# Patient Record
Sex: MALE | Employment: OTHER | URBAN - METROPOLITAN AREA
[De-identification: names, ages, dates, MRNs, and addresses within clinical notes are randomized per-mention and may not be internally consistent; named-entity substitution may affect disease eponyms.]

---

## 2021-03-23 ENCOUNTER — HOSPITAL ENCOUNTER (OUTPATIENT)
Dept: PHYSICAL THERAPY | Age: 66
Setting detail: THERAPIES SERIES
Discharge: HOME | End: 2021-03-23
Payer: MEDICARE

## 2021-03-23 ENCOUNTER — HOSPITAL ENCOUNTER (OUTPATIENT)
Dept: SPEECH THERAPY | Age: 66
Setting detail: THERAPIES SERIES
Discharge: HOME | End: 2021-03-23
Payer: MEDICARE

## 2021-03-23 DIAGNOSIS — I69.320 APHASIA AS LATE EFFECT OF CEREBROVASCULAR ACCIDENT (CVA): Primary | ICD-10-CM

## 2021-03-23 PROCEDURE — 97161 PT EVAL LOW COMPLEX 20 MIN: CPT

## 2021-03-23 PROCEDURE — 92523 SPEECH SOUND LANG COMPREHEN: CPT

## 2021-03-23 NOTE — PROGRESS NOTES
Dear Referring Practitioner: Dr. Michelle Ceja,     We had the pleasure of evaluating the following patient for physical therapy services at [facility]. A summary of our findings can be found in the initial assessment below. This includes our plan of care. If you have any questions or concerns regarding these findings, please do not hesitate to contact me at the office phone number. Thank you for the referral.         Physician Signature:_______________________________Date:__________________  By signing above (or electronic signature), therapists plan is approved by physician      Physical Therapy  Initial Assessment  Date: 3/23/2021  Patient Name: Salvador Bey  MRN: [id number]  : 1955    Subjective   General  Chart Reviewed: Yes  Patient assessed for rehabilitation services?: Yes  Additional Pertinent Hx: expressive aphasia, CVA, HTN  Referring Practitioner: Chiara Miller MD  Referral Date : 21  Diagnosis: CVA, R hemiparesis  General Comment  Comments: PLOF: , working at [employer] center, indep in ADLs  PT Visit Information  Onset Date: 19  PT Insurance Information: Medicare  Subjective  Subjective: Pt reports that he had a stroke ov 2019. Pt was then in the hospital and then transferred to [facility]. From [facility], he then went to [facility] in [place]. Was in rehab there for a while and then got stuck there because of COVID. Pt then returned to [facility] here in [place] in May. He has been receiving therapy there but that ended Dec 4th. Pt has been doing some exercises at home since home therapy left. Pt reports that he was a  and worked at [employer]. Pt would like to get back to walking better. Pt would also like to get stronger in his legs. Pt in indep in dressing and bathing. Pt does not have to do his own cleaning. Pt was driving but not since the stroke. Pt uses an AFO.   Pain Screening  Patient Currently in Pain: Denies  Vital Signs Individual Concurrent Group Co-treatment   Time In 0615         Time Out 1500         Minutes 45         Timed Code Treatment Minutes: [address] [place], [address] [facility] [address]

## 2021-03-23 NOTE — PROGRESS NOTES
[facility], [facility],  Miriam Chaney, [address]  Phone: [phone]  Fax [phone]         Speech Language Pathology  Facility/Department: [facility] SPEECH THERAPY  Initial Assessment    NAME: Jimmy Stuart  : 1955  MRN: [id number]    Date of Eval: 3/23/2021  Evaluating Therapist: Prashant Olivas    Visit Diagnoses       Codes    Aphasia as late effect of cerebrovascular accident (CVA)    -  Primary I69.320        Primary Complaint: Difficulty communicating due to his Aphasia. Onset Date: 19    Pain: Denies    Assessment/Impressions: The pt demonstrates moderate-severe expressive and receptive Aphasia. His verbal expression is non-fluent. The pt's spoken communication is limited to occasional words and phrase productions. Often the pt struggles to produce words becoming visibly frustrated as a result. Use of communication strategies such as fill-in the blank sentences and giving associated words was often successful at stimulating the attempted word production. His ability to repeat is largely intact with the pt able to repeat up to the single sentence level. His auditory processing is also significantly impaired. He could follow 1-step commands and answer basic yes/no question. However, he becomes quickly confused if there is more than 1 step or if the information becomes linguistically complex. Reading and writing reflect his Aphasia. He was only able to write his last name, unable to write any other single words. Reading is also a significant struggle. The pt has an iPad with and AAC panda loaded. However, per his sister-in-law he has not been using AAC much to communicate. The pt was assessed via the [address] ([place]-R) as well as informal measures. The pt received a bedside language score of 23.75 out of a possible 100. See a summary of results below.     Western Aphasia Battery Revised: Subtest: Score:   Spontaneous Speech: Content 0/10      Spontaneous Speech: Fluency 2/10      Auditory Verbal Comprehension: Y/N Questions 5/10   Sequential Commands 2/10      Repetition 7/10      Object Naming 2/10    Reading 0/10      Writing 1/10   Total= 19 out of a possible 80; Bedside language score= 23.75 out of a possible 100        Subjective:   Previous level of function and limitations: The pt was a former  of a [employer]. General  Chart Reviewed: Yes  Patient assessed for rehabilitation services?: Yes  Family / Caregiver Present: Yes  Visit Information  Onset Date: 11/23/19  SLP Insurance Information: Medicare A & B; AARP  Subjective  Subjective: The pt was accompanied by his sister-in-law Lyle Quiroz. The pt reportedly lives at [address] [facility]. He brought in his iPad that had several speech apps that the pt reportedly uses for hours each day. He is a former . Vital Signs  Patient Currently in Pain: Denies  Social/Functional History  Type of Home: Assisted living     Vision  Vision: Impaired  Vision Exceptions: Wears glasses for reading(reported right sided neglect)  Hearing  Hearing: Within functional limits      Objective:  Oral/Motor  Oral Motor: Within functional limits  Auditory Comprehension  Comprehension: Exceptions  Basic Questions: Mild  Complex Questions: Severe  One Step Basic Commands: Mild  Two Step Basic Commands: Severe  Multistep Basic Commands: Severe  Complex/Abstract Commands: Severe  Conversation: Moderate  Interfering Components: Processing speed  Effective Techniques: Extra processing time;Repetition; Slowed speech;Stressing words;Visual/Gestural cues  Reading Comprehension  Reading Status: Exceptions to [facility]  Words Impairment Severity: Severe  Phrases Impairment Severity: Severe  Sentence Impairment Severity: Severe  Expression  Primary Mode of Expression: Verbal  Verbal Expression  Verbal Expression: Exceptions to The pt's iPad speech/communication apps were also reviewed inculding [employer] panda. Patient Education Response: Needs reinforcement  D/C Recommendations: Outpatient  Requires SLP Intervention: Yes  Patient/family involved in developing goals and treatment plan: yes; the pt and his sister-in-law     Latex Allergy: [x]NO  []YES    Preferred Language for Healthcare: [x]English []Other:    [x]Patient history, allergies, meds reviewed. Medical chart reviewed. See intake form. Review of Symptoms (ROS):  [x]Performed Review of Symptoms (Integumentary, Cardiopulmonary, Neurological) by intake and observation. Intake form has been scanned into medical record. Patient has been instructed to contact their primary care physician regarding ROS issues if not already being addressed at this time.       C-SSRS Triggered by Intake questionnaire (Past 2 wk assessment):   [x] No, Questionnaire did not trigger screening.   [] Yes, Patient intake triggered further evaluation      [] C-SSRS Screening completed  [] PCP notified via Plan of Care  [] Emergency services notified      Luis Toribio Odessa Batty [id number]  Speech-Language Pathologist  Phone #: [phone]  Fax #: [phone]

## 2021-03-23 NOTE — PLAN OF CARE
[facility], [place] [address] [address]  [place], [address] [facility]  Phone: [phone]  Fax [phone]      Outpatient Speech Therapy  Phone: [phone] Fax: [phone]     To: Karen Juarez. Michelle Osborne MD      Patient: Myriam Cantrell  : 1955  MRN: [id number]  Evaluation Date: 3/23/2021      Diagnosis Information:  Visit Diagnoses        Codes     Aphasia as late effect of cerebrovascular accident (CVA)    -  Primary I69.320         Speech Therapy Certification Form    Plan of Care/Treatment to date:  [x] Speech-Language Evaluation/Treatment    [] Dysphagia Evaluation/Treatment        [] Dysphagia Treatment via Neuromuscular Electrical Stimulation (NMES)   [] Modified Barium Swallowing Study   [] Fiberoptic Endoscopic Evaluation of Swallowing (FEES)  [] Cognitive-Linguistic Skills Development  [] Voice evaluation and Treatment      [] Evaluation, modification, and Training of Voice Prosthetic     [] Evaluation for Speech-Generating Augmentative and Alternative Communication Device   [] Therapeutic Services for the use of Speech-Generating Device. [] Other:          Frequency/Duration:  # Days per week: [] 1 day # Weeks: [] 1 week [] 5 weeks      [x] 2 days   [] 2 weeks [x] 6 weeks     [] 3 days   [] 3 weeks [] 7 weeks     [] 4 days   [] 4 weeks [] 8 weeks    Rehab Potential: [] Excellent [x] Good [] Fair  [] Poor       Electronically signed by: CORWIN Davenport  Phone: [phone]  Fax: [phone]    If you have any questions or concerns, please don't hesitate to call.   Thank you for your referral.      Physician Signature:________________________________Date:__________________  By signing above, therapists plan is approved by physician

## 2021-03-23 NOTE — PROGRESS NOTES
NOMS - Spoken Language Expression      Patient: Bryan Marks  : 1955  MRN: [id number]  Date: 3/23/2021  Electronically Signed by: Gabriella Perez MA, CCC-SLP     Note: This FCM should not be used for individuals using an augmentative/alternative communication system. []  Level 1 The individual attempts to speak, but verbalizations are not meaningful to familiar or unfamiliar communication partners at any time    []  Level 2 The individual attempts to speak, although few attempts are accurate or appropriate. The communication partner must assume responsibility for structuring the communication exchange, and with consistent and maximal cueing, the individual can only occasionally produce automatic and/or imitative words and phrases that are rarely meaningful in context    [x]  Level 3  The communication partner must assume responsibility for structuring the communication exchange, and with consistent and moderate cueing, the individual can produce words and phrases that are appropriate and meaningful in context. []  Level 4 The individual is successfully able to initiate communication using spoken language in simple, structured conversations in routine daily activities with familiar communication partners. The individual usually requires moderate cueing, but is able to demonstrate use of simple sentences (i.e., semantics, syntax, and morphology) and rarely uses complex sentences/messages. []  Level 5 The individual is successfully able to initiate communication using spoken language in structured conversations with both familiar and unfamiliar communication partners. The individual occasionally requires minimal cueing to frame more complex sentences in messages.   The individual occasionally self-cues when encountering difficulty     []  Level 6 The individual is successfully able to communicate in most activities, but some limitations in spoken language are still apparent in vocational, avocational, and social activities . The individual rarely requires minimal cueing to frame complex sentences. The individual usually self-cues when encountering difficulty. []  Level 7 The individual's ability to independently participate in vocational, avocational, and social activities are not limited by spoken language skills . Independent functioning may occasionally include use of self-cueing.

## 2021-03-23 NOTE — FLOWSHEET NOTE
[facility]. and Therapy, [facility], [address]  Phone: [phone]  Fax [phone]    Physical Therapy Daily Treatment Note    Date:  3/23/2021    Patient Name:  Marli Bowers    :  1955  MRN: [id number]  Restrictions/Precautions:    Medical/Treatment Diagnosis Information:  · Diagnosis: CVA, R hemiparesis  Insurance/Certification information:  PT Insurance Information: Medicare  Physician Information:  Referring Practitioner: Erika Pa MD  Plan of care signed (Y/N):  N  Visit# / total visits:       G-Code (if applicable):          LEFS     Medicare Cap (if applicable):  85 = total amount used, updated 3/23/2021    Time in:   2:15    Timed Treatment: 0  Total Treatment Time:  45  ________________________________________________________________________________________    Pain Level:    /10  SUBJECTIVE:  See eval    OBJECTIVE:     Exercise/Equipment Resistance/Repetitions Other comments                                                                                                                                             Other Therapeutic Activities:      Manual Treatments:         Modalities:      Test/Measurements:         ASSESSMENT:    See eval     Treatment/Activity Tolerance:   []Patient tolerated treatment well [] Patient limited by fatique  []Patient limited by pain [] Patient limited by other medical complications  [] Other:     Goals:    Short term goals  Time Frame for Short term goals: 6 weeks  Short term goal 1: pt will be indep in HEP  Short term goal 2: pt will increase R DF strength by 1/3 muscle grade  Short term goal 3: pt will ambulate with increased glut activation RLE  Short term goal 4: pt will decrease TUG time 10 sec  Short term goal 5: pt will increase LEFS score by 10 points to hit MCID           Plan: [x] Continue per plan of care [] Alter current plan (see comments)   [x] Plan of care initiated [] Hold pending MD visit [] Discharge      Plan for Next Session:      Re-Certification Due Date:         Signature:  Aroldo Lindo PT

## 2021-03-25 ENCOUNTER — HOSPITAL ENCOUNTER (OUTPATIENT)
Dept: SPEECH THERAPY | Age: 66
Setting detail: THERAPIES SERIES
Discharge: HOME | End: 2021-03-25
Payer: MEDICARE

## 2021-03-25 ENCOUNTER — HOSPITAL ENCOUNTER (OUTPATIENT)
Dept: OCCUPATIONAL THERAPY | Age: 66
Setting detail: THERAPIES SERIES
Discharge: HOME | End: 2021-03-25
Payer: MEDICARE

## 2021-03-25 PROCEDURE — 97166 OT EVAL MOD COMPLEX 45 MIN: CPT

## 2021-03-25 PROCEDURE — 92507 TX SP LANG VOICE COMM INDIV: CPT

## 2021-03-25 PROCEDURE — 97140 MANUAL THERAPY 1/> REGIONS: CPT

## 2021-03-25 PROCEDURE — 97535 SELF CARE MNGMENT TRAINING: CPT

## 2021-03-25 NOTE — FLOWSHEET NOTE
[facility]. and Therapy, [facility], [address] [facility], [address] [place] [address]   Phone: [phone]  Fax [phone]      Outpatient Occupational Therapy     [x] Daily Treatment Note   [] Progress Note   [] Discharge Note    Date:  3/25/2021    Patient Name:  Stephan Beck         YOB: 1955    Medical Diagnosis/ICD10[de-identified]   CVA/I63.9, Right hemiparesis, Right side neglect, Expressive aphasia  Treatment Diagnosis: Impaired self care status due to decreased functional use of RUE/RLE  Referring Physician: Dr. Jillian Kuo    Referral Date:  3/15/2021  Onset Date:  Nov 2019  Visits Allowed/Insurance/Certification Information:   Medicare A/B, AARP  Restrictions/Precautions Right vic, aphasia    Plan of care sent to provider:    [x]Faxed (date: 3/25/2021  ) []Co-signature    (attempts: 1[] 2 []3[])        Plan of care signed:    []Yes (date:  )           [x]No       Progress Note covers period from (if applicable):    [x]NA    []From          To           Next Progress Note due:   4/22/2021    Visit# / total visits:  2/12    Functional Outcome Measure:   3/25/2021: QuickDASH: 42    Subjective: Pt arrived with sister-in-law, [facility]. Pt ambulating with [facility]. Pain level: denies    Plan for Next Session:  · Assess visual fields and tracking  · PROM in supine      Objective:       Exercises:    Exercises in bold performed in department today. Items not bolded are carried forward from prior visits for continuity of the record.   Exercise/Equipment Resistance/Repetitions HEP Other comments      ADL/IADL TRAINING         []        []        []        []       NEUROMUSCULAR RE-EDU and THERAPEUTIC ACTIVITY        Education on stroke recovery Role of OT  Tone and management of tone  Typical progression of UE function after stoke   [] Pt's sister-in-law verbalized understanding  Pt may benefit from reinforcement d/t communication deficits.        []        [] THERAPEUTIC EXERCISE and MANUAL TECHNIQUES        PROM RUE  Seated in chair []        []        []       MODALITIES         []        []       SPLINTING       RHS Pt reports pre-bettina resting hand splint at home as recommended by home OT. Pt reports difficult to don independently []      Home Exercise Program: TBD    Treatment/Activity Tolerance:    Patients response to treatment:   [x]Patient tolerated treatment well []Patient limited by fatigue   []Patient limited by pain  []Patient limited by other medical complications   []Other:     Assessment:   Pt is a 77year old male, referred to outpatient occupational therapy with diagnosis of CVA, right hemiparesis, right neglect and aphasia. Pt presents with impaired IADL status due to non-functional RUE. Pt will benefit from outpatient OT to maximize safety and independence with daily living tasks. Recommend outpatient OT 2x/week for 6weeks.         GOALS:  Patient goal: \" Get right arm working. Move back into home.  \"     Short Term Goals:  to be met in 3 weeks (by 4/15/2021)     Pt will demonstrate active assisted right shoulder flexion to 50 degrees or better, gravity reduced, while rating pain < 2/10 for improved ROM for functional task performance.     Pt will demonstrate 15o degrees of active assisted elbow flexion against gravity, while rating pain < 0/10 for improved ROM for functional task performance.     Pt and caregiver will independently verbalize 2 techniques to obtain optimal positioning of on right vic arm to ensure approximation of glenohumeral joint and decrease risk of complications from subluxation.      Pt will participate in cont assessment of visual perceptual status by completing visual tracking and visual field test. .     Pt will report consistent compliance with HEP at least 4x/wk        Long Term Goals:  to be met in 6 weeks (by 5/6/2021)     Pt will demonstrate active assisted right shoulder flexion to 50 degrees, against gravity, while rating pain < 2/10 for improved ROM for functional task performance.     Pt will demonstrate 25o degrees of right active elbow flexion,against gravity, while rating pain < 0/10 for improved ROM for functional task performance.     Pt will demonstrate 20o degrees of right active elbow extension while rating pain < 0/10 for improved ROM for functional task performance.     Pt will tolerate WBing for 5 min through RUE with elbow and wrist extension for tone management in prep for active assisted movement.          Prognosis: [x]Good   []Fair   []Poor    Patient Requires Follow-up:  [x]Yes  []No    Plan: [x]Plan of care initiated     [x]Continue per plan of care    [] Alter current plan (see comments)    []Hold pending MD visit []Discharge      ---  ---- --- ---- --- ---- --- ---- --- ---- --- ---- --- ---- --- ---- --- ---- --- --- ---    Therapeutic Exercise/Home Exercise Program:   0 minutes    Therapeutic Activity:  0 minutes    ADL Trainin minutes      Neuromuscular Re-Education:  0 minutes      Manual Therapy:  15 minutes    Splintin minutes     Modalities:  0 minutes    Time in:          1115    Time Out:   1210     Timed Code Treatment Minutes:   15 min eval + 40minute treatment =    55 minutes        Total Treatment Time:  40 minutes           Electronically signed by:  QI Mcneill/BETINA

## 2021-03-25 NOTE — FLOWSHEET NOTE
words with 50% accuracy, mod-max cues:  - Did not target    Assessment:   Noted significantly improved verbal expression output via use of NILSA today. Plan:   Continued Speech Therapy services 2 x week for 6 weeks. Total Treatment Time / Charges     Time in Time out Total Time / units   Cognitive Tx         Speech Tx 1007 1045 38 min / 1 unit   Dysphagia Tx           Signature:     Chaparro Acosta [place], [address] [facility]  [id number]  Speech-Language Pathologist  Phone #: [phone]  Fax #: [phone]

## 2021-03-25 NOTE — PROGRESS NOTES
[facility], [address]  Phone: [phone]  Fax [phone]    3/25/2021  To: Dr. Sean Woods    Patient: Bryan Marks  : 1955  MRN: [id number]  Evaluation Date: 3/25/2021      Diagnosis Information: CVA, Right hemiparesis, Right side neglect, Expressive aphasia    The following patient has been evaluated for occupational therapy services and for therapy to continue, insurance requires physician review of the treatment plan. Please review the attached evaluation and/or summary of the patient's plan of care, and verify that you agree therapy should continue by signing the attached document and sending it back to our office. Thank you for this referral.    PLAN OF CARE  To see patient for  2  x/week for 6  weeks for the following treatment interventions:     [x] Therapeutic Exercise  [x]  Modalities:  [x] Therapeutic Activity   [x] Electrical Stimulation  [x]  Neuromuscular Re-education  [x] Ultrasound    [x]  ADL/IADL training    [x] Kinesiotaping  [x]  Manual Therapy    [x] Coldpack/hotpack   [x]  Instruction in HEP    Other:  [x]  Splinting     []                []  LSVT     []              []  Wheelchair mobility training  []  Seating and positioning   []  Other: _______________    Thank you for the referral of this patient. Please sign the attached certification form.     Physician signature_______________________ Date________________    Fax to:   [phone]            OCCUPATIONAL THERAPY EVALUATION  Evaluation Date:  3/25/2021    Patient Name: Bryan Marks  YOB: 1955   Medical Diagnosis/ICD10[de-identified]   CVA/I63.9, Right hemiparesis, Right side neglect, Expressive aphasia  Treatment Diagnosis: Impaired self care status due to decreased functional use of RUE/RLE  Referring Physician: Dr. Sean Woods    Referral Date:  3/15/2021  Onset Date:  2019  Visits Allowed/Insurance/Certification Information:   Medicare A/B, AARP  Restrictions/Precautions Right vic, aphasia    SUBJECTIVE FINDINGS    Pain:  denies    PMHx:  HTN    History of Present Illness:    Pt reports that he had a stroke ov Nov 23, 2019. Pt was then in the hospital and then transferred to [facility]. From [facility], he then went to [facility] in [place]. Was in rehab there for a while and then got stuck there because of COVID. Pt then returned to [facility] here in [facility] in May. He has been receiving therapy there but that ended Dec 4th. Pt has been doing some exercises at home since home therapy left. Pt reports that he was a  and worked at [employer] Pt would like to get back to walking better. Pt would also like to get stronger in his legs. Pt in indep in dressing and bathing. Pt does not have to do his own cleaning. Pt was driving but not since the stroke. Home Environment - Lives with alone in assisted living apartment and [facility]  Family/caregiver support:    YES     Home environment:   apartment   Steps to enter first floor:    No steps   Steps to second floor: N/A  Bathroom:    Walk-in Shower, Grab bars and built-in shower chair  Bedroom:     Sleeps in regular bed  Equipment owned:   small base quad cane (SBQC), manual W/C and life alert      (Home on [place]: [address], 2 levels plus basement. Laundry in basement. Main floor bed and bath. Tub/shower.)     Current Functional Limitations:    BADLs  Bathing:   Independent   Dressing:   Independent   Toileting:   Independent    IADLs   Home tasks: Brother manages finances. Medication management, Cleaning, Laundry and cleaning services provided by [facility]. Work:  Working full time until [place] tasks:  Minimal since CVA and pandemic    Driving:    No driving since CVA    Prior Level of Functioning:      Pt fully independent with BADLs and IADLs and very active with work and leisure prior to November 2019.     Functional Outcome Measure:   3/25/2021: QuickDASH: 42    OBJECTIVE FINDINGS    Range of Motion    AROM         RIGHT   LEFT     Date 3/25/2021   3/25/2021    Shoulder:   flex x   WNL                       ABD ~75o   WNL    Elbow:      ext/flex x   WNL    Forearm:  sup/pron x   WNL    Wrist:       ext/flex x   WNL    Digits: x   WNL      Comments:   PROM of RUE WFL with increased time d/t flexor tone and shortened muscles/tendons. Demo active shrug and scap squeeze on right. Active right shld abduction with compensation at upper trap. No active movement noted in elbow/wrist/digits. No sublux noted in right GH joint. Pt's sister-in-law reports possible h/o GH sublux. Strength  Muscle  (*denotes pain) Right   Left  Comments       DATE 3/25/2021   3/25/2021     Shoulder Flexion  1   WNL     Shoulder Abduction  2+   WNL     Elbow Flexion 1?    WNL     Elbow Extension 0   WNL     Pronation 0   WNL     Supination 0   WNL     Wrist Flexion 0   WNL     Wrist Extension 0   WNL         Assessment of UE tone/spasticity:    Date: 3/35/2021      Shoulder flexors 2      Internal rotators 1      External rotators 0      Elbow flexors 1+  (clonus)      Elbow extensor 1+      Supinators 0      Pronators 1+      Wrist flexors 2      Wrist extensors 0      Digit flexors 2-3      Digit extensors 0          Modified Rian Scale  0    No increase in muscle tone  1    Slight increase in muscle tone, manifested by a catch and release or by minimal        resistance at the end of the range of motion when the affected part(s) is moved in        flexion or extension  1+ Slight increase in muscle tone, manifested by a catch, followed by minimal        resistance throughout the remainder (less than half) of the ROM  2    More marked increase in muscle tone through most of the ROM, but        affected part(s) easily moved  3    Considerable increase in muscle tone, passive movement difficult  4    Affected part(s) rigid in flexion or extension      Sensation:  Pt denies N/T in UE. Light touch intact. Proprioception:  Will cont to assess    Visual Assessment:   Wears glasses: for reading. Visual Tracking: requires further assessment   Visual fields: requires further assessment    Reports history of:  cataracts ; has seen neuro-opthomalogist (Dwain) and reports s/p procedure. Functional Cognition:   WFL for tasks completed. Follows one step commands appropriately. Difficult to fully assess d/t communication deficits. Demo good safety awareness. Hearing:  WFL    ASSESSMENT  Pt is a 77year old male, referred to outpatient occupational therapy with diagnosis of CVA, right hemiparesis, right neglect and aphasia. Pt presents with impaired IADL status due to non-functional RUE. Pt will benefit from outpatient OT to maximize safety and independence with daily living tasks. Recommend outpatient OT 2x/week for 6weeks. Problems:  [x] Impaired ADL/IADL status   [x] Decreased strength  [] Impaired functional transfer status  [x] Decreased endurance  [x] FMC deficits    [x] Impaired balance     [x] GMC deficits    [] Impaired functional cognition  [x] Visual impairments                    Decreased ROM   [] Impaired proprioception   [] Inadequate seating system   [] Decreased sensation   [] Other: ________________     Rehabilitation Potential:   [x]Good  []Fair    []Poor    Strengths for achieving goals include:   [x]Pt motivated   [x]PLOF   [x]Family support   Limitations for achieving goals include: []Pain  [x]Severity of condition   []Cognitive   []Lack of family support   []Lack of resources   []Other:           GOALS:  Patient goal: \" Get right arm working. Move back into home. \"    Short Term Goals:  to be met in 3 weeks (by 4/15/2021)    Pt will demonstrate active assisted right shoulder flexion to 50 degrees or better, gravity reduced, while rating pain < 2/10 for improved ROM for functional task performance.     Pt will demonstrate 15o degrees of active assisted elbow flexion against gravity, while rating pain < 0/10 for improved ROM for functional task performance. Pt and caregiver will independently verbalize 2 techniques to obtain optimal positioning of on right vic arm to ensure approximation of glenohumeral joint and decrease risk of complications from subluxation. Pt will participate in cont assessment of visual perceptual status by completing visual tracking and visual field test. .    Pt will report consistent compliance with HEP at least 4x/wk      Long Term Goals:  to be met in 6 weeks (by 5/6/2021)    Pt will demonstrate active assisted right shoulder flexion to 50 degrees, against gravity, while rating pain < 2/10 for improved ROM for functional task performance. Pt will demonstrate 25o degrees of right active elbow flexion,against gravity, while rating pain < 0/10 for improved ROM for functional task performance. Pt will demonstrate 20o degrees of right active elbow extension while rating pain < 0/10 for improved ROM for functional task performance. Pt will tolerate WBing for 5 min through RUE with elbow and wrist extension for tone management in prep for active assisted movement. Thank you for the referral of this patient. Please sign the attached certification form.     Time in: 1115 Time Out:   1210    Timed Code Treatment Minutes:   15 min eval + 40minute treatment =  55 minutes Total Treatment Time:  40 minutes      Electronically signed by: QI Walls/BETINA

## 2021-03-26 ENCOUNTER — HOSPITAL ENCOUNTER (OUTPATIENT)
Dept: PHYSICAL THERAPY | Age: 66
Setting detail: THERAPIES SERIES
Discharge: HOME | End: 2021-03-26
Payer: MEDICARE

## 2021-03-26 PROCEDURE — 97112 NEUROMUSCULAR REEDUCATION: CPT

## 2021-03-26 PROCEDURE — 97110 THERAPEUTIC EXERCISES: CPT

## 2021-03-26 NOTE — FLOWSHEET NOTE
[facility], [facility], [address] [facility], [address] [facility]  Phone: [phone]  Fax [phone]    Physical Therapy Daily Treatment Note    Date:  3/26/2021    Patient Name:  Gregory Lux    :  1955  MRN: [id number]  Restrictions/Precautions:    Medical/Treatment Diagnosis Information:  · Diagnosis: CVA, R hemiparesis  Insurance/Certification information:  PT Insurance Information: Medicare  Physician Information:  Referring Practitioner: Ashli Bui MD  Plan of care signed (Y/N):  N  Visit# / total visits:       G-Code (if applicable):          LEFS     Medicare Cap (if applicable):  506 = total amount used, updated 3/26/2021    Time in:   10:15    Timed Treatment: 45  Total Treatment Time:  45  ________________________________________________________________________________________    Pain Level:    /10  SUBJECTIVE:  Nothing new to report. No pain noted. OBJECTIVE: [id number]    Exercise/Equipment Resistance/Repetitions Other comments   NuStep 5 min           Supine leg press  Supine hip flexion 3 min  3 min Manual resistance   Clams #1 2x10 BLE    Bridge    Single limb x10  x10    PHE    Bent knee x10  x10 RLE    PKF 2x5 RLE    Supine clams x15 yellow   Supine hip neuro re-ed 3 min RLE    LAQ x10 RLE With TB assist   Seated hamstring curls x10 With maxislide   Sit-to-stand training nv                                TG 5 min                    Other Therapeutic Activities:      Manual Treatments:         Modalities:      Test/Measurements:         ASSESSMENT:    Pt tolerated session well. Early fatigue noted throughout. Minimal glut and hamstring activation noted - tactile cueing assisted in muscle contraction. Pt responded better with visual demonstration of exercise.     Treatment/Activity Tolerance:   [x]Patient tolerated treatment well [] Patient limited by fatique  []Patient limited by pain [] Patient limited by other medical complications  [] Other:     Goals:    Short term goals  Time Frame for Short term goals: 6 weeks  Short term goal 1: pt will be indep in HEP  Short term goal 2: pt will increase R DF strength by 1/3 muscle grade  Short term goal 3: pt will ambulate with increased glut activation RLE  Short term goal 4: pt will decrease TUG time 10 sec  Short term goal 5: pt will increase LEFS score by 10 points to hit MCID           Plan: [x] Continue per plan of care [] Alter current plan (see comments)   [] Plan of care initiated [] Hold pending MD visit [] Discharge      Plan for Next Session:      Re-Certification Due Date:         Signature:  Deanna Costa PT

## 2021-03-30 ENCOUNTER — HOSPITAL ENCOUNTER (OUTPATIENT)
Dept: SPEECH THERAPY | Age: 66
Setting detail: THERAPIES SERIES
Discharge: HOME | End: 2021-03-30
Payer: MEDICARE

## 2021-03-30 ENCOUNTER — HOSPITAL ENCOUNTER (OUTPATIENT)
Dept: PHYSICAL THERAPY | Age: 66
Setting detail: THERAPIES SERIES
Discharge: HOME | End: 2021-03-30
Payer: MEDICARE

## 2021-03-30 PROCEDURE — 92507 TX SP LANG VOICE COMM INDIV: CPT

## 2021-03-30 PROCEDURE — 97110 THERAPEUTIC EXERCISES: CPT

## 2021-03-30 PROCEDURE — 97112 NEUROMUSCULAR REEDUCATION: CPT

## 2021-03-30 NOTE — FLOWSHEET NOTE
[facility], [place] [address] [address]  [address] [facility], [address] [place] [address]   Phone: [phone]  Fax [phone]      Speech-Language Pathology  Daily Treatment Note    Date:  3/30/2021    Patient Name:  Ny Ralhp    :  1955  MRN: [id number]  Restrictions/Precautions:  Limited communication ability due to significant Aphasia  Treatment Diagnosis:    Codes     Aphasia as late effect of cerebrovascular accident (CVA)    -  Primary   Insurance/Certification information: Medicare A & B; [address]   Referring Physician:  Alissa Richard. Kyle Monique MD  Plan of care signed (Y/N):  Y  Visit# / total visits:  3/13   Pain level: No reported or suspected pain noted     Progress Note: []  Yes  [x]  No  Next due by: Visit #10: 3/10 or 21     Subjective: The pt demonstrated some improvement in verbal expression this date say more word and phrase productions with some reduction in the amount of hesitations. He still quickly become frustrated when attempting to verbally express himself but this frustration appeared to be less today. Objective:   Short-term Goals:  Goal 1: The pt will say indivdual phrase and short sentences, in response to therapy tasks, with 70% accuracy, mod-max cues:  - 70% (7/10), mod-max cues and use of modified NILSA  WILL D/C GOAL AS MET IF ABLE TO  MAINTAIN ACCURACY    Goal 2: The pt will complete basic confrontational and responsive naming with 75% accuracy, mod-max cues:  - Basic Confrontational: 60% (6/10), mod-max cues  - Basic Responsive: 70% (7/10), mod-max cues     Goal 3: The pt will use an AAC speech generating device to answer given basic questions with 80% accuracy, mod-max cues:  - 33%, mod-max cues and extra time; requiring the pt to find and point to a icon to answer a given question (e.g, \"What would you like to have for dinner? \")    Goal 4: The pt will write single basic words with 50% accuracy, mod-max cues:  - 0% (0/3), mod-max cues  - Spelling Magic Catrachito requiring him to arrange 3 letters to form basic words: 80% (4/5) with cues and extra time. Assessment:   Noted improved verbal expression today overall. Writing/spelling is a significant struggle. Plan:   Continued Speech Therapy services 2 x week for 6 weeks. Total Treatment Time / Charges     Time in Time out Total Time / units   Cognitive Tx         Speech Tx 1315 1415 60 min / 1 unit   Dysphagia Tx           Signature:     Sher Chou MA, [facility]  [id number]  Speech-Language Pathologist  Phone #: [phone]  Fax #: [phone]

## 2021-04-01 ENCOUNTER — HOSPITAL ENCOUNTER (OUTPATIENT)
Dept: PHYSICAL THERAPY | Age: 66
Setting detail: THERAPIES SERIES
Discharge: HOME | End: 2021-04-01
Payer: MEDICARE

## 2021-04-01 ENCOUNTER — HOSPITAL ENCOUNTER (OUTPATIENT)
Dept: SPEECH THERAPY | Age: 66
Setting detail: THERAPIES SERIES
Discharge: HOME | End: 2021-04-01
Payer: MEDICARE

## 2021-04-01 ENCOUNTER — HOSPITAL ENCOUNTER (OUTPATIENT)
Dept: OCCUPATIONAL THERAPY | Age: 66
Setting detail: THERAPIES SERIES
Discharge: HOME | End: 2021-04-01
Payer: MEDICARE

## 2021-04-01 PROCEDURE — 97110 THERAPEUTIC EXERCISES: CPT

## 2021-04-01 PROCEDURE — 97112 NEUROMUSCULAR REEDUCATION: CPT

## 2021-04-01 PROCEDURE — 92507 TX SP LANG VOICE COMM INDIV: CPT

## 2021-04-01 PROCEDURE — 97140 MANUAL THERAPY 1/> REGIONS: CPT

## 2021-04-01 NOTE — FLOWSHEET NOTE
[facility], [facility],  [address], [address] [place]   Phone: [phone]  Fax [phone]      Speech-Language Pathology  Daily Treatment Note    Date:  2021    Patient Name:  Martell Ackerman    :  1955  MRN: [id number]  Restrictions/Precautions:  Limited communication ability due to significant Aphasia  Treatment Diagnosis:    Codes     Aphasia as late effect of cerebrovascular accident (CVA)    -  Primary   Insurance/Certification information: Medicare A & B; [address]   Referring Physician:  Frank Matthews. Kb Verdugo MD  Plan of care signed (Y/N):  Y  Visit# / total visits:     Pain level: No reported or suspected pain noted     Progress Note: []  Yes  [x]  No  Next due by: Visit #10: 4/10 or 21     Subjective: The pt was accompanied by his sister-in-law. The pt was saying more words and phrases today in natural contexts. He was shown Sojeans and this was uploaded to his iPad as well as an catrachito for people with Aphasia to link music to verbal output. The pt still becomes quickly frustrated when difficulty speaking is encountered. Objective:   Short-term Goals:  Goal 1: The pt will say indivdual phrase and short sentences, in response to therapy tasks, with 70% accuracy, mod-max cues:  - 60% (6/10), mod-max cues and use of modified NILSA  WILL D/C GOAL AS MET IF ABLE TO  MAINTAIN ACCURACY    Goal 2: The pt will complete basic confrontational and responsive naming with 75% accuracy, mod-max cues:  - Goal targeted indirectly through other treatment tasks. Goal 3: The pt will use an AAC speech generating device to answer given basic questions with 80% accuracy, mod-max cues:  - - Goal targeted indirectly through other treatment tasks.       Goal 4: The pt will write single basic words with 50% accuracy, mod-max cues:  - Spelling Magic Catrachito requiring him to arrange 3 letters to form basic words: 80% (4/5) with cues and extra time. Assessment:   Noted improved verbal expression this date in natural communication. As a result, the session mostly focused on use of NILSA to promote spoken speech output. Plan:   Continued Speech Therapy services 2 x week for 6 weeks. Total Treatment Time / Charges     Time in Time out Total Time / units   Cognitive Tx         Speech Tx 1509 8089 60 min / 1 unit   Dysphagia Tx           Signature:     Starr Villalobos MA, [address] [id number]  Speech-Language Pathologist  Phone #: [phone]  Fax #: [phone]

## 2021-04-01 NOTE — FLOWSHEET NOTE
[facility], [facility], [address]  Phone: [phone]  Fax [phone]    Physical Therapy Daily Treatment Note    Date:  2021    Patient Name:  Emili Wheeler    :  1955  MRN: [id number]  Restrictions/Precautions:    Medical/Treatment Diagnosis Information:  · Diagnosis: CVA, R hemiparesis  Insurance/Certification information:  PT Insurance Information: Medicare  Physician Information:  Referring Practitioner: Kahlil Yo MD  Plan of care signed (Y/N):  N  Visit# / total visits:       G-Code (if applicable):          LEFS     Medicare Cap (if applicable):  086 = total amount used, updated 2021    Time in:   2:15    Timed Treatment: 45  Total Treatment Time:  45  ________________________________________________________________________________________    Pain Level:    /10  SUBJECTIVE:  Pt reports that he was worn out after the last session - slept a lot that night. OBJECTIVE: [id number]    Exercise/Equipment Resistance/Repetitions Other comments   NuStep 5 min           Supine leg press  Supine hip flexion 3 min  3 min Manual resistance   Clams #1 2x10 BLE    Bridge    Single limb x10  x10    PHE    Bent knee x10  x10 RLE In side-lying this session   PKF 2x5 RLE In sidelying this session   Supine clams x15 yellow   Supine hip neuro re-ed 3 min RLE    LAQ x10 RLE With TB assist   Seated hamstring curls x10 With maxislide   Sit-to-stand training nv                                TG 5 min                    Other Therapeutic Activities:      Manual Treatments:         Modalities:      Test/Measurements:         ASSESSMENT:    Pt tolerated session well. Early fatigue noted throughout. Minimal glut and hamstring activation noted - tactile cueing assisted in muscle contraction.  Pt with increased understanding of exercise, allowed for better focus on individual tasking of each exercise.     Treatment/Activity Tolerance:   [x]Patient tolerated treatment well [] Patient limited by fatique  []Patient limited by pain [] Patient limited by other medical complications  [] Other:     Goals:    Short term goals  Time Frame for Short term goals: 6 weeks  Short term goal 1: pt will be indep in HEP  Short term goal 2: pt will increase R DF strength by 1/3 muscle grade  Short term goal 3: pt will ambulate with increased glut activation RLE  Short term goal 4: pt will decrease TUG time 10 sec  Short term goal 5: pt will increase LEFS score by 10 points to hit MCID           Plan: [x] Continue per plan of care [] Alter current plan (see comments)   [] Plan of care initiated [] Hold pending MD visit [] Discharge      Plan for Next Session:      Re-Certification Due Date:         Signature:  Antoino Lockett , PT

## 2021-04-01 NOTE — FLOWSHEET NOTE
[facility], [facility], [address] [place] [address]   Phone: [phone]  Fax [phone]      Outpatient Occupational Therapy     [x] Daily Treatment Note   [] Progress Note   [] Discharge Note    Date:  4/1/2021    Patient Name:  Emili Wheeler         YOB: 1955    Medical Diagnosis/ICD10[de-identified]   CVA/I63.9, Right hemiparesis, Right side neglect, Expressive aphasia  Treatment Diagnosis: Impaired self care status due to decreased functional use of RUE/RLE  Referring Physician: Dr. Arturo Rodriguez    Referral Date:  3/15/2021  Onset Date:  Nov 2019  Visits Allowed/Insurance/Certification Information:   Medicare A/B, AARP  Restrictions/Precautions Right vic, aphasia    Plan of care sent to provider:    [x]Faxed (date: 3/25/2021  ) []Co-signature    (attempts: 1[] 2 []3[])        Plan of care signed:    []Yes (date:  )           [x]No       Progress Note covers period from (if applicable):    [x]NA    []From          To           Next Progress Note due:   4/22/2021    Visit# / total visits:  2/12    Functional Outcome Measure:   3/25/2021: QuickDASH: 42    Subjective: Pt arrived with sister-in-law, Sherry Canchola. Pt ambulating with SBQC. Pain level: denies    Plan for Next Session:  · Assess visual fields and tracking  · PROM in supine      Objective:       Exercises:    Exercises in bold performed in department today. Items not bolded are carried forward from prior visits for continuity of the record.   Exercise/Equipment Resistance/Repetitions HEP Other comments      ADL/IADL TRAINING         []        []        []        []       NEUROMUSCULAR RE-EDU and THERAPEUTIC ACTIVITY        Education on stroke recovery Role of OT  Tone and management of tone  Typical progression of UE function after stoke   [] Pt's sister-in-law verbalized understanding  Pt may benefit from reinforcement d/t communication deficits.        []        [] THERAPEUTIC EXERCISE and MANUAL TECHNIQUES        PROM RUE  - Supine    Shld flexion to ~90o  Shld abduction to ~90o  ER to ~10-15o  Elbow ext  Wrist ext to neutral with increased effort  Digit ext with increased effort   [] Pt's sister in law trained on PROM techniques for shld flex, shld abd and elbow ext. She returned demo and verbalized understanding. Emailed program via [employer].        []        []       MODALITIES         []        []       SPLINTING       RHS Pt reports pre-bettina resting hand splint at home as recommended by home OT. Pt reports difficult to don independently []      Home Exercise Program: PROM by caregiver    Treatment/Activity Tolerance:    Patients response to treatment:   [x]Patient tolerated treatment well []Patient limited by fatigue   []Patient limited by pain  []Patient limited by other medical complications   []Other:     Assessment:   Cont to assess RUE PROM in supine. Pt's sister in law trained on PROM techniques for shld flex, shld abd and elbow ext. She returned demo and verbalized understanding. Emailed program via [employer]. Cont per OT poc.     GOALS:  Patient goal: \" Get right arm working. Move back into home.  \"     Short Term Goals:  to be met in 3 weeks (by 4/15/2021)     Pt will demonstrate active assisted right shoulder flexion to 50 degrees or better, gravity reduced, while rating pain < 2/10 for improved ROM for functional task performance.     Pt will demonstrate 15o degrees of active assisted elbow flexion against gravity, while rating pain < 0/10 for improved ROM for functional task performance.     Pt and caregiver will independently verbalize 2 techniques to obtain optimal positioning of on right vic arm to ensure approximation of glenohumeral joint and decrease risk of complications from subluxation.      Pt will participate in cont assessment of visual perceptual status by completing visual tracking and visual field test. .     Pt will report consistent compliance with HEP at least 4x/wk        Long Term Goals:  to be met in 6 weeks (by 2021)     Pt will demonstrate active assisted right shoulder flexion to 50 degrees, against gravity, while rating pain < 2/10 for improved ROM for functional task performance.     Pt will demonstrate 25o degrees of right active elbow flexion,against gravity, while rating pain < 0/10 for improved ROM for functional task performance.     Pt will demonstrate 20o degrees of right active elbow extension while rating pain < 0/10 for improved ROM for functional task performance.     Pt will tolerate WBing for 5 min through RUE with elbow and wrist extension for tone management in prep for active assisted movement.          Prognosis: [x]Good   []Fair   []Poor    Patient Requires Follow-up:  [x]Yes  []No    Plan: [x]Plan of care initiated     [x]Continue per plan of care    [] Alter current plan (see comments)    []Hold pending MD visit []Discharge      ---  ---- --- ---- --- ---- --- ---- --- ---- --- ---- --- ---- --- ---- --- ---- --- --- ---    Therapeutic Exercise/Home Exercise Program:   15 minutes    Therapeutic Activity:  0 minutes    ADL Training:  minutes      Neuromuscular Re-Education:  15 minutes      Manual Therapy:  15 minutes    Splintin minutes     Modalities:  0 minutes    Time in:          1330    Time Out:   1415     Timed Code Treatment Minutes:   45 minutes        Total Treatment Time:  45 minutes           Electronically signed by:  QI Barragan/BETINA

## 2021-04-06 ENCOUNTER — HOSPITAL ENCOUNTER (OUTPATIENT)
Dept: PHYSICAL THERAPY | Age: 66
Setting detail: THERAPIES SERIES
Discharge: HOME | End: 2021-04-06
Payer: MEDICARE

## 2021-04-06 ENCOUNTER — HOSPITAL ENCOUNTER (OUTPATIENT)
Dept: SPEECH THERAPY | Age: 66
Setting detail: THERAPIES SERIES
Discharge: HOME | End: 2021-04-06
Payer: MEDICARE

## 2021-04-06 ENCOUNTER — HOSPITAL ENCOUNTER (OUTPATIENT)
Dept: OCCUPATIONAL THERAPY | Age: 66
Setting detail: THERAPIES SERIES
Discharge: HOME | End: 2021-04-06
Payer: MEDICARE

## 2021-04-06 PROCEDURE — 97112 NEUROMUSCULAR REEDUCATION: CPT

## 2021-04-06 PROCEDURE — 92507 TX SP LANG VOICE COMM INDIV: CPT

## 2021-04-06 PROCEDURE — 97535 SELF CARE MNGMENT TRAINING: CPT

## 2021-04-06 PROCEDURE — 97110 THERAPEUTIC EXERCISES: CPT

## 2021-04-06 NOTE — FLOWSHEET NOTE
[facility], [facility],  [address], [address]   Phone: [phone]  Fax [phone]      Speech-Language Pathology  Daily Treatment Note    Date:  2021    Patient Name:  Car Patel    :  1955  MRN: [id number]  Restrictions/Precautions:  Limited communication ability due to significant Aphasia  Treatment Diagnosis:    Codes     Aphasia as late effect of cerebrovascular accident (CVA)    -  Primary   Insurance/Certification information: Medicare A & B; [address]   Referring Physician:  Angela Salomon. Oumar Garcia MD  Plan of care signed (Y/N):  Y  Visit# / total visits:     Pain level: No reported or suspected pain noted     Progress Note: []  Yes  [x]  No  Next due by: Visit #10: 5/10 or 21     Subjective: The pt came to the session alone. He still demonstrates significant frustration when speech difficulty is encountered. Despite this frustration the pt demonstrates significant improvements in therapy    Objective:   Short-term Goals:  Goal 1: The pt will say indivdual phrase and short sentences, in response to therapy tasks, with 70% accuracy, mod-max cues:  - 60% (6/10), mod-max cues and use of modified NILSA; targeted via picture descriptions    Goal 2: The pt will complete basic confrontational and responsive naming with 75% accuracy, mod-max cues:  - Confrontational: 100% (12/12)  - Responsive: 100% (12/)    WILL D/C GOAL AS MET IF ABLE TO MAINTAIN ACCURACY    Goal 3: The pt will use an AAC speech generating device to answer given basic questions with 80% accuracy, mod-max cues:  - 0% (0/4), more difficultly finding icons this date.     Goal 4: The pt will write single basic words with 50% accuracy, mod-max cues:  - Spelling Magic Catrachito requiring him spell 3-4 letter words by using a movable alphabet in which he is able to touch all letters an hear the sound the letters make and what letter is needed in the word: 0% (0/5) with cues and extra time. The pt struggled with this task, not even touching the letters; He appeared overwhelmed by the task    Assessment:   The pt's verbal expression in natural settings is improving with more word and phrase productions. Plan:   Continued Speech Therapy services 2 x week for 6 weeks. Total Treatment Time / Charges     Time in Time out Total Time / units   Cognitive Tx         Speech Tx 1030 1115 45 min / 1 unit   Dysphagia Tx           Signature:     Pratik Dobbs MA, Pauline Fritz  GQ#[id number]  Speech-Language Pathologist  Phone #: [phone]  Fax #: [phone]

## 2021-04-06 NOTE — FLOWSHEET NOTE
[facility], [facility], [address] [place] [address], [address] [place] [address]   Phone: [phone]  Fax [phone]      Outpatient Occupational Therapy     [x] Daily Treatment Note   [] Progress Note   [] Discharge Note    Date:  4/6/2021    Patient Name:  Silvia Denny         YOB: 1955    Medical Diagnosis/ICD10[de-identified]   CVA/I63.9, Right hemiparesis, Right side neglect, Expressive aphasia  Treatment Diagnosis: Impaired self care status due to decreased functional use of RUE/RLE  Referring Physician: Dr. Arce Cea    Referral Date:  3/15/2021  Onset Date:  Nov 2019  Visits Allowed/Insurance/Certification Information:   Medicare A/B, AARP  Restrictions/Precautions Right vic, aphasia    Plan of care sent to provider:    [x]Faxed (date: 4/6/2021  ) []Co-signature    (attempts: 1[] 2 []3[])        Plan of care signed:    []Yes (date:  )           [x]No       Progress Note covers period from (if applicable):    [x]NA    []From          To           Next Progress Note due:   4/22/2021    Visit# / total visits:  3/12    Functional Outcome Measure:   3/25/2021: QuickDASH: 42    Subjective: Pt arrived alone. Pt ambulating with SBQC. Pain level: denies    Plan for Next Session:  WBing  Arm skate/table top towel slides  PROM in supine    Self ROM    Objective:       Exercises:    Exercises in bold performed in department today. Items not bolded are carried forward from prior visits for continuity of the record.   Exercise/Equipment Resistance/Repetitions HEP Other comments      ADL/IADL TRAINING       LB dressing Doff/don right shoe and AFO with modified independence as pt required increased time   []        []        []        []       NEUROMUSCULAR RE-EDU and THERAPEUTIC ACTIVITY        Education on stroke recovery Role of OT  Tone and management of tone  Typical progression of UE function after stoke   [] Pt's sister-in-law verbalized understanding   Pt will benefit from reinforcement d/t communication deficits. WBing RUE, fully ext  EOM  7 min [] Denies pain with WBing       []         THERAPEUTIC EXERCISE and MANUAL TECHNIQUES     AAROM  Attempted active assisted shld flexion    Pt demo shld abduction, elbow/wrist flex with attempts at active movement. PROM RUE  - Supine    Shld flexion to ~90o  Shld abduction to ~90o  ER to ~10-15o  Elbow ext  Wrist ext to neutral with increased effort  Digit ext with increased effort   [] Pt's sister in law trained on PROM techniques for shld flex, shld abd and elbow ext. She returned demo and verbalized understanding. Emailed program via [address] [place] [address]. Self ROM shld flexion while supine  Instructed to avoid flex >90  Pt returned demo [] Added to HEP       []       MODALITIES         []        []       SPLINTING       RHS Pt reports pre-bettina resting hand splint at home as recommended by home OT. Pt reports difficult to don independently []      Home Exercise Program: PROM by caregiver    Treatment/Activity Tolerance:    Patients response to treatment:   [x]Patient tolerated treatment well []Patient limited by fatigue   []Patient limited by pain  []Patient limited by other medical complications   []Other:     Assessment:   Pt instructed on self ROM for supine shld flex and returned demo. Tolerating WBing on RUE through wrist.  Cont per OT poc.     GOALS:  Patient goal: \" Get right arm working. Move back into home.  \"     Short Term Goals:  to be met in 3 weeks (by 4/15/2021)     Pt will demonstrate active assisted right shoulder flexion to 50 degrees or better, gravity reduced, while rating pain < 2/10 for improved ROM for functional task performance.     Pt will demonstrate 15o degrees of active assisted elbow flexion against gravity, while rating pain < 0/10 for improved ROM for functional task performance.     Pt and caregiver will independently verbalize 2 techniques to obtain optimal positioning of on right vic arm to ensure approximation of glenohumeral joint and decrease risk of complications from subluxation.      Pt will participate in cont assessment of visual perceptual status by completing visual tracking and visual field test. . - GOAL MET, 21, no apparent visual deficits     Pt will report consistent compliance with HEP at least 4x/wk        Long Term Goals:  to be met in 6 weeks (by 2021)     Pt will demonstrate active assisted right shoulder flexion to 50 degrees, against gravity, while rating pain < 2/10 for improved ROM for functional task performance.     Pt will demonstrate 25o degrees of right active elbow flexion,against gravity, while rating pain < 0/10 for improved ROM for functional task performance.     Pt will demonstrate 20o degrees of right active elbow extension while rating pain < 0/10 for improved ROM for functional task performance.     Pt will tolerate WBing for 5 min through RUE with elbow and wrist extension for tone management in prep for active assisted movement.          Prognosis: [x]Good   []Fair   []Poor    Patient Requires Follow-up:  [x]Yes  []No    Plan: []Plan of care initiated     [x]Continue per plan of care    [] Alter current plan (see comments)    []Hold pending MD visit []Discharge      ---  ---- --- ---- --- ---- --- ---- --- ---- --- ---- --- ---- --- ---- --- ---- --- --- ---    Therapeutic Exercise/Home Exercise Program:    minutes    Therapeutic Activity:  0 minutes    ADL Training: 15 minutes      Neuromuscular Re-Education:  30 minutes      Manual Therapy:   minutes    Splintin minutes     Modalities:  0 minutes    Time in:          1115    Time Out:   1200     Timed Code Treatment Minutes:   45 minutes        Total Treatment Time:  45 minutes           Electronically signed by:  QI Martin/BETINA

## 2021-04-06 NOTE — FLOWSHEET NOTE
[facility], [facility], [address] [place] [address]  Phone: [phone]  Fax [phone]    Physical Therapy Daily Treatment Note    Date:  2021    Patient Name:  Marshall Jones    :  1955  MRN: [id number]  Restrictions/Precautions:    Medical/Treatment Diagnosis Information:  · Diagnosis: CVA, R hemiparesis  Insurance/Certification information:  PT Insurance Information: Medicare  Physician Information:  Referring Practitioner: Ashu Walker MD  Plan of care signed (Y/N):  N  Visit# / total visits:       G-Code (if applicable):          LEFS     Medicare Cap (if applicable):  586 = total amount used, updated 2021    Time in:   9:45    Timed Treatment: 45  Total Treatment Time:  45  ________________________________________________________________________________________    Pain Level:    /10  SUBJECTIVE: Nothing new to report. OBJECTIVE: [id number]    Exercise/Equipment Resistance/Repetitions Other comments   NuStep 5 min           Supine leg press  Supine hip flexion 3 min  3 min Manual resistance   Clams #1 2x10 BLE    Bridge    Single limb x10  x10    PHE    Bent knee x10  x10 RLE In side-lying this session   PKF 2x5 RLE In sidelying this session   Supine clams x15 yellow   Supine hip neuro re-ed 3 min RLE    LAQ x10 RLE With TB assist   Seated hamstring curls x10 With maxislide   Sit-to-stand training nv                                TG 5 min                    Other Therapeutic Activities:      Manual Treatments:         Modalities:      Test/Measurements:         ASSESSMENT:    Pt tolerated session well. Early fatigue noted throughout. Minimal glut and hamstring activation noted - tactile cueing assisted in muscle contraction. Pt with increased understanding of exercise, allowed for better focus on individual tasking of each exercise.     Treatment/Activity Tolerance:   [x]Patient tolerated treatment well [] Patient limited by fatique  []Patient limited by pain [] Patient limited by other medical complications  [] Other:     Goals:    Short term goals  Time Frame for Short term goals: 6 weeks  Short term goal 1: pt will be indep in HEP  Short term goal 2: pt will increase R DF strength by 1/3 muscle grade  Short term goal 3: pt will ambulate with increased glut activation RLE  Short term goal 4: pt will decrease TUG time 10 sec  Short term goal 5: pt will increase LEFS score by 10 points to hit MCID           Plan: [x] Continue per plan of care [] Alter current plan (see comments)   [] Plan of care initiated [] Hold pending MD visit [] Discharge      Plan for Next Session:      Re-Certification Due Date:         Signature:  Lynsey Hewitt , PT

## 2021-04-08 ENCOUNTER — HOSPITAL ENCOUNTER (OUTPATIENT)
Dept: PHYSICAL THERAPY | Age: 66
Setting detail: THERAPIES SERIES
Discharge: HOME | End: 2021-04-08
Payer: MEDICARE

## 2021-04-08 ENCOUNTER — HOSPITAL ENCOUNTER (OUTPATIENT)
Dept: OCCUPATIONAL THERAPY | Age: 66
Setting detail: THERAPIES SERIES
Discharge: HOME | End: 2021-04-08
Payer: MEDICARE

## 2021-04-08 ENCOUNTER — HOSPITAL ENCOUNTER (OUTPATIENT)
Dept: SPEECH THERAPY | Age: 66
Setting detail: THERAPIES SERIES
Discharge: HOME | End: 2021-04-08
Payer: MEDICARE

## 2021-04-08 PROCEDURE — 97112 NEUROMUSCULAR REEDUCATION: CPT

## 2021-04-08 PROCEDURE — 97140 MANUAL THERAPY 1/> REGIONS: CPT

## 2021-04-08 PROCEDURE — 92507 TX SP LANG VOICE COMM INDIV: CPT

## 2021-04-08 PROCEDURE — 97110 THERAPEUTIC EXERCISES: CPT

## 2021-04-08 NOTE — FLOWSHEET NOTE
[facility], [facility],  Miriam Brunner, [address]   Phone: [phone]  Fax [phone]      Speech-Language Pathology  Daily Treatment Note    Date:  2021    Patient Name:  Mis Erickson    :  1955  MRN: [id number]  Restrictions/Precautions:  Limited communication ability due to significant Aphasia  Treatment Diagnosis:    Codes     Aphasia as late effect of cerebrovascular accident (CVA)    -  Primary   Insurance/Certification information: Medicare A & B; [address]   Referring Physician:  Whitley Pantoja. Sue Cabral MD  Plan of care signed (Y/N):  Y  Visit# / total visits:     Pain level: No reported or suspected pain noted     Progress Note: []  Yes  [x]  No  Next due by: Visit #10: 6/10 or 21     Subjective: The pt came to the session alone. He was in good spirits but still demonstrates significant frustration when difficulty is encountered. This was again addressed with the pt with the need to attempt to stay relaxed even when difficulty is encountered stressed to him. Objective:   Short-term Goals:  Goal 1: The pt will say indivdual phrase and short sentences, in response to therapy tasks, with 70% accuracy, mod-max cues:  - 80% (8/10), mod-max cues and use of modified NILSA; targeted via giving open ended responses to role-playing questions. WILL D/C GOAL AS MET IF ABLE TO MAINTAIN ACCURACY    Goal 2: The pt will complete basic confrontational and responsive naming with 75% accuracy, mod-max cues:  - Confrontational: 100% (10/10)  - Responsive: 90% (9/10)    GOAL MET    Goal 3: The pt will use an AAC speech generating device to answer given basic questions with 80% accuracy, mod-max cues:  - Goal targeted indirectly through other treatment tasks. Goal 4: The pt will write single basic words with 50% accuracy, mod-max cues:  - Spelling basic words given spaced for the numbers of letters: 0% (0/4);  Unable to complete

## 2021-04-08 NOTE — FLOWSHEET NOTE
[facility], [facility], [address], [address]  Phone: [phone]  Fax [phone]    Physical Therapy Daily Treatment Note    Date:  2021    Patient Name:  Marshall Jones    :  1955  MRN: [id number]  Restrictions/Precautions:    Medical/Treatment Diagnosis Information:  · Diagnosis: CVA, R hemiparesis  Insurance/Certification information:  PT Insurance Information: Medicare  Physician Information:  Referring Practitioner: Ashu Walker MD  Plan of care signed (Y/N):  N  Visit# / total visits:       G-Code (if applicable):          LEFS     Medicare Cap (if applicable):  033 = total amount used, updated 2021    Time in:   9:45    Timed Treatment: 45  Total Treatment Time:  45  ________________________________________________________________________________________    Pain Level:    /10  SUBJECTIVE: Nothing new to report. OBJECTIVE: [id number]    Exercise/Equipment Resistance/Repetitions Other comments   NuStep 5 min           Supine leg press  Supine hip flexion 3 min  3 min Manual resistance   Clams #1 2x10 BLE    Bridge    Single limb x10  x10    PHE    Bent knee x10  x10 RLE In side-lying this session   PKF 2x5 RLE In sidelying this session   Supine clams x15 yellow   Supine hip neuro re-ed 3 min RLE    LAQ x10 RLE With TB assist   Seated hamstring curls x10 With maxislide   Sit-to-stand training nv    Side-lying knee extension/flexion x10 RLE           Slant board 3x30\"           TG 5 min                    Other Therapeutic Activities:      Manual Treatments:         Modalities:      Test/Measurements:         ASSESSMENT:    Pt tolerated session well. Early fatigue noted throughout. Minimal glut and hamstring activation noted - tactile cueing assisted in muscle contraction. Pt with increased understanding of exercise, allowed for better focus on individual tasking of each exercise.     Treatment/Activity

## 2021-04-08 NOTE — FLOWSHEET NOTE
pain  []Patient limited by other medical complications   []Other:     Assessment:   Pt tolerating NMR and facilitation of normal movement patterns using techniques to reduce gravity and isolate intended muscle groups. Cont per OT poc.     GOALS:  Patient goal: \" Get right arm working. Move back into home.  \"     Short Term Goals:  to be met in 3 weeks (by 4/15/2021)     Pt will demonstrate active assisted right shoulder flexion to 50 degrees or better, gravity reduced, while rating pain < 2/10 for improved ROM for functional task performance.     Pt will demonstrate 15o degrees of active assisted elbow flexion against gravity, while rating pain < 0/10 for improved ROM for functional task performance.     Pt and caregiver will independently verbalize 2 techniques to obtain optimal positioning of on right vic arm to ensure approximation of glenohumeral joint and decrease risk of complications from subluxation.      Pt will participate in cont assessment of visual perceptual status by completing visual tracking and visual field test. . - GOAL MET, 4/6/21, no apparent visual deficits     Pt will report consistent compliance with HEP at least 4x/wk        Long Term Goals:  to be met in 6 weeks (by 5/6/2021)     Pt will demonstrate active assisted right shoulder flexion to 50 degrees, against gravity, while rating pain < 2/10 for improved ROM for functional task performance.     Pt will demonstrate 25o degrees of right active elbow flexion,against gravity, while rating pain < 0/10 for improved ROM for functional task performance.     Pt will demonstrate 20o degrees of right active elbow extension while rating pain < 0/10 for improved ROM for functional task performance.     Pt will tolerate WBing for 5 min through RUE with elbow and wrist extension for tone management in prep for active assisted movement.          Prognosis: [x]Good   []Fair   []Poor    Patient Requires Follow-up:  [x]Yes  []No    Plan: []Plan of care initiated     [x]Continue per plan of care    [] Alter current plan (see comments)    []Hold pending MD visit []Discharge      ---  ---- --- ---- --- ---- --- ---- --- ---- --- ---- --- ---- --- ---- --- ---- --- --- ---    Therapeutic Exercise/Home Exercise Program:    minutes    Therapeutic Activity:  0 minutes    ADL Training:  minutes      Neuromuscular Re-Education:  30 minutes      Manual Therapy: 15  minutes    Splintin minutes     Modalities:  0 minutes    Time in:          1115    Time Out:   1200     Timed Code Treatment Minutes:   45 minutes        Total Treatment Time:  45 minutes           Electronically signed by:  QI Kiran/BETINA

## 2021-04-13 ENCOUNTER — HOSPITAL ENCOUNTER (OUTPATIENT)
Dept: PHYSICAL THERAPY | Age: 66
Setting detail: THERAPIES SERIES
Discharge: HOME | End: 2021-04-13
Payer: MEDICARE

## 2021-04-13 ENCOUNTER — HOSPITAL ENCOUNTER (OUTPATIENT)
Dept: SPEECH THERAPY | Age: 66
Setting detail: THERAPIES SERIES
Discharge: HOME | End: 2021-04-13
Payer: MEDICARE

## 2021-04-13 ENCOUNTER — HOSPITAL ENCOUNTER (OUTPATIENT)
Dept: OCCUPATIONAL THERAPY | Age: 66
Setting detail: THERAPIES SERIES
Discharge: HOME | End: 2021-04-13
Payer: MEDICARE

## 2021-04-13 PROCEDURE — 97535 SELF CARE MNGMENT TRAINING: CPT

## 2021-04-13 PROCEDURE — 92507 TX SP LANG VOICE COMM INDIV: CPT

## 2021-04-13 PROCEDURE — 97112 NEUROMUSCULAR REEDUCATION: CPT

## 2021-04-13 PROCEDURE — 97110 THERAPEUTIC EXERCISES: CPT

## 2021-04-13 NOTE — FLOWSHEET NOTE
[facility], [facility] [address] [place] [address]   Phone: [phone]  Fax [phone]      Outpatient Occupational Therapy     [x] Daily Treatment Note   [] Progress Note   [] Discharge Note    Date:  4/13/2021    Patient Name:  Car Patel         YOB: 1955    Medical Diagnosis/ICD10[de-identified]   CVA/I63.9, Right hemiparesis, Right side neglect, Expressive aphasia  Treatment Diagnosis: Impaired self care status due to decreased functional use of RUE/RLE  Referring Physician: Dr. Ny Benavidez    Referral Date:  3/15/2021  Onset Date:  Nov 2019  Visits Allowed/Insurance/Certification Information:   Medicare A/B, AARP  Restrictions/Precautions Right vic, aphasia    Plan of care sent to provider:    [x]Faxed (date: 4/6/2021  ) []Co-signature    (attempts: 1[x] 2 []3[])        Plan of care signed:    [x]Yes (date:4/7/21  )           []No       Progress Note covers period from (if applicable):    [x]NA    []From 3/25/21         To           Next Progress Note due:   4/22/2021    Visit# / total visits:  5/12    Functional Outcome Measure:   3/25/2021: QuickDASH: 42    Subjective: Pt arrived alone. Pt ambulating with SBQC. Pain level: denies    Plan for Next Session:  WBing  Arm skate/table top towel slides  PROM in supine    Self ROM    Objective:       Exercises:    Exercises in bold performed in department today. Items not bolded are carried forward from prior visits for continuity of the record.   Exercise/Equipment Resistance/Repetitions HEP Other comments      ADL/IADL TRAINING       LB dressing Doff/don right shoe and AFO with modified independence as pt required increased time    [] Increased effort this date d/t new shoes       []        []        []       NEUROMUSCULAR RE-EDU and THERAPEUTIC ACTIVITY        Education on stroke recovery Role of OT  Tone and management of tone  Typical progression of UE function after zafar   [] Pt's sister-in-law verbalized understanding   Pt will benefit from reinforcement d/t communication deficits. WBing RUE, fully ext  EOM  8 min    Weight shifting toward right for increased proprioceptive input [] Denies pain with WBing   Right shoulder movement Use of inflatable arm splint to isolate shoulder muscles for facilitating normal movement patterns and reduce compensation    EOM - use of table top at shld ht  Active:Horizontal adduction  Active assisted: Horizontal abduction    Supine - Active assisted:   shld flex , 10x  shld ext, 10x  Elbow flex, 10x  Elbow ext, 5x           Noted pt initiating movement, even against gravity. Increased effort required - cues for breathing    Demo flex synergy, clemente with attempts at active elbow ext   Card sorting Unable to follow commands for sorting by suit. Able to sort by color with min cues/explaination  Sorting entire deck with only 1 error  Completed while WBing to facilitate weight shift to right           Arm skate Seated EOM, using rolling/hieght adjustable table    Pushing cones of edge of table (16x)    Demo active movement for: (flexor synergy)  Horizontal adduction, elbow flex and shld ext    Active assist for:  Horizontal abduction, elbow ext, shld flex [] Increased effort required - cues for breathing        THERAPEUTIC EXERCISE and MANUAL TECHNIQUES     AAROM  Attempted active assisted shld flexion    Pt demo shld abduction, elbow/wrist flex with attempts at active movement. PROM RUE  - Supine    Shld flexion to ~90o  Shld abduction to ~90o  ER to ~10-15o  Elbow ext  Wrist ext to neutral with increased effort  Digit ext with increased effort   [] Pt's sister in law trained on PROM techniques for shld flex, shld abd and elbow ext. She returned demo and verbalized understanding. Emailed program via [address] [place] [address].       Self ROM shld flexion while supine  Instructed to avoid flex >90  Pt returned demo [] Reviewed for HEP       [] MODALITIES         []        []       SPLINTING       RHS Pt reports pre-bettina resting hand splint at home as recommended by home OT. Pt reports difficult to don independently []      Home Exercise Program: PROM by caregiver    Treatment/Activity Tolerance:    Patients response to treatment:   [x]Patient tolerated treatment well []Patient limited by fatigue   []Patient limited by pain  []Patient limited by other medical complications   []Other:     Assessment:   Pt tolerating NMR and facilitation of normal movement patterns using techniques to reduce gravity and isolate intended muscle groups. Pt also tolerating WBing ext elbow/wrist and digits while EOM. Demo decreased flexor tone, although effects only temporary. Cont per OT poc.     GOALS:  Patient goal: \" Get right arm working. Move back into home.  \"     Short Term Goals:  to be met in 3 weeks (by 4/15/2021)     Pt will demonstrate active assisted right shoulder flexion to 50 degrees or better, gravity reduced, while rating pain < 2/10 for improved ROM for functional task performance.     Pt will demonstrate 15o degrees of active assisted elbow flexion against gravity, while rating pain < 0/10 for improved ROM for functional task performance.     Pt and caregiver will independently verbalize 2 techniques to obtain optimal positioning of on right vic arm to ensure approximation of glenohumeral joint and decrease risk of complications from subluxation.      Pt will participate in cont assessment of visual perceptual status by completing visual tracking and visual field test. . - GOAL MET, 4/6/21, no apparent visual deficits     Pt will report consistent compliance with HEP at least 4x/wk        Long Term Goals:  to be met in 6 weeks (by 5/6/2021)     Pt will demonstrate active assisted right shoulder flexion to 50 degrees, against gravity, while rating pain < 2/10 for improved ROM for functional task performance.     Pt will demonstrate 25o degrees of right active elbow flexion,against gravity, while rating pain < 0/10 for improved ROM for functional task performance.     Pt will demonstrate 20o degrees of right active elbow extension while rating pain < 0/10 for improved ROM for functional task performance.     Pt will tolerate WBing for 5 min through RUE with elbow and wrist extension for tone management in prep for active assisted movement.          Prognosis: [x]Good   []Fair   []Poor    Patient Requires Follow-up:  [x]Yes  []No    Plan: []Plan of care initiated     [x]Continue per plan of care    [] Alter current plan (see comments)    []Hold pending MD visit []Discharge      ---  ---- --- ---- --- ---- --- ---- --- ---- --- ---- --- ---- --- ---- --- ---- --- --- ---    Therapeutic Exercise/Home Exercise Program:    minutes    Therapeutic Activity:  0 minutes    ADL Training: 15  minutes      Neuromuscular Re-Education:  30 minutes      Manual Therapy:   minutes    Splintin minutes     Modalities:  0 minutes    Time in:          1115    Time Out:   1200     Timed Code Treatment Minutes:   45 minutes        Total Treatment Time:  45 minutes           Electronically signed by:  QI Dale/BETINA

## 2021-04-13 NOTE — FLOWSHEET NOTE
[facility], [facility], [address], [address]  Phone: [phone]  Fax [phone]    Physical Therapy Daily Treatment Note    Date:  2021    Patient Name:  Susi West    :  1955  MRN: [id number]  Restrictions/Precautions:    Medical/Treatment Diagnosis Information:  · Diagnosis: CVA, R hemiparesis  Insurance/Certification information:  PT Insurance Information: Medicare  Physician Information:  Referring Practitioner: Leigh Ann Carnes MD  Plan of care signed (Y/N):  N  Visit# / total visits:       G-Code (if applicable):          LEFS     Medicare Cap (if applicable):  716 = total amount used, updated 2021    Time in:   9:45    Timed Treatment: 45  Total Treatment Time:  45  ________________________________________________________________________________________    Pain Level:    /10  SUBJECTIVE: Pt reports that he is \"fine. \" Notes that the exercises are making him sore. He has been unable to do the HEP because he cannot figure out how to keep the exercises logged on. OBJECTIVE: [id number]    Exercise/Equipment Resistance/Repetitions Other comments   NuStep 5 min           Supine leg press  Supine hip flexion 3 min  3 min Manual resistance   Clams #1 2x10 BLE    Bridge    Single limb x10  x10    PHE    Bent knee x10  x10 RLE In side-lying this session   PKF 2x5 RLE In sidelying this session   Supine clams x15 yellow   Supine hip neuro re-ed 3 min RLE    LAQ x10 RLE With TB assist   Seated hamstring curls x10 With maxislide   Sit-to-stand training nv    Side-lying knee extension/flexion x10 RLE           Slant board 3x30\"           TG 5 min                    Other Therapeutic Activities:      Manual Treatments:         Modalities:      Test/Measurements:         ASSESSMENT:    Pt tolerated session well. Minimal glut and hamstring activation noted - tactile cueing assisted in muscle contraction.  Performed

## 2021-04-13 NOTE — FLOWSHEET NOTE
[facility], [place] [address] [address]  [address] [place] [address], [address] [place] [address]   Phone: [phone]  Fax [phone]      Speech-Language Pathology  Daily Treatment Note    Date:  2021    Patient Name:  Randa Aquino    :  1955  MRN: [id number]  Restrictions/Precautions:  Limited communication ability due to significant Aphasia  Treatment Diagnosis:    Codes     Aphasia as late effect of cerebrovascular accident (CVA)    -  Primary   Insurance/Certification information: Medicare A & B; [address]   Referring Physician:  Jamar Richardson. Elian Zapata MD  Plan of care signed (Y/N):  Y  Visit# / total visits:     Pain level: No reported or suspected pain noted     Progress Note: []  Yes  [x]  No  Next due by: Visit #10: 7/10 or 21     Subjective: The pt came to the session alone. He continues to need reminders to not become overly frustrated when difficulty with communication occurs. Objective:   Short-term Goals:  Goal 1: The pt will say indivdual phrase and short sentences, in response to therapy tasks, with 70% accuracy, mod-max cues:  - 90% (8/10), mod-max cues and use of modified NILSA; targeted via picture descriptions  GOAL MET    Goal 2: NEW GOAL: The pt will complete basic confrontational and responsive naming with 90% accuracy, min cues  - Confrontational: 70% (7/10), min cues  - Responsive: 80% (8/10), min cues    Goal 3: The pt will use an AAC speech generating device to answer given basic questions with 80% accuracy, mod-max cues:  - 25% (1/4), mod-max cues; with task requiring him to navigate between 1 to 2 pages and point to icons to answer basic questions. Goal 4: The pt will write single basic words with 50% accuracy, mod-max cues:  - Goal targeted indirectly through other treatment tasks. Goal 5:  The pt will complete basic confrontational and responsive naming with 75% accuracy, mod-max cues:  -  GOAL MET    NEW GOAL:  The pt will say

## 2021-04-15 ENCOUNTER — HOSPITAL ENCOUNTER (OUTPATIENT)
Dept: OCCUPATIONAL THERAPY | Age: 66
Setting detail: THERAPIES SERIES
Discharge: HOME | End: 2021-04-15
Payer: MEDICARE

## 2021-04-15 ENCOUNTER — HOSPITAL ENCOUNTER (OUTPATIENT)
Dept: SPEECH THERAPY | Age: 66
Setting detail: THERAPIES SERIES
Discharge: HOME | End: 2021-04-15
Payer: MEDICARE

## 2021-04-15 ENCOUNTER — HOSPITAL ENCOUNTER (OUTPATIENT)
Dept: PHYSICAL THERAPY | Age: 66
Setting detail: THERAPIES SERIES
Discharge: HOME | End: 2021-04-15
Payer: MEDICARE

## 2021-04-15 PROCEDURE — 92507 TX SP LANG VOICE COMM INDIV: CPT

## 2021-04-15 PROCEDURE — 97110 THERAPEUTIC EXERCISES: CPT

## 2021-04-15 PROCEDURE — 97140 MANUAL THERAPY 1/> REGIONS: CPT

## 2021-04-15 PROCEDURE — 97112 NEUROMUSCULAR REEDUCATION: CPT

## 2021-04-15 PROCEDURE — 97535 SELF CARE MNGMENT TRAINING: CPT

## 2021-04-15 NOTE — FLOWSHEET NOTE
[facility] [place] [address] [address]  [facility], [address] [place] [address]   Phone: [phone]  Fax [phone]      Speech-Language Pathology  Daily Treatment Note    Date:  4/15/2021    Patient Name:  María Ospina    :  1955  MRN: [id number]  Restrictions/Precautions:  Limited communication ability due to significant Aphasia  Treatment Diagnosis:    Codes     Aphasia as late effect of cerebrovascular accident (CVA)    -  Primary   Insurance/Certification information: Medicare A & B; [address]   Referring Physician:  Fidel Chun MD  Plan of care signed (Y/N):  Y  Visit# / total visits:     Pain level: No reported or suspected pain noted     Progress Note: []  Yes  [x]  No  Next due by: Visit #10: 8/10 or 21     Subjective: The pt continues to quickly becoming frustrated due to his Aphasia needing ongoing reminders to remain calm and to use his word retrieval strategies. Objective:   Short-term Goals:  Goal 1: NEW GOAL: The pt will say indivdual phrase and short sentences, in response to therapy tasks, with 90% accuracy, mod cues:  - 40% (6/15), mod cues    Goal 2: NEW GOAL: The pt will complete basic confrontational and responsive naming with 90% accuracy, min cues  - Confrontational: 73% (11/15), min cues  - Responsive: 60% (6/10), min cues    Goal 3: The pt will use an AAC speech generating device to answer given basic questions with 80% accuracy, mod-max cues:  - Goal not targeted this date. The pt did not bring in his iPad this date to allow targeting of AAC panda. Goal 4: The pt will write single basic words with 50% accuracy, mod-max cues:  - Goal targeted indirectly through other treatment tasks. Goal 5:  The pt will complete basic confrontational and responsive naming with 75% accuracy, mod-max cues:  -  GOAL MET    Goal 6: The pt will say indivdual phrase and short sentences, in response to therapy tasks, with 70% accuracy, mod-max cues:  - GOAL MET      Assessment:   Continued improvements in production of spoken phrases and short-sentences. Plan:   Continued Speech Therapy services 2 x week for 6 weeks. Total Treatment Time / Charges     Time in Time out Total Time / units   Cognitive Tx         Speech Tx 1030 1115 45 min / 1 unit   Dysphagia Tx           Signature:     Modesta Botello MA, [address] [facility]  [id number]  Speech-Language Pathologist  Phone #: [phone]  Fax #: [phone]

## 2021-04-15 NOTE — FLOWSHEET NOTE
assisted in muscle contraction. Performed all exercise with AFO off at this time.      Treatment/Activity Tolerance:   [x]Patient tolerated treatment well [] Patient limited by fatique  []Patient limited by pain [] Patient limited by other medical complications  [] Other:     Goals:    Short term goals  Time Frame for Short term goals: 6 weeks  Short term goal 1: pt will be indep in HEP  Short term goal 2: pt will increase R DF strength by 1/3 muscle grade  Short term goal 3: pt will ambulate with increased glut activation RLE  Short term goal 4: pt will decrease TUG time 10 sec  Short term goal 5: pt will increase LEFS score by 10 points to hit MCID           Plan: [x] Continue per plan of care [] Alter current plan (see comments)   [] Plan of care initiated [] Hold pending MD visit [] Discharge      Plan for Next Session:      Re-Certification Due Date:         Signature:  Gin Forrest , PT

## 2021-04-15 NOTE — FLOWSHEET NOTE
[facility], [facility], [address] [facility], [address] [place] [address]   Phone: [phone]  Fax [phone]      Outpatient Occupational Therapy     [x] Daily Treatment Note   [] Progress Note   [] Discharge Note    Date:  4/15/2021    Patient Name:  Leoenla Pederson         YOB: 1955    Medical Diagnosis/ICD10[de-identified]   CVA/I63.9, Right hemiparesis, Right side neglect, Expressive aphasia  Treatment Diagnosis: Impaired self care status due to decreased functional use of RUE/RLE  Referring Physician: Dr. Sandra Houser    Referral Date:  3/15/2021  Onset Date:  Nov 2019  Visits Allowed/Insurance/Certification Information:   Medicare A/B, AARP  Restrictions/Precautions Right vic, aphasia    Plan of care sent to provider:    [x]Faxed (date: 4/6/2021  ) []Co-signature    (attempts: 1[x] 2 []3[])        Plan of care signed:    [x]Yes (date:4/7/21  )           []No       Progress Note covers period from (if applicable):    [x]NA    []From 3/25/21         To           Next Progress Note due:   4/22/2021    Visit# / total visits:  6/12    Functional Outcome Measure:   3/25/2021: QuickDASH: 42    Subjective: Pt arrived alone. Pt ambulating with SBQC. Pain level: denies    Plan for Next Session:  WBing  Arm skate/table top towel slides  PROM in supine    Self ROM    Objective:       Exercises:    Exercises in bold performed in department today. Items not bolded are carried forward from prior visits for continuity of the record.   Exercise/Equipment Resistance/Repetitions HEP Other comments      ADL/IADL TRAINING       LB dressing Doff/don right shoe and AFO with modified independence as pt required increased time    [] Increased effort this date d/t new shoes    Per PT, pt no longer requires AFO 4/15/21     Functional Mobility Pt ambulating with SBQC, w/o AFO - with mod ind  OT<>bathroom  OT>parking lot []    ADLs Pt performed toileting tasks with modified indepedence []        []       NEUROMUSCULAR RE-EDU and THERAPEUTIC ACTIVITY        Education on stroke recovery Role of OT  Tone and management of tone  Typical progression of UE function after stoke   [] Pt's sister-in-law verbalized understanding   Pt will benefit from reinforcement d/t communication deficits. WBing RUE, fully ext  EOM  6 min    Weight shifting toward right for increased proprioceptive input [] Denies pain with WBing   Right shoulder movement Use of inflatable arm splint to isolate shoulder muscles for facilitating normal movement patterns and reduce compensation    EOM - use of table top at shld ht  Active:Horizontal adduction  Active assisted: Horizontal abduction    Supine - Active assisted:   shld flex , 10x  shld ext, 10x  Elbow flex, 10x  Elbow ext, 5x           Noted pt initiating movement, even against gravity. Increased effort required - cues for breathing    Demo flex synergy, clemente with attempts at active elbow ext   Card sorting Unable to follow commands for sorting by suit. Able to sort by color with min cues/explaination  Sorting entire deck with only 1 error  Completed while WBing to facilitate weight shift to right           Arm skate Seated EOM, using rolling/hieght adjustable table    Pushing cones of edge of table (16x)    Demo active movement for: (flexor synergy)  Horizontal adduction, elbow flex and shld ext    Active assist for:  Horizontal abduction, elbow ext, shld flex [] Increased effort required - cues for breathing        THERAPEUTIC EXERCISE and MANUAL TECHNIQUES     AAROM  Attempted active assisted shld flexion    Pt demo shld abduction, elbow/wrist flex with attempts at active movement. PROM RUE  - Supine    Shld flexion to ~90o  Shld abduction to ~90o  ER to ~10-15o  Elbow ext  Wrist ext to neutral with increased effort  Digit ext with increased effort   [] Pt's sister in law trained on PROM techniques for shld flex, shld abd and elbow ext.  She returned demo and verbalized understanding. Emailed program via [employer]. Self ROM shld flexion while supine  Instructed to avoid flex >90  Pt returned demo [] Reviewed for HEP     Scap mobs  - right EOM - All direction    Supine - scap adduction []       MODALITIES         []        []       SPLINTING       RHS Pt reports pre-bettina resting hand splint at home as recommended by home OT. Pt reports difficult to don independently []      Home Exercise Program: PROM by caregiver    Treatment/Activity Tolerance:    Patients response to treatment:   [x]Patient tolerated treatment well []Patient limited by fatigue   []Patient limited by pain  []Patient limited by other medical complications   []Other:     Assessment:   Pt tolerating NMR and facilitation of normal movement patterns using techniques to reduce gravity and isolate intended muscle groups. Pt also tolerating WBing ext elbow/wrist and digits while EOM. Demo decreased flexor tone, although effects only temporary. Cont per OT poc.     GOALS:  Patient goal: \" Get right arm working. Move back into home.  \"     Short Term Goals:  to be met in 3 weeks (by 4/15/2021)     Pt will demonstrate active assisted right shoulder flexion to 50 degrees or better, gravity reduced, while rating pain < 2/10 for improved ROM for functional task performance.     Pt will demonstrate 15o degrees of active assisted elbow flexion against gravity, while rating pain < 0/10 for improved ROM for functional task performance.     Pt and caregiver will independently verbalize 2 techniques to obtain optimal positioning of on right vic arm to ensure approximation of glenohumeral joint and decrease risk of complications from subluxation.      Pt will participate in cont assessment of visual perceptual status by completing visual tracking and visual field test. . - GOAL MET, 4/6/21, no apparent visual deficits     Pt will report consistent compliance with HEP at least 4x/wk        Long

## 2021-04-20 ENCOUNTER — HOSPITAL ENCOUNTER (OUTPATIENT)
Dept: PHYSICAL THERAPY | Age: 66
Setting detail: THERAPIES SERIES
Discharge: HOME | End: 2021-04-20
Payer: MEDICARE

## 2021-04-20 PROCEDURE — 97112 NEUROMUSCULAR REEDUCATION: CPT

## 2021-04-20 PROCEDURE — 97110 THERAPEUTIC EXERCISES: CPT

## 2021-04-20 NOTE — FLOWSHEET NOTE
[facility], [facility], [facility] [address]  Phone: [phone]  Fax [phone]    Physical Therapy Daily Treatment Note    Date:  2021    Patient Name:  Chapincito Donnelly    :  1955  MRN: [id number]  Restrictions/Precautions:    Medical/Treatment Diagnosis Information:  · Diagnosis: CVA, R hemiparesis  Insurance/Certification information:  PT Insurance Information: Medicare  Physician Information:  Referring Practitioner: Nohemi Gr MD  Plan of care signed (Y/N):  N  Visit# / total visits:       G-Code (if applicable):          LEFS     Medicare Cap (if applicable):  241 = total amount used, updated 2021    Time in:   12:45   Timed Treatment: 45  Total Treatment Time:  45  ________________________________________________________________________________________    Pain Level:    /10  SUBJECTIVE: Pt reports that he is no longer wearing the AFO. No other complaints. OBJECTIVE: [id number]    Exercise/Equipment Resistance/Repetitions Other comments   NuStep 5 min           Supine leg press  Supine hip flexion 3 min  3 min Manual resistance   Clams #1 2x10 BLE    Bridge    Single limb x10  x10    PHE    Bent knee x10  x10 RLE In side-lying this session   PKF 2x5 RLE In sidelying this session   Supine clams x15 yellow   Supine hip neuro re-ed 3 min RLE    LAQ x10 RLE With TB assist   Seated hamstring curls x10 With maxislide   Sit-to-stand training nv    Side-lying knee extension/flexion x10 RLE           Slant board 3x30\"    Seated QS 10x5\" RLE With hamstring stretch   TG 5 min    Standing step over object 3 min RLE             Other Therapeutic Activities:      Manual Treatments:         Modalities:      Test/Measurements:         ASSESSMENT:    Pt tolerated session well. Minimal glut and hamstring activation noted - tactile cueing assisted in muscle contraction. No longer wearing AFO.     Treatment/Activity Tolerance:   [x]Patient tolerated treatment well [] Patient limited by fatique  []Patient limited by pain [] Patient limited by other medical complications  [] Other:     Goals:    Short term goals  Time Frame for Short term goals: 6 weeks  Short term goal 1: pt will be indep in HEP  Short term goal 2: pt will increase R DF strength by 1/3 muscle grade  Short term goal 3: pt will ambulate with increased glut activation RLE  Short term goal 4: pt will decrease TUG time 10 sec  Short term goal 5: pt will increase LEFS score by 10 points to hit MCID           Plan: [x] Continue per plan of care [] Alter current plan (see comments)   [] Plan of care initiated [] Hold pending MD visit [] Discharge      Plan for Next Session:      Re-Certification Due Date:         Signature:  Hank Martinez PT

## 2021-04-22 ENCOUNTER — APPOINTMENT (OUTPATIENT)
Dept: OCCUPATIONAL THERAPY | Age: 66
End: 2021-04-22
Payer: MEDICARE

## 2021-04-22 ENCOUNTER — APPOINTMENT (OUTPATIENT)
Dept: SPEECH THERAPY | Age: 66
End: 2021-04-22
Payer: MEDICARE

## 2021-04-27 ENCOUNTER — HOSPITAL ENCOUNTER (OUTPATIENT)
Dept: SPEECH THERAPY | Age: 66
Setting detail: THERAPIES SERIES
Discharge: HOME | End: 2021-04-27
Payer: MEDICARE

## 2021-04-27 PROCEDURE — 92507 TX SP LANG VOICE COMM INDIV: CPT

## 2021-04-27 NOTE — PROGRESS NOTES
[facility], [facility],  Miriam Chaney, [address]  Phone: [phone]  Fax [phone]        Speech Therapy Progress Note         The following patient has been evaluated for therapy services. Please review the attached summary of the patient's plan of care, and verify that you agree with plan for additional therapy services at this time. Thank you for the referral of this patient. Please sign the attached certification form. Physician signature_______________________ Date________________      Fax to: [facility] [phone]         Date: 2021        Patient Name:  Palma Mac    :  1955  MRN: [id number]  Restrictions/Precautions:  Limited communication ability due to significant Aphasia  Treatment Diagnosis:     Codes     Aphasia as late effect of cerebrovascular accident ([address]   Insurance/Certification information: Medicare A & B; Carol Lyndsey   Referring Physician:  Margurette Seip. Cherene Soulier, MD  Plan of care signed (Y/N):  Y  Visit# / total visits:     Pain level: No reported or suspected pain noted           Time Period for Report:  3/23/21 to 21  Cancels/No-shows to date:  None    Plan of Care/Treatment to date:  [] Speech-Language Evaluation/Treatment    [] Dysphagia Evaluation/Treatment        [] Dysphagia Treatment via Neuromuscular Electrical Stimulation (NMES)   [] Modified Barium Swallowing Study  [] Fiberoptic Endoscopic Evaluation of Swallowing (FEES)    [] Cognitive-Linguistic Skills Development  [] Voice evaluation and Treatment      [] Evaluation, modification, and Training of Voice Prosthetic     [] Evaluation for Speech-Generating Augmentative and Alternative Communication Device   [] Therapeutic Services for the use of Speech-Generating Device.    [] Other:     Significant Findings At Last Visit/Comments:    Subjective:  ·  The pt was seen for the first time since 4/15/21 due to the pt's schedule. He was accompanied by his sister-in-law Ju. Both reported that the pt has been speaking much more. His sister-in-law said that people at the pt's facility have mentioned that the pt is saying more words, phrases and short sentences. Objective:   Observation: Improve overall verbal output; Overall, the pt remains easily frustrated when encountering difficulty with verbal expression but was less frustrated today. Assessment:   Summary: The pt has made significant progress in therapy. With prompts the pt is able to say many more words and phrases than before, making consistent progress toward his verbal expression goals. Increased spontaneous word and phrase productions are also noted. Writing/spelling has been the pt's weakness with less progress made toward that goal. Will continue to target all of his short-term goals with further therapy 2 x week for 4 weeks recommended due to the significant progress made in therapy to this point.  Patient's response to treatment: Easily frustrated by his Aphasia but motivated and hardworking     Progress towards goals:    Short-term Goals:  Goal 1: NEW GOAL: The pt will say indivdual phrase and short sentences, in response to therapy tasks, with 90% accuracy, mod cues:  - 60% (6/10), mod cues  GOAL PARTIALLY MET     Goal 2: NEW GOAL: The pt will complete basic confrontational and responsive naming with 90% accuracy, min cues  - Confrontational: 73%, min cues  - Responsive: 80%, min cues  GOAL PARTIALLY MET     Goal 3: The pt will use an AAC speech generating device to answer given basic questions with 80% accuracy, mod-max cues:  - GOAL  NOT MET; Therapy has been focusing more on the pt's verbal expression 2/2 gains made in that area. Goal 4: The pt will write single basic words with 50% accuracy, mod-max cues:  - GOAL NOT MET     Goal 5:  The pt will complete basic confrontational and responsive naming with 75% accuracy, mod-max cues:  -  GOAL MET     Goal 6: The pt will say indivdual phrase and short sentences, in response to therapy tasks, with 70% accuracy, mod-max cues:  - GOAL MET       Current Frequency/Duration:  # Days per week: [] 1 day # Weeks: [] 1 week [x] 4 weeks      [x] 2 days   [] 2 weeks [] 5 weeks      [] 3 days   [] 3 weeks [] 6 weeks     Rehab Potential: [] Excellent [x] Good [] Fair  [] Poor     Goal Status:  [] Achieved [x] Partially Achieved  [] Not Achieved     Patient Status: [] Continue per initial plan of Care     [] Patient now discharged     [x] Additional visits requested, Please re-certify for additional visits:      Requested frequency/duration: 2  X/week for 4 weeks    Electronically signed by:  CORWIN Charles  Phone: [phone]  Fax: [phone]    If you have any questions or concerns, please don't hesitate to call.   Thank you for your referral.

## 2021-04-27 NOTE — FLOWSHEET NOTE
[facility], [facility], [address], [address]   Phone: [phone]  Fax [phone]      Speech-Language Pathology  Daily Treatment Note    Date:  2021    Patient Name:  Emili Wheeler    :  1955  MRN: [id number]  Restrictions/Precautions:  Limited communication ability due to significant Aphasia  Treatment Diagnosis:    Codes     Aphasia as late effect of cerebrovascular accident (CVA)    -  Primary   Insurance/Certification information: Medicare A & B; [address]   Referring Physician:  Jim Johnson. Rasta Boyer MD  Plan of care signed (Y/N):  Y  Visit# / total visits:     Pain level: No reported or suspected pain noted     Progress Note: [x]  Yes  []  No  Next due by: Visit #10:      Subjective: The pt was seen for the first time since 4/15/21 due to the pt's schedule. He was accompanied by his sister-in-law Ju. Objective:   Short-term Goals:  Goal 1: NEW GOAL: The pt will say indivdual phrase and short sentences, in response to therapy tasks, with 90% accuracy, mod cues:  - 60% (6/10), mod cues; targeted via various topics     Goal 2: NEW GOAL: The pt will complete basic confrontational and responsive naming with 90% accuracy, min cues  - Confrontational: Did not target  - Responsive: 80% (8/10), min cues    Goal 3: The pt will use an AAC speech generating device to answer given basic questions with 80% accuracy, mod-max cues:  - Goal not targeted this date. The pt did not bring in his iPad this date to allow targeting of AAC panda. Goal 4: The pt will write single basic words with 50% accuracy, mod-max cues:  - Goal targeted indirectly through other treatment tasks. Goal 5:  The pt will complete basic confrontational and responsive naming with 75% accuracy, mod-max cues:  -  GOAL MET    Goal 6: The pt will say indivdual phrase and short sentences, in response to therapy tasks, with 70% accuracy, mod-max cues:  - GOAL MET      Assessment:   Significant improvements continue to be noted in the pt's verbal expression    Plan:   Continued Speech Therapy services 2 x week for 6 weeks. Total Treatment Time / Charges     Time in Time out Total Time / units   Cognitive Tx         Speech Tx 1400 1452 52 min / 1 unit   Dysphagia Tx           Signature:     Megan Larsen MA, [address] [facility]  [id number]  Speech-Language Pathologist  Phone #: [phone]  Fax #: [phone]

## 2021-04-28 NOTE — PROGRESS NOTES
[facility] Therapy, [facility] [address], [address]   Phone: [phone]  Fax [phone]    Physical Therapy Progress Note    Date:  2021    Patient Name:  Leonela Pederson    :  1955  MRN: [id number]  Restrictions/Precautions:    Medical/Treatment Diagnosis Information:  · Diagnosis: CVA, R hemiparesis  Insurance/Certification information:  PT Insurance Information: Medicare  Physician Information:  Referring Practitioner: Julianne Beebe MD  Plan of care signed (Y/N):  N  Visit# / total visits:       ________________________________________________________________________________________    Pain Level:    /10  SUBJECTIVE: Pt reports that he is no longer wearing the AFO. No other complaints. ASSESSMENT:    Pt has completed 8 visits of physical therapy and has shown good improvements with strength, ROM, movement mechanics and functional mobility. Pt no longer using AFO as his ankle DF activation has improved significantly. Pt does continue with altered gait sequence and significant decrease in hip, hamstring and quad strength. Pt would benefit from additional therapy at 2x/wk for 4 weeks to address remaining impairments.     Treatment/Activity Tolerance:   [x]Patient tolerated treatment well [] Patient limited by fatique  []Patient limited by pain [] Patient limited by other medical complications  [] Other:     Goals:    Short term goals  Time Frame for Short term goals: 6 weeks  Short term goal 1: pt will be indep in HEP - MET  Short term goal 2: pt will increase R DF strength by 1/3 muscle grade - progressing towards  Short term goal 3: pt will ambulate with increased glut activation RLE - NOT MET  Short term goal 4: pt will decrease TUG time 10 sec  Short term goal 5: pt will increase LEFS score by 10 points to hit MCID           Plan: [] Continue per plan of care [x] Alter current plan (see comments)   [] Plan of care initiated [] Hold pending MD visit [] Discharge           Signature:  Deejay Masrh PT                Physician Signature:_______________________________Date:__________________  By signing above (or electronic signature), therapists plan is approved by physician

## 2021-04-29 ENCOUNTER — HOSPITAL ENCOUNTER (OUTPATIENT)
Dept: PHYSICAL THERAPY | Age: 66
Setting detail: THERAPIES SERIES
Discharge: HOME | End: 2021-04-29
Payer: MEDICARE

## 2021-04-29 ENCOUNTER — HOSPITAL ENCOUNTER (OUTPATIENT)
Dept: SPEECH THERAPY | Age: 66
Setting detail: THERAPIES SERIES
Discharge: HOME | End: 2021-04-29
Payer: MEDICARE

## 2021-04-29 PROCEDURE — 97110 THERAPEUTIC EXERCISES: CPT

## 2021-04-29 PROCEDURE — 92507 TX SP LANG VOICE COMM INDIV: CPT

## 2021-04-29 PROCEDURE — 97112 NEUROMUSCULAR REEDUCATION: CPT

## 2021-04-29 NOTE — FLOWSHEET NOTE
[facility], [place] [address] [address]  [address] [place] [address], [address] [place] [address]   Phone: [phone]  Fax [phone]      Speech-Language Pathology  Daily Treatment Note    Date:  2021    Patient Name:  Kishan Mcwilliams    :  1955  MRN: [id number]  Restrictions/Precautions:  Limited communication ability due to significant Aphasia  Treatment Diagnosis:    Codes     Aphasia as late effect of cerebrovascular accident (CVA)    -  Primary   Insurance/Certification information: Medicare A & B; [address]   Referring Physician:  Luz Maria Burch. Cary Carter MD  Plan of care signed (Y/N):  Y  Visit# / total visits:  10/13   Pain level: No reported or suspected pain noted     Progress Note: []  Yes  [x]  No  Next due by: Visit #10: 1/10 or 21     Subjective: The pt was in overall good spirits. He still needs encouragement to not become frustrated when verbal expression difficulty is encountered. Objective:   Short-term Goals:  Goal 1: NEW GOAL: The pt will say indivdual phrase and short sentences, in response to therapy tasks, with 90% accuracy, mod cues:  - 70% (7/10), mod cues; targeted via open ended questions and picture descriptions    Goal 2: NEW GOAL: The pt will complete basic confrontational and responsive naming with 90% accuracy, min cues  - Confrontational: 60% (6/10), min cues  - Responsive: 80% (8/10), min cues    Goal 3: The pt will use an AAC speech generating device to answer given basic questions with 80% accuracy, mod-max cues:  - Goal not targeted this date. The pt did not bring in his iPad this date to allow targeting of AAC panda. Goal 4: The pt will write single basic words with 50% accuracy, mod-max cues:  - Goal targeted indirectly through other treatment tasks. Goal 5:  The pt will complete basic confrontational and responsive naming with 75% accuracy, mod-max cues:  -  GOAL MET    Goal 6: The pt will say indivdual phrase and short sentences, in response to therapy tasks, with 70% accuracy, mod-max cues:  - GOAL MET      Assessment:   Continued improved spoken verbal output with more spontaneous phrase productions this date. Plan:   Continued Speech Therapy services 2 x week for 6 weeks. Total Treatment Time / Charges     Time in Time out Total Time / units   Cognitive Tx         Speech Tx 0945 1030 45 min / 1 unit   Dysphagia Tx           Signature:     Adriano Wise MA, [address] [facility]  [id number]  Speech-Language Pathologist  Phone #: [phone]  Fax #: [phone]

## 2021-04-29 NOTE — FLOWSHEET NOTE
[facility]. and Therapy, [facility], [facility] [address], [address]   Phone: [phone]  Fax [phone]    Physical Therapy Daily Treatment Note    Date:  2021    Patient Name:  Vlad Kim    :  1955  MRN: [id number]  Restrictions/Precautions:    Medical/Treatment Diagnosis Information:  · Diagnosis: CVA, R hemiparesis  Insurance/Certification information:  PT Insurance Information: Medicare  Physician Information:  Referring Practitioner: Fabian Leslie MD  Plan of care signed (Y/N):  N  Visit# / total visits:       G-Code (if applicable):          LEFS     Medicare Cap (if applicable):  289 = total amount used, updated 2021    Time in:   9:15   Timed Treatment: 30  Total Treatment Time:  30  ________________________________________________________________________________________    Pain Level:    /10  SUBJECTIVE: Pt reports that he is doing well. Was unaware that his appointment was at 5. They thought it was 9:15. OBJECTIVE: [id number]    Exercise/Equipment Resistance/Repetitions Other comments   NuStep 5 min           Supine leg press  Supine hip flexion 3 min  3 min Manual resistance   Clams #1 2x10 BLE    Bridge    Single limb x15  x15    PHE    Bent knee x10  x10 RLE In side-lying this session   PKF 2x5 RLE In sidelying this session   Supine clams x15 yellow   Supine hip neuro re-ed 3 min RLE    LAQ x10 RLE With TB assist   Seated hamstring curls x10 With maxislide   Sit-to-stand training nv    Side-lying knee extension/flexion x10 RLE           Slant board    Seated QS With hamstring stretch   TG    Standing step over object             Other Therapeutic Activities:      Manual Treatments:         Modalities:      Test/Measurements:         ASSESSMENT:    Pt tolerated session well. Minimal glut and hamstring activation noted - tactile cueing assisted in muscle contraction.      Treatment/Activity Tolerance: [x]Patient tolerated treatment well [] Patient limited by fatique  []Patient limited by pain [] Patient limited by other medical complications  [] Other:     Goals:    Short term goals  Time Frame for Short term goals: 6 weeks  Short term goal 1: pt will be indep in HEP  Short term goal 2: pt will increase R DF strength by 1/3 muscle grade  Short term goal 3: pt will ambulate with increased glut activation RLE  Short term goal 4: pt will decrease TUG time 10 sec  Short term goal 5: pt will increase LEFS score by 10 points to hit MCID           Plan: [x] Continue per plan of care [] Alter current plan (see comments)   [] Plan of care initiated [] Hold pending MD visit [] Discharge      Plan for Next Session:      Re-Certification Due Date:         Signature:  Navin Molina PT

## 2021-05-04 ENCOUNTER — HOSPITAL ENCOUNTER (OUTPATIENT)
Dept: OCCUPATIONAL THERAPY | Age: 66
Setting detail: THERAPIES SERIES
Discharge: HOME | End: 2021-05-04
Payer: MEDICARE

## 2021-05-04 ENCOUNTER — HOSPITAL ENCOUNTER (OUTPATIENT)
Dept: SPEECH THERAPY | Age: 66
Setting detail: THERAPIES SERIES
Discharge: HOME | End: 2021-05-04
Payer: MEDICARE

## 2021-05-04 ENCOUNTER — HOSPITAL ENCOUNTER (OUTPATIENT)
Dept: PHYSICAL THERAPY | Age: 66
Setting detail: THERAPIES SERIES
Discharge: HOME | End: 2021-05-04
Payer: MEDICARE

## 2021-05-04 PROCEDURE — 97112 NEUROMUSCULAR REEDUCATION: CPT

## 2021-05-04 PROCEDURE — 97110 THERAPEUTIC EXERCISES: CPT

## 2021-05-04 PROCEDURE — 92507 TX SP LANG VOICE COMM INDIV: CPT

## 2021-05-04 PROCEDURE — 97535 SELF CARE MNGMENT TRAINING: CPT

## 2021-05-04 PROCEDURE — 97140 MANUAL THERAPY 1/> REGIONS: CPT

## 2021-05-04 NOTE — FLOWSHEET NOTE
[facility], [place] [address] [address]  [place], [address]   Phone: [phone]  Fax [phone]      Speech-Language Pathology  Daily Treatment Note    Date:  2021    Patient Name:  Josephine Brown    :  1955  MRN: [id number]  Restrictions/Precautions:  Limited communication ability due to significant Aphasia  Treatment Diagnosis:    Codes     Aphasia as late effect of cerebrovascular accident (CVA)    -  Primary   Insurance/Certification information: Medicare A & B; [address]   Referring Physician:  Colletta Mallet. Victoriano Owusu MD  Plan of care signed (Y/N):  Y  Visit# / total visits:     Pain level: No reported or suspected pain noted     Progress Note: []  Yes  [x]  No  Next due by: Visit #10: 2/10 or 21     Subjective: The pt was in overall good spirits. He continues to become easily frustrated during attempted verbal expression needing encouragement and re-direction. Objective:   Short-term Goals:  Goal 1: NEW GOAL: The pt will say indivdual phrase and short sentences, in response to therapy tasks, with 90% accuracy, mod cues:  - 60% (9/15), mod cues; targeted via open ended questions and picture descriptions    Goal 2: NEW GOAL: The pt will complete basic confrontational and responsive naming with 90% accuracy, min cues  - Confrontational: 70% (7/10), min cues  - Responsive: 90% (9/10), min cues    Goal 3: The pt will use an AAC speech generating device to answer given basic questions with 80% accuracy, mod-max cues:  - Goal not targeted this date. The pt did not bring in his iPad this date to allow targeting of AAC panda. Goal 4: The pt will write single basic words with 50% accuracy, mod-max cues:  - Goal targeted indirectly through other treatment tasks. Goal 5:  The pt will complete basic confrontational and responsive naming with 75% accuracy, mod-max cues:  -  GOAL MET    Goal 6: The pt will say indivdual phrase and short sentences, in response to therapy tasks, with 70% accuracy, mod-max cues:  - GOAL MET      Assessment:   Continued improved spoken verbal output with more spontaneous phrase productions this date. Plan:   Continued Speech Therapy services 2 x week for 6 weeks. Total Treatment Time / Charges     Time in Time out Total Time / units   Cognitive Tx         Speech Tx 1430 1515 45 min / 1 unit   Dysphagia Tx           Signature:     Modesta Botello MA, [address] [facility][id number]  Speech-Language Pathologist  Phone #: [phone]  Fax #: [phone]

## 2021-05-04 NOTE — FLOWSHEET NOTE
[facility]. [facility] Therapy, [facility], [address] [place] [address]   Phone: [phone]  Fax [phone]      Outpatient Occupational Therapy     [x] Daily Treatment Note   [] Progress Note   [] Discharge Note    Date:  5/4/2021    Patient Name:  Hien Christopher         YOB: 1955    Medical Diagnosis/ICD10[de-identified]   CVA/I63.9, Right hemiparesis, Right side neglect, Expressive aphasia  Treatment Diagnosis: Impaired self care status due to decreased functional use of RUE/RLE  Referring Physician: Dr. Norma Stover    Referral Date:  3/15/2021  Onset Date:  Nov 2019  Visits Allowed/Insurance/Certification Information:   Medicare A/B, [employer]  Restrictions/Precautions Right vic, aphasia    Plan of care sent to provider:    [x]Faxed (date: 4/6/2021  ) []Co-signature    (attempts: 1[x] 2 []3[])        Plan of care signed:    [x]Yes (date:4/7/21  )           []No       Progress Note covers period from (if applicable):    []NA    [x]From 3/25/21         To       5/3/21    Next Progress Note due:   6/1/2021    Visit# / total visits:  7/12    Functional Outcome Measure:   3/25/2021: QuickDASH: 42    Subjective: Pt arrived alone. Pt ambulating with SBQC. Pain level: denies    Plan for Next Session:  WBing  Arm skate/table top towel slides  PROM in supine    Self ROM    Objective:        Range of Motion     AROM               RIGHT  RIGHT   LEFT      Date 3/25/2021  5/4/21   3/25/2021     Shoulder:   flex x  x   WNL                        ABD ~75o 70-75o    WNL     Elbow:      ext/flex x See comments    WNL     Forearm:  sup/pron x  x   WNL     Wrist:       ext/flex x  x   WNL     Digits: x  x   WNL        Comments:   PROM of RUE WFL with increased time d/t flexor tone    Demo active shrug and scap squeeze on right with verbal and tactile cues  Active right shld abduction with compensation at upper trap.    Demo contraction with attempts at active elbow shoes    Per PT, pt no longer requires AFO 4/15/21     Functional Mobility Pt ambulating with SBQC, w/o AFO - with mod ind  OT<>bathroom  OT>parking lot []    ADLs Pt performed toileting tasks with modified indepedence []        []       NEUROMUSCULAR RE-EDU and THERAPEUTIC ACTIVITY        Education on stroke recovery Role of OT  Tone and management of tone  Typical progression of UE function after stoke   [] Pt's sister-in-law verbalized understanding   Pt will benefit from reinforcement d/t communication deficits. WBing RUE, fully ext  EOM  6 min    Weight shifting toward right for increased proprioceptive input [] Denies pain with WBing   Right shoulder movement Use of inflatable arm splint to isolate shoulder muscles for facilitating normal movement patterns and reduce compensation    EOM - use of table top at shld ht  Active:Horizontal adduction  Active assisted: Horizontal abduction    Supine - Active assisted:   shld flex , 10x  shld ext, 10x  Elbow flex, 10x  Elbow ext, 5x           Noted pt initiating movement, even against gravity. Increased effort required - cues for breathing    Demo flex synergy, clemente with attempts at active elbow ext   Card sorting Unable to follow commands for sorting by suit. Able to sort by color with min cues/explaination  Sorting entire deck with only 1 error  Completed while WBing to facilitate weight shift to right           Arm skate Seated EOM, using rolling/hieght adjustable table    Pushing cones of edge of table (16x)    Demo active movement for: (flexor synergy)  Horizontal adduction, elbow flex and shld ext    Active assist for:  Horizontal abduction, elbow ext, shld flex [] Increased effort required - cues for breathing        THERAPEUTIC EXERCISE and MANUAL TECHNIQUES     AAROM  Attempted active assisted shld flexion    Pt demo shld abduction, elbow/wrist flex with attempts at active movement.        PROM RUE  - Supine    Shld flexion to ~90o  Shld abduction to ~90o  ER to ~10-15o  Elbow ext  Wrist ext to neutral with increased effort  Digit ext with increased effort   [] Pt's sister in law trained on PROM techniques for shld flex, shld abd and elbow ext. She returned demo and verbalized understanding. Emailed program via [address] [place] [address]. Self ROM shld flexion while supine  Instructed to avoid flex >90  Pt returned demo [] Reviewed for HEP     Scap mobs  - right EOM - All direction    Supine - scap adduction []       MODALITIES         []        []       SPLINTING       RHS Pt reports pre-bettina resting hand splint at home as recommended by home OT. Pt reports difficult to don independently []      Home Exercise Program: PROM by caregiver    Treatment/Activity Tolerance:    Patients response to treatment:   [x]Patient tolerated treatment well []Patient limited by fatigue   []Patient limited by pain  []Patient limited by other medical complications   []Other:     Assessment:   Progress note completed this date. Pt demo fair progress toward OT goals as Pt met 2/5 STG and 1/4 LTG. Pt will cont to benefit from outpatient occupational therapy to address decreased functional use of RUE to facilitate improved active ROM and education on techniques to incorporate RUE into 2 handed tasks as gross assist. Recommend cont OP OT 2x/wk for 4 weeks. See updated OT poc below.        GOALS:  Patient goal: \" Get right arm working. Move back into home. \"     Short Term Goals:  to be met in 2 weeks (by 5/18/2021)     Pt will demonstrate active assisted right shoulder flexion to 50 degrees or better, gravity reduced, while rating pain < 2/10 for improved ROM for functional task performance. - Progressing, CONT GOAL, 5/4/21     Pt will demonstrate 15o degrees of active assisted elbow flexion against gravity, while rating pain < 0/10 for improved ROM for functional task performance.  - Progressing, CONT GOAL, 5/4/21     Pt and caregiver will independently verbalize 2 techniques to obtain minutes           Electronically signed by:  Cristi Krause, OTR/L

## 2021-05-04 NOTE — FLOWSHEET NOTE
[facility], [facility], [facility] [address]  Phone: [phone]  Fax [phone]    Physical Therapy Daily Treatment Note    Date:  2021    Patient Name:  Wm Bellamy    :  1955  MRN: [id number]  Restrictions/Precautions:    Medical/Treatment Diagnosis Information:  · Diagnosis: CVA, R hemiparesis  Insurance/Certification information:  PT Insurance Information: Medicare  Physician Information:  Referring Practitioner: Jason Murguia MD  Plan of care signed (Y/N):  N  Visit# / total visits:  10/12     G-Code (if applicable):          LEFS     Medicare Cap (if applicable):  136 = total amount used, updated 2021    Time in:   12:45   Timed Treatment: 45  Total Treatment Time:  45  ________________________________________________________________________________________    Pain Level:    /10  SUBJECTIVE: Pt reports that he is doing well. Reports that he does feel like he is gaining some strength. OBJECTIVE: [id number]    Exercise/Equipment Resistance/Repetitions Other comments   NuStep 5 min           Supine leg press  Supine hip flexion 3 min  3 min Manual resistance   Clams #1 2x10 BLE    Bridge    Single limb x15  x15    PHE    Bent knee x10  x10 RLE In side-lying this session   PKF 2x5 RLE In sidelying this session   Supine clams x15 yellow   Supine hip neuro re-ed 3 min RLE    LAQ x10 RLE With TB assist   Seated hamstring curls x10 With maxislide   Sit-to-stand training nv    Side-lying knee extension/flexion x10 RLE    Hooklying ADD ball squeeze    With hip flexion 10x5\" hold  x10           Slant board 3x30\"    Seated QS 10x5\" RLE With hamstring stretch   TG 5 min    Standing step over object 3 min RLE             Other Therapeutic Activities:      Manual Treatments:         Modalities:      Test/Measurements:         ASSESSMENT:    Pt tolerated session well.  Minimal glut and hamstring activation noted - tactile cueing assisted in muscle contraction.      Treatment/Activity Tolerance:   [x]Patient tolerated treatment well [] Patient limited by fatique  []Patient limited by pain [] Patient limited by other medical complications  [] Other:     Goals:    Short term goals  Time Frame for Short term goals: 6 weeks  Short term goal 1: pt will be indep in HEP  Short term goal 2: pt will increase R DF strength by 1/3 muscle grade  Short term goal 3: pt will ambulate with increased glut activation RLE  Short term goal 4: pt will decrease TUG time 10 sec  Short term goal 5: pt will increase LEFS score by 10 points to hit MCID           Plan: [x] Continue per plan of care [] Alter current plan (see comments)   [] Plan of care initiated [] Hold pending MD visit [] Discharge      Plan for Next Session:      Re-Certification Due Date:         Signature:  Lynsey Hewitt , PT

## 2021-05-04 NOTE — FLOWSHEET NOTE
[facility], [facility], [address], [address]  Phone: [phone]  Fax [phone]      Jose Marx  Dear Dr. Forde Seip     The following patient has been assessed for therapy services. Please review the attached summary of the patient's plan of care, and verify that you agree with plan for additional therapy services at this time. Plan of Care/Treatment to date:  [x] Therapeutic Exercise  [x]  Modalities:  [x] Therapeutic Activity   [] Ultrasound [x] Electrical Stimulation   [] Total Motion Release   [] Fluidotherapy [x] Kinesiotaping  [x]  Neuromuscular Re-education  [] Iontophoresis [x] Coldpack/hotpack   [x]  Instruction in HEP    Other:  [x]  Manual Therapy     []   Dry needling             [x] ADL/Self Care  [x] IADL Training  [] LSVT BIG  [] Saebo  [x] Splinting  [] Wheelchair Mobility                       Frequency/Duration:  # Days per week: [] 1 day # Weeks: [] 1 week [] 5 weeks      [x] 2 days   [] 2 weeks [] 6 weeks     [] 3 days   [] 3 weeks [] 7 weeks     [] 4 days   [x] 4 weeks [] 8 weeks     [] 5 days   [] 10 weeks [] 12 weeks    Rehab Potential: [] Excellent [x] Good [] Fair  [] Poor     Thank you for the referral of this patient. Please sign.      Physician signature_______________________ Date________________  By signing above, therapists plan is approved by physician     Fax to: [facility] [phone]     Electronically signed by:  Brii Hernandez        Outpatient Occupational Therapy     [x] Daily Treatment Note   [x] Progress Note   [] Discharge Note    Date:  5/4/2021    Patient Name:  Cephas Shone         YOB: 1955    Medical Diagnosis/ICD10[de-identified]   CVA/I63.9, Right hemiparesis, Right side neglect, Expressive aphasia  Treatment Diagnosis: Impaired self care status due to decreased functional use of RUE/RLE  Referring Physician:  Trice    Referral Date:  3/15/2021  Onset Date:  Nov 2019  Visits Allowed/Insurance/Certification Information:   Medicare A/B, [employer]  Restrictions/Precautions Right vic, aphasia    Plan of care sent to provider:    [x]Faxed (date: 4/6/2021  ) []Co-signature    (attempts: 1[x] 2 []3[])        Plan of care signed:    [x]Yes (date:4/7/21  )           []No       Progress Note covers period from (if applicable):    []NA    [x]From 3/25/21         To       5/3/21    Next Progress Note due:   6/1/2021    Visit# / total visits:  7/12    Functional Outcome Measure:   3/25/2021: QuickDASH: 42    Subjective: Pt arrived alone. Pt ambulating with SBQC. Pain level: denies    Objective:        Range of Motion     AROM               RIGHT  RIGHT   LEFT      Date 3/25/2021  5/4/21   3/25/2021     Shoulder:   flex x  x   WNL                        ABD ~75o 70-75o    WNL     Elbow:      ext/flex x See comments    WNL     Forearm:  sup/pron x  x   WNL     Wrist:       ext/flex x  x   WNL     Digits: x  x   WNL        Comments:   PROM of RUE WFL with increased time d/t flexor tone    Demo active shrug and scap squeeze on right with verbal and tactile cues  Active right shld abduction with compensation at upper trap. Demo contraction with attempts at active elbow flex/ext. Requires active assist to move ~15o in either direction. No active movement noted in wrist/digits.    No sublux noted in right [address] joint.      Strength  Muscle  (*denotes pain) Right     Left   Comments       DATE 3/25/2021 5/4/21    3/25/2021       Shoulder Flexion  1  ?   WNL       Shoulder Abduction  2+ 3-    WNL       Elbow Flexion 1?  1   WNL       Elbow Extension 0  1   WNL       Pronation 0  0   WNL       Supination 0  0   WNL       Wrist Flexion 0  0   WNL       Wrist Extension 0  0   WNL             Assessment of UE tone/spasticity:    Date: 3/25/2021 5/4/21        Shoulder flexors 2 1        Internal rotators 1  1+       External rotators 0  0     Elbow flexors 1+  (clonus)  1  (at end range)       Elbow extensor 1+ 1+        Supinators 0  0       Pronators 1+  1       Wrist flexors 2  2       Wrist extensors 0 0        Digit flexors 2-3  2       Digit extensors 0  0             Modified Rian Scale  0    No increase in muscle tone  1    Slight increase in muscle tone, manifested by a catch and release or by minimal        resistance at the end of the range of motion when the affected part(s) is moved in        flexion or extension  1+ Slight increase in muscle tone, manifested by a catch, followed by minimal        resistance throughout the remainder (less than half) of the ROM  2    More marked increase in muscle tone through most of the ROM, but        affected part(s) easily moved  3    Considerable increase in muscle tone, passive movement difficult  4    Affected part(s) rigid in flexion or extension      Home Exercise Program: PROM by caregiver; self ROM    Treatment/Activity Tolerance:    Patients response to treatment:   [x]Patient tolerated treatment well []Patient limited by fatigue   []Patient limited by pain  []Patient limited by other medical complications   []Other:     Assessment:   Progress note completed this date. Pt demo fair progress toward OT goals as Pt met 2/5 STG and 1/4 LTG. Pt will cont to benefit from outpatient occupational therapy to address decreased functional use of RUE to facilitate improved active ROM and education on techniques to incorporate RUE into 2 handed tasks as gross assist. Recommend cont OP OT 2x/wk for 4 weeks. See updated OT poc below.        GOALS:  Patient goal: \" Get right arm working. Move back into home. \"     Short Term Goals:  to be met in 2 weeks (by 5/18/2021)     Pt will demonstrate active assisted right shoulder flexion to 50 degrees or better, gravity reduced, while rating pain < 2/10 for improved ROM for functional task performance.  - Progressing, CONT GOAL, 5/4/21     Pt will demonstrate 15o degrees of active assisted elbow flexion against gravity, while rating pain < 0/10 for improved ROM for functional task performance. - Progressing, CONT GOAL, 5/4/21     Pt and caregiver will independently verbalize 2 techniques to obtain optimal positioning of on right vic arm to ensure approximation of glenohumeral joint and decrease risk of complications from subluxation. - CONT GOAL 5/4/21     Pt will participate in cont assessment of visual perceptual status by completing visual tracking and visual field test. . - GOAL MET, 4/6/21, no apparent visual deficits     Pt will report consistent compliance with HEP at least 4x/wk - GOAL MET 5/4/21        Long Term Goals:  to be met in 4 weeks (by 6/1/2021)     Pt will demonstrate active assisted right shoulder flexion to 50 degrees, against gravity, while rating pain < 2/10 for improved ROM for functional task performance. - Progressing, CONT GOAL, 5/4/21     Pt will demonstrate 25o degrees of right active elbow flexion,against gravity, while rating pain < 0/10 for improved ROM for functional task performance. - Progressing, CONT GOAL, 5/4/21     Pt will demonstrate 20o degrees of right active elbow extension while rating pain < 0/10 for improved ROM for functional task performance. - Progressing, CONT GOAL, 5/4/21     Pt will tolerate WBing for 5 min through RUE with elbow and wrist extension for tone management in prep for active assisted movement.  - GOAL MET, 5/4/21  Upgrade to 10 min         Prognosis: [x]Good   []Fair   []Poor    Patient Requires Follow-up:  [x]Yes  []No    Plan: []Plan of care initiated     [x]Continue per plan of care    [] Alter current plan (see comments)    []Hold pending MD visit []Discharge      Time in:          1345    Time Out:   1430     Timed Code Treatment Minutes:   45 minutes        Total Treatment Time:  45 minutes      Electronically signed by:  QI Burgess/BETINA

## 2021-05-06 ENCOUNTER — HOSPITAL ENCOUNTER (OUTPATIENT)
Dept: PHYSICAL THERAPY | Age: 66
Setting detail: THERAPIES SERIES
Discharge: HOME | End: 2021-05-06
Payer: MEDICARE

## 2021-05-06 ENCOUNTER — HOSPITAL ENCOUNTER (OUTPATIENT)
Dept: SPEECH THERAPY | Age: 66
Setting detail: THERAPIES SERIES
Discharge: HOME | End: 2021-05-06
Payer: MEDICARE

## 2021-05-06 ENCOUNTER — HOSPITAL ENCOUNTER (OUTPATIENT)
Dept: OCCUPATIONAL THERAPY | Age: 66
Setting detail: THERAPIES SERIES
Discharge: HOME | End: 2021-05-06
Payer: MEDICARE

## 2021-05-06 PROCEDURE — 97110 THERAPEUTIC EXERCISES: CPT

## 2021-05-06 PROCEDURE — 97112 NEUROMUSCULAR REEDUCATION: CPT

## 2021-05-06 PROCEDURE — 97530 THERAPEUTIC ACTIVITIES: CPT

## 2021-05-06 PROCEDURE — 92507 TX SP LANG VOICE COMM INDIV: CPT

## 2021-05-06 PROCEDURE — 97140 MANUAL THERAPY 1/> REGIONS: CPT

## 2021-05-06 NOTE — FLOWSHEET NOTE
[address] [place] [address] and [address]  Phone: [phone]  Fax [phone]    Physical Therapy Daily Treatment Note    Date:  2021    Patient Name:  Melissa Posada    :  1955  MRN: [id number]  Restrictions/Precautions:    Medical/Treatment Diagnosis Information:  · Diagnosis: CVA, R hemiparesis  Insurance/Certification information:  PT Insurance Information: Medicare  Physician Information:  Referring Practitioner: Jemal Peres MD  Plan of care signed (Y/N):  N  Visit# / total visits:       G-Code (if applicable):          LEFS     Medicare Cap (if applicable):  165 = total amount used, updated 2021    Time in:   12:45   Timed Treatment: 45  Total Treatment Time:  45  ________________________________________________________________________________________    Pain Level:    /10  SUBJECTIVE: Pt reports that he is doing well. Reports that he does feel like he is gaining some strength. OBJECTIVE: [id number]    Exercise/Equipment Resistance/Repetitions Other comments   NuStep 5 min           Supine leg press  Supine hip flexion 3 min  3 min Manual resistance   Clams #1 2x10 BLE    Bridge    Single limb x15  x15    PHE    Bent knee x10  x10 RLE In side-lying this session   PKF 2x5 RLE In sidelying this session   Supine clams x15 yellow   Supine hip neuro re-ed 3 min RLE    LAQ x10 RLE With TB assist   Seated hamstring curls x10 With maxislide        Side-lying knee extension/flexion x10 RLE    Hooklying ADD ball squeeze    With hip flexion 10x5\" hold  x10           Slant board 3x30\"    Seated QS 10x5\" RLE With hamstring stretch   TG 5 min    Standing step over object 3 min BLE    Minisquat x10 cueing     Other Therapeutic Activities:      Manual Treatments:         Modalities:      Test/Measurements:         ASSESSMENT:    Pt tolerated session well.  Pt required moderate cueing for glut activation during minisquat. Improving with standing exercise.      Treatment/Activity Tolerance:   [x]Patient tolerated treatment well [] Patient limited by fatique  []Patient limited by pain [] Patient limited by other medical complications  [] Other:     Goals:    Short term goals  Time Frame for Short term goals: 6 weeks  Short term goal 1: pt will be indep in HEP  Short term goal 2: pt will increase R DF strength by 1/3 muscle grade  Short term goal 3: pt will ambulate with increased glut activation RLE  Short term goal 4: pt will decrease TUG time 10 sec  Short term goal 5: pt will increase LEFS score by 10 points to hit MCID           Plan: [x] Continue per plan of care [] Alter current plan (see comments)   [] Plan of care initiated [] Hold pending MD visit [] Discharge      Plan for Next Session:      Re-Certification Due Date:         Signature:  Marlo Acosta PT

## 2021-05-06 NOTE — FLOWSHEET NOTE
[facility], [place] [address]  [address] [place] [address], [address] [place] [address]  Phone: [phone]  Fax [phone]      Speech-Language Pathology  Daily Treatment Note    Date:  2021    Patient Name:  Wm Bellamy    :  1955  MRN: [id number]  Restrictions/Precautions:  Limited communication ability due to significant Aphasia  Treatment Diagnosis:    Codes     Aphasia as late effect of cerebrovascular accident (CVA)    -  Primary   Insurance/Certification information: Medicare A & B; [facility]   Referring Physician:  Juan Posey. Jesica Salgado MD  Plan of care signed (Y/N):  Y  Visit# / total visits:     Pain level: No reported or suspected pain noted     Progress Note: []  Yes  [x]  No  Next due by: Visit #10: 3/10 or 21     Subjective: The pt was pleasant and cooperative. He needs ongoing reminders to stay calm and to not get overall frustrated during verbal expression. Objective:   Short-term Goals:  Goal 1: NEW GOAL: The pt will say indivdual phrase and short sentences, in response to therapy tasks, with 90% accuracy, mod cues:  - 67% (10/15), mod cues; targeted via picture descriptions    Goal 2: NEW GOAL: The pt will complete basic confrontational and responsive naming with 90% accuracy, min cues  - Confrontational: 60% (6/10), min cues  - Responsive: 70% (7/10), min cues    Goal 3: The pt will use an AAC speech generating device to answer given basic questions with 80% accuracy, mod-max cues:  - Goal not targeted this date. The pt did not bring in his iPad this date to allow targeting of AAC panda. Goal 4: The pt will write single basic words with 50% accuracy, mod-max cues:  - 0% (0/4), max cues; The pt continues to struggle with this. He also struggles with letter identification; He was able to copy some basic words  WILL D/C GOAL IN THE NEAR FUTURE IF LIMITED PROGRESS PERSISTS TOWARD THIS GOAL. Goal 5:  The pt will complete basic confrontational and responsive naming with 75% accuracy, mod-max cues:  -  GOAL MET    Goal 6: The pt will say indivdual phrase and short sentences, in response to therapy tasks, with 70% accuracy, mod-max cues:  - GOAL MET      Assessment:   Continued improved spoken verbal output with more spontaneous phrase productions this date. Plan:   Continued Speech Therapy services 2 x week for 6 weeks. Total Treatment Time / Charges     Time in Time out Total Time / units   Cognitive Tx         Speech Tx 1445 1530 45 min / 1 unit   Dysphagia Tx           Signature:     Susanne Ramsay MA, Alessio Montana  [id number]  Speech-Language Pathologist  Phone #: [phone]  Fax #: [phone]

## 2021-05-06 NOTE — FLOWSHEET NOTE
modified indepedence []        []       NEUROMUSCULAR RE-EDU and THERAPEUTIC ACTIVITY        Education on stroke recovery Role of OT  Tone and management of tone  Typical progression of UE function after stoke   [] Pt's sister-in-law verbalized understanding   Pt will benefit from reinforcement d/t communication deficits. WBing RUE, fully ext  EOM  24 min    Weight shifting toward right for increased proprioceptive input [] Denies pain with WBing   Right shoulder movement Scap squeeze, 5x  shld shrug, 5x    Completed in front of mirror for visual feed back  Contraction noted with shrug, slight movement noted  Contraction and movement noted with squeeze      Use of inflatable arm splint to isolate shoulder muscles for facilitating normal movement patterns and reduce compensation    EOM - use of table top at shld ht  Active:Horizontal adduction  Active assisted: Horizontal abduction    Supine - Active assisted:   shld flex , 10x  shld ext, 10x  Elbow flex, 10x  Elbow ext, 5x   Added scap squeeze and shld shrug to HEP - recommended in front of mirror    Noted pt initiating movement, even against gravity. Increased effort required - cues for breathing         Demo flex synergy, clemente with attempts at active elbow ext   Card sorting Unable to follow commands for sorting by suit. Able to sort by color with min cues/explaination  Sorting entire deck with only 1 error  Completed while WBing to facilitate weight shift to right   Mirror Therapy Educated pt on purpose of and research re: mirror therapy    Pt participated in mirror therapy for 10 min  Completing exercises with left hand while watching reflection  2-3 cues to cont to watch reflexion  Pt  Will benefit from cont education d/t comprehension may be limited by aphasia.       Arm skate Seated EOM, using rolling/hieght adjustable table    Pushing cones of edge of table (16x)    Demo active movement for: (flexor synergy)  Horizontal adduction, elbow flex and shld ext    Active assist for:  Horizontal abduction, elbow ext, shld flex [] Increased effort required - cues for breathing        THERAPEUTIC EXERCISE and MANUAL TECHNIQUES     AAROM  Attempted active assisted shld flexion    Pt demo shld abduction, elbow/wrist flex with attempts at active movement. PROM RUE  - Supine    Shld flexion to ~90o  Shld abduction to ~90o  ER to ~10-15o  Elbow ext  Wrist ext to neutral with increased effort  Digit ext with increased effort   [] Pt's sister in law trained on PROM techniques for shld flex, shld abd and elbow ext. She returned demo and verbalized understanding. Emailed program via [address] [place] [address]. Self ROM shld flexion while supine  Instructed to avoid flex >90  Pt returned demo [] Reviewed for HEP     Scap mobs  - right EOM - All direction    Supine - scap adduction []       MODALITIES         []        []       SPLINTING       RHS Pt reports pre-bettina resting hand splint at home as recommended by home OT. Pt reports difficult to don independently []      Home Exercise Program: PROM by caregiver, self ROM, Shoulder/scap exercises with visual feedback    Treatment/Activity Tolerance:    Patients response to treatment:   [x]Patient tolerated treatment well []Patient limited by fatigue   []Patient limited by pain  []Patient limited by other medical complications   []Other:     Assessment:   Pt tolerating WBing for 24 min this date. Demo reduction in flexor tone after WBing. Demo contraction and emerging movement with active scap squeeze and shld shrug. Cont per OT poc.        GOALS:  Patient goal: \" Get right arm working. Move back into home. \"     Short Term Goals:  to be met in 2 weeks (by 5/18/2021)     Pt will demonstrate active assisted right shoulder flexion to 50 degrees or better, gravity reduced, while rating pain < 2/10 for improved ROM for functional task performance.  - Progressing, CONT GOAL, 5/4/21     Pt will demonstrate 15o degrees of active assisted elbow flexion against gravity, while rating pain < 0/10 for improved ROM for functional task performance. - Progressing, CONT GOAL, 5/4/21     Pt and caregiver will independently verbalize 2 techniques to obtain optimal positioning of on right vic arm to ensure approximation of glenohumeral joint and decrease risk of complications from subluxation. - CONT GOAL 5/4/21     Pt will participate in cont assessment of visual perceptual status by completing visual tracking and visual field test. . - GOAL MET, 4/6/21, no apparent visual deficits     Pt will report consistent compliance with HEP at least 4x/wk - GOAL MET 5/4/21        Long Term Goals:  to be met in 4 weeks (by 6/1/2021)     Pt will demonstrate active assisted right shoulder flexion to 50 degrees, against gravity, while rating pain < 2/10 for improved ROM for functional task performance. - Progressing, CONT GOAL, 5/4/21     Pt will demonstrate 25o degrees of right active elbow flexion,against gravity, while rating pain < 0/10 for improved ROM for functional task performance. - Progressing, CONT GOAL, 5/4/21     Pt will demonstrate 20o degrees of right active elbow extension while rating pain < 0/10 for improved ROM for functional task performance. - Progressing, CONT GOAL, 5/4/21     Pt will tolerate WBing for 5 min through RUE with elbow and wrist extension for tone management in prep for active assisted movement.  - GOAL MET, 5/4/21  Upgrade to 10 min         Prognosis: [x]Good   []Fair   []Poor    Patient Requires Follow-up:  [x]Yes  []No    Plan: []Plan of care initiated     [x]Continue per plan of care    [] Alter current plan (see comments)    []Hold pending MD visit []Discharge      ---  ---- --- ---- --- ---- --- ---- --- ---- --- ---- --- ---- --- ---- --- ---- --- --- ---    Therapeutic Exercise/Home Exercise Program:    minutes    Therapeutic Activity:  15 minutes    ADL Training:   minutes      Neuromuscular Re-Education:  45 minutes      Manual

## 2021-05-11 ENCOUNTER — HOSPITAL ENCOUNTER (OUTPATIENT)
Dept: OCCUPATIONAL THERAPY | Age: 66
Setting detail: THERAPIES SERIES
Discharge: HOME | End: 2021-05-11
Payer: MEDICARE

## 2021-05-11 ENCOUNTER — HOSPITAL ENCOUNTER (OUTPATIENT)
Dept: PHYSICAL THERAPY | Age: 66
Setting detail: THERAPIES SERIES
Discharge: HOME | End: 2021-05-11
Payer: MEDICARE

## 2021-05-11 ENCOUNTER — HOSPITAL ENCOUNTER (OUTPATIENT)
Dept: SPEECH THERAPY | Age: 66
Setting detail: THERAPIES SERIES
Discharge: HOME | End: 2021-05-11
Payer: MEDICARE

## 2021-05-11 PROCEDURE — 97112 NEUROMUSCULAR REEDUCATION: CPT

## 2021-05-11 PROCEDURE — 92507 TX SP LANG VOICE COMM INDIV: CPT

## 2021-05-11 PROCEDURE — 97140 MANUAL THERAPY 1/> REGIONS: CPT

## 2021-05-11 PROCEDURE — 97110 THERAPEUTIC EXERCISES: CPT

## 2021-05-11 NOTE — FLOWSHEET NOTE
[facility], [facility], [address]  Phone: [phone]  Fax [phone]    Physical Therapy Daily Treatment Note    Date:  2021    Patient Name:  Silvia Denny    :  1955  MRN: [id number]  Restrictions/Precautions:    Medical/Treatment Diagnosis Information:  · Diagnosis: CVA, R hemiparesis  Insurance/Certification information:  PT Insurance Information: Medicare  Physician Information:  Referring Practitioner: Skye Celeste MD  Plan of care signed (Y/N):  N  Visit# / total visits:       G-Code (if applicable):          LEFS     Medicare Cap (if applicable):  3246 = total amount used, updated 2021    Time in:   9:00   Timed Treatment: 45  Total Treatment Time:  45  ________________________________________________________________________________________    Pain Level:    /10  SUBJECTIVE: Pt reports that he is doing well. Nothing new to report. OBJECTIVE: [id number]    Exercise/Equipment Resistance/Repetitions Other comments   NuStep 5 min           Supine leg press  Supine hip flexion 3 min  3 min Manual resistance   Clams #1 2x10 BLE    Bridge    Single limb x15  x15    PHE    Bent knee x10  x10 RLE In side-lying this session   PKF 2x5 RLE In sidelying this session   Supine clams x15 yellow   Supine hip neuro re-ed 3 min RLE    LAQ x10 RLE With TB assist   Seated hamstring curls x10 With maxislide        Side-lying knee extension/flexion x10 RLE    Hooklying ADD ball squeeze    With hip flexion   x10           Slant board 3x30\"    Seated QS 10x5\" RLE With hamstring stretch   TG 5 min    Standing step over object 3 min BLE    Minisquat x10 cueing     Other Therapeutic Activities:      Manual Treatments:         Modalities:      Test/Measurements:         ASSESSMENT:    Pt tolerated session well. Pt required moderate cueing for glut activation during minisquat. Improving with standing exercise. Treatment/Activity Tolerance:   [x]Patient tolerated treatment well [] Patient limited by fatique  []Patient limited by pain [] Patient limited by other medical complications  [] Other:     Goals:    Short term goals  Time Frame for Short term goals: 6 weeks  Short term goal 1: pt will be indep in HEP  Short term goal 2: pt will increase R DF strength by 1/3 muscle grade  Short term goal 3: pt will ambulate with increased glut activation RLE  Short term goal 4: pt will decrease TUG time 10 sec  Short term goal 5: pt will increase LEFS score by 10 points to hit MCID           Plan: [x] Continue per plan of care [] Alter current plan (see comments)   [] Plan of care initiated [] Hold pending MD visit [] Discharge      Plan for Next Session:      Re-Certification Due Date:         Signature:  Ju Andrew PT

## 2021-05-11 NOTE — FLOWSHEET NOTE
[facility], [facility], [address] [facility], [address]   Phone: [phone]  Fax [phone]      Outpatient Occupational Therapy     [x] Daily Treatment Note   [] Progress Note   [] Discharge Note    Date:  5/11/2021    Patient Name:  Josephine Brown         YOB: 1955    Medical Diagnosis/ICD10[de-identified]   CVA/I63.9, Right hemiparesis, Right side neglect, Expressive aphasia  Treatment Diagnosis: Impaired self care status due to decreased functional use of RUE/RLE  Referring Physician: Dr. Shasta King    Referral Date:  3/15/2021  Onset Date:  Nov 2019  Visits Allowed/Insurance/Certification Information:   Medicare A/B, AARP  Restrictions/Precautions Right vic, aphasia    Plan of care sent to provider:    [x]Faxed (date:   ) []Co-signature    (attempts: 1[x] 2 []3[])        Plan of care signed:    [x]Yes (date:5/5/21  )           []No       Progress Note covers period from (if applicable):    [x]NA    []From 5/6/21         To           Next Progress Note due:   6/1/2021    Visit# / total visits:  2/8; 7/12    Functional Outcome Measure:   3/25/2021: QuickDASH: 42    Subjective: Pt arrived alone. Pt ambulating with SBQC. Pain level: denies    Plan for Next Session:  WBing  NMES - elbow ext  PROM in supine    Self ROM  Mirror therapy    Objective:       Exercises:    Exercises in bold performed in department today. Items not bolded are carried forward from prior visits for continuity of the record.   Exercise/Equipment Resistance/Repetitions HEP Other comments      ADL/IADL TRAINING       LB dressing Doff/don right shoe and AFO with modified independence as pt required increased time    [] Increased effort this date d/t new shoes    Per PT, pt no longer requires AFO 4/15/21     Functional Mobility Pt ambulating with SBQC, w/o AFO - with mod ind  OT<>bathroom  OT>parking lot []    ADLs Pt performed toileting tasks with modified indepedence []        []       NEUROMUSCULAR RE-EDU and THERAPEUTIC ACTIVITY        Education on stroke recovery Role of OT  Tone and management of tone  Typical progression of UE function after stoke   [] Pt's sister-in-law verbalized understanding   Pt will benefit from reinforcement d/t communication deficits. WBing RUE, fully ext  EOM  6 min    Weight shifting toward right for increased proprioceptive input [] Denies pain with WBing   Right shoulder movement Scap squeeze, 3x  shld shrug, 3x    Completed in front of mirror for visual feed back  Contraction noted with shrug, slight movement noted  Contraction and movement noted with squeeze      Use of inflatable arm splint to isolate shoulder muscles for facilitating normal movement patterns and reduce compensation    EOM - use of table top at shld ht  Active:Horizontal adduction  Active assisted: Horizontal abduction    Supine - Active assisted:   shld flex , 10x  shld ext, 10x  Elbow flex, 5x  Elbow ext, 5x   Reviewed scap squeeze and shld shrug to HEP - recommended in front of mirror    Noted pt initiating movement, even against gravity. Increased effort required - cues for breathing     Demo flex synergy, clemente with attempts at active elbow flex    Noted contraction at right tricep with elbow ext   Card sorting Unable to follow commands for sorting by suit. Able to sort by color with min cues/explaination  Sorting entire deck with only 1 error  Completed while WBing to facilitate weight shift to right   Mirror Therapy Educated pt on purpose of and research re: mirror therapy    Pt participated in mirror therapy for 10 min  Completing exercises with left hand while watching reflection  2-3 cues to cont to watch reflexion  Pt  Will benefit from cont education d/t comprehension may be limited by aphasia.       Arm skate Seated EOM, using rolling/hieght adjustable table    Pushing cones of edge of table (16x)    Demo active movement for: (flexor synergy)  Horizontal adduction, elbow flex and shld ext    Active assist for:  Horizontal abduction, elbow ext, shld flex [] Increased effort required - cues for breathing        THERAPEUTIC EXERCISE and MANUAL TECHNIQUES     AAROM  Attempted active assisted shld flexion    Pt demo shld abduction, elbow/wrist flex with attempts at active movement. PROM RUE  - Supine    Shld flexion to ~90o  Shld abduction to ~90o  ER to ~10-15o  Elbow ext  Wrist ext to neutral with increased effort  Digit ext with increased effort   [] Pt's sister in law trained on PROM techniques for shld flex, shld abd and elbow ext. She returned demo and verbalized understanding. Emailed program via [employer]. Self ROM shld flexion while supine  Instructed to avoid flex >90  Pt returned demo [] Reviewed for HEP     Scap mobs  - right EOM - All direction    Supine - scap adduction []       MODALITIES         []        []       SPLINTING       RHS Pt reports pre-bettina resting hand splint at home as recommended by home OT. Pt reports difficult to don independently []      Home Exercise Program: PROM by caregiver, self ROM, Shoulder/scap exercises with visual feedback    Treatment/Activity Tolerance:    Patients response to treatment:   [x]Patient tolerated treatment well []Patient limited by fatigue   []Patient limited by pain  []Patient limited by other medical complications   []Other:     Assessment:   Pt tolerating WBing well this date. Demo reduction in flexor tone after WBing. Demo contraction and emerging movement with active scap squeeze, shld shrug and elbow extension. Cont per OT poc.        GOALS:  Patient goal: \" Get right arm working. Move back into home. \"     Short Term Goals:  to be met in 2 weeks (by 5/18/2021)     Pt will demonstrate active assisted right shoulder flexion to 50 degrees or better, gravity reduced, while rating pain < 2/10 for improved ROM for functional task performance.  - Progressing, CONT GOAL, 5/4/21     Pt will demonstrate 15o degrees of active assisted elbow flexion against gravity, while rating pain < 0/10 for improved ROM for functional task performance. - Progressing, CONT GOAL, 5/4/21     Pt and caregiver will independently verbalize 2 techniques to obtain optimal positioning of on right vic arm to ensure approximation of glenohumeral joint and decrease risk of complications from subluxation. - CONT GOAL 5/4/21     Pt will participate in cont assessment of visual perceptual status by completing visual tracking and visual field test. . - GOAL MET, 4/6/21, no apparent visual deficits     Pt will report consistent compliance with HEP at least 4x/wk - GOAL MET 5/4/21        Long Term Goals:  to be met in 4 weeks (by 6/1/2021)     Pt will demonstrate active assisted right shoulder flexion to 50 degrees, against gravity, while rating pain < 2/10 for improved ROM for functional task performance. - Progressing, CONT GOAL, 5/4/21     Pt will demonstrate 25o degrees of right active elbow flexion,against gravity, while rating pain < 0/10 for improved ROM for functional task performance. - Progressing, CONT GOAL, 5/4/21     Pt will demonstrate 20o degrees of right active elbow extension while rating pain < 0/10 for improved ROM for functional task performance. - Progressing, CONT GOAL, 5/4/21     Pt will tolerate WBing for 5 min through RUE with elbow and wrist extension for tone management in prep for active assisted movement.  - GOAL MET, 5/4/21  Upgrade to 10 min         Prognosis: [x]Good   []Fair   []Poor    Patient Requires Follow-up:  [x]Yes  []No    Plan: []Plan of care initiated     [x]Continue per plan of care    [] Alter current plan (see comments)    []Hold pending MD visit []Discharge      ---  ---- --- ---- --- ---- --- ---- --- ---- --- ---- --- ---- --- ---- --- ---- --- --- ---    Therapeutic Exercise/Home Exercise Program:    minutes    Therapeutic Activity:   minutes    ADL Training: minutes      Neuromuscular Re-Education:  30 minutes      Manual Therapy:   15 minutes    Splintin minutes     Modalities:  0 minutes    Time in:          0037   Time Out:   1130     Timed Code Treatment Minutes:45    minutes        Total Treatment Time:   45 minutes           Electronically signed by:  Hannah Mercado OTR/L

## 2021-05-11 NOTE — FLOWSHEET NOTE
[facility], [place] [address] [address], [address]   Phone: [phone]  Fax [phone]      Speech-Language Pathology  Daily Treatment Note    Date:  2021    Patient Name:  Yung Mcmanus    :  1955  MRN: [id number]  Restrictions/Precautions:  Limited communication ability due to significant Aphasia  Treatment Diagnosis:    Codes     Aphasia as late effect of cerebrovascular accident (CVA)    -  Primary   Insurance/Certification information: Medicare A & B; [address]   Referring Physician:  Óscar Hanley. Joie Munroe MD  Plan of care signed (Y/N):  Y  Visit# / total visits:     Pain level: No reported or suspected pain noted     Progress Note: []  Yes  [x]  No  Next due by: Visit #10: 4/10 or 21     Subjective: The pt was in overall good spirits. Objective:   Short-term Goals:  Goal 1: NEW GOAL: The pt will say indivdual phrase and short sentences, in response to therapy tasks, with 90% accuracy, mod cues:  - 67% (10/15), mod cues; targeted via picture descriptions    Goal 2: NEW GOAL: The pt will complete basic confrontational and responsive naming with 90% accuracy, min cues  - Confrontational: 70% (7/10), min cues  - Responsive: 70% (7/10), min cues    Goal 3: The pt will use an AAC speech generating device to answer given basic questions with 80% accuracy, mod-max cues:  - Goal not targeted this date. The pt did not bring in his iPad this date to allow targeting of AAC panda. Goal 4: The pt will write single basic words with 50% accuracy, mod-max cues:  - 0% despite max cues given  DISCONTINUE THIS GOAL DUE TO LACK OF PROGRESS    Goal 5:  The pt will complete basic confrontational and responsive naming with 75% accuracy, mod-max cues:  -  GOAL MET    Goal 6: The pt will say indivdual phrase and short sentences, in response to therapy tasks, with 70% accuracy, mod-max cues:  - GOAL MET        NEW GOAL:  - The pt will

## 2021-05-11 NOTE — PROGRESS NOTES
[facility], [facility] [address], [address]   Phone: [phone]  Fax [phone]    Physical Therapy Progress Note    Date:  2021    Patient Name:  Nicole Casey    :  1955  MRN: [id number]  Restrictions/Precautions:    Medical/Treatment Diagnosis Information:  · Diagnosis: CVA, R hemiparesis  Insurance/Certification information:  PT Insurance Information: Medicare  Physician Information:  Referring Practitioner: Thomas Delaney MD  Plan of care signed (Y/N):  N  Visit# / total visits:     ________________________________________________________________________________________    Pain Level:    /10  SUBJECTIVE: Pt reports that he is doing well. Pt is no longer wearing his AFO for ambulation. Pt reports that he can feel himself getting stronger. OBJECTIVE:   Test/Measurements:    PROM RLE (degrees)  R Hip Extension 0-10: 0-5*  R Hip ABduction 0-45: 0-30*  R Knee Flexion 0-145: 5-125*  PROM LLE (degrees)  LLE PROM: WFL  AROM LLE (degrees)  LLE AROM : WFL     Strength RLE  R Hip Flexion: 3+/5  R Hip Extension: 3/5  R Hip ABduction: 3-/5  R Hip External Rotation: 3-/5  R Knee Flexion: 3+/5  R Knee Extension: 3-/5  R Ankle Dorsiflexion: 2/5  R Ankle Plantar flexion: 0/5  R Ankle Inversion: 1/5  R Ankle Eversion: 0/5  R Great Toe Extension: 0/5  R Great Toe Flexion: 0/5       ASSESSMENT:    Pt has completed 12 visits of physical therapy and has shown good improvements with strength, ROM, movement mechanics and functional mobility. Pt no longer wearing the AFO for ambulation due to increase in DF activation during swing through. Pt does continue with decreased purposeful muscle firing of hip flexor and knee extension. Pt would benefit from additional therapy at 2x/wk for 4 weeks to address remaining impairments.      Treatment/Activity Tolerance:   [x]Patient tolerated treatment well [] Patient limited by

## 2021-05-13 ENCOUNTER — HOSPITAL ENCOUNTER (OUTPATIENT)
Dept: SPEECH THERAPY | Age: 66
Setting detail: THERAPIES SERIES
Discharge: HOME | End: 2021-05-13
Payer: MEDICARE

## 2021-05-13 ENCOUNTER — HOSPITAL ENCOUNTER (OUTPATIENT)
Dept: OCCUPATIONAL THERAPY | Age: 66
Setting detail: THERAPIES SERIES
End: 2021-05-13
Payer: MEDICARE

## 2021-05-13 ENCOUNTER — HOSPITAL ENCOUNTER (OUTPATIENT)
Dept: PHYSICAL THERAPY | Age: 66
Setting detail: THERAPIES SERIES
Discharge: HOME | End: 2021-05-13
Payer: MEDICARE

## 2021-05-13 PROCEDURE — 97112 NEUROMUSCULAR REEDUCATION: CPT

## 2021-05-13 PROCEDURE — 92507 TX SP LANG VOICE COMM INDIV: CPT

## 2021-05-13 PROCEDURE — 97110 THERAPEUTIC EXERCISES: CPT

## 2021-05-13 NOTE — FLOWSHEET NOTE
[facility], [place] [address] [address]  [address] [place] [address], [address] [place] [address]   Phone: [phone]  Fax [phone]      Speech-Language Pathology  Daily Treatment Note    Date:  2021    Patient Name:  Swathi Rodrigues    :  1955  MRN: [id number]  Restrictions/Precautions:  Limited communication ability due to significant Aphasia  Treatment Diagnosis:    Codes     Aphasia as late effect of cerebrovascular accident (CVA)    -  Primary   Insurance/Certification information: Medicare A & B; [address]   Referring Physician:  Paulo Bai. Rito Spring MD  Plan of care signed (Y/N):  Y  Visit# / total visits:     Pain level: No reported or suspected pain noted     Progress Note: []  Yes  [x]  No  Next due by: Visit #10: 5/10 or 21     Subjective: The pt was pleasant and cooperative. He continues to need encouragement when verbal expression deficits occur. Objective:   Short-term Goals:  Goal 1: NEW GOAL: The pt will say indivdual phrase and short sentences, in response to therapy tasks, with 90% accuracy, mod cues:  - 71% (15/21), mod cues; targeted via picture descriptions and open ended questions    Goal 2: NEW GOAL: The pt will complete basic confrontational and responsive naming with 90% accuracy, min cues  - Goal targeted indirectly through other treatment tasks. Goal 3: The pt will use an AAC speech generating device to answer given basic questions with 80% accuracy, mod-max cues:  - Goal not targeted this date. The pt did not bring in his iPad this date to allow targeting of AAC panda. Goal 4: NEW GOAL: The pt will recognize spoken letters from a field of 5 with 95% accuracy, min-mod cues:  20% (1/5), min-mod cues; The pt was also asked to copy the letter and to he was cued to say words that also began with that letter. Goal 5:  The pt will complete basic confrontational and responsive naming with 75% accuracy, mod-max cues:  -  GOAL MET    Goal 6: The pt will say indivdual phrase and short sentences, in response to therapy tasks, with 70% accuracy, mod-max cues:  - GOAL MET    Goal 7: The pt will write single basic words with 50% accuracy, mod-max cues:  - DISCONTINUE THIS GOAL DUE TO LACK OF PROGRESS    Assessment:   Continued overall progress is noted. Plan:   Continued Speech Therapy services 2 x week for 6 weeks. Total Treatment Time / Charges     Time in Time out Total Time / units   Cognitive Tx         Speech Tx 0945 1030 45 min / 1 unit   Dysphagia Tx           Signature:     Marcela Rogers MA, [id number] [place] [address]  [id number]  Speech-Language Pathologist  Phone #: [phone]  Fax #: [phone]

## 2021-05-13 NOTE — FLOWSHEET NOTE
[facility], [facility], [address]  Phone: [phone]  Fax [phone]    Physical Therapy Daily Treatment Note    Date:  2021    Patient Name:  María Ospina    :  1955  MRN: [id number]  Restrictions/Precautions:    Medical/Treatment Diagnosis Information:  · Diagnosis: CVA, R hemiparesis  Insurance/Certification information:  PT Insurance Information: Medicare  Physician Information:  Referring Practitioner: Xiomy Arthur MD  Plan of care signed (Y/N):  N  Visit# / total visits:      G-Code (if applicable):          LEFS     Medicare Cap (if applicable):  5125 = total amount used, updated 2021    Time in:   9:00   Timed Treatment: 45  Total Treatment Time:  45  ________________________________________________________________________________________    Pain Level:    /10  SUBJECTIVE: Pt reports that he is doing well. Nothing new to report. OBJECTIVE: [id number]    Exercise/Equipment Resistance/Repetitions Other comments   NuStep 5 min           Supine leg press  Supine hip flexion 3 min  3 min Manual resistance   Clams #1 2x10 BLE    Bridge    Single limb x15  x15    PHE    Bent knee x10  x10 RLE In side-lying this session   PKF 2x5 RLE In sidelying this session   Supine clams x15 yellow   Supine hip neuro re-ed 3 min RLE    LAQ x10 RLE With TB assist   Seated hamstring curls x10 With maxislide        Side-lying knee extension/flexion x10 RLE    Hooklying ADD ball squeeze    With hip flexion   x10           Slant board 3x30\"    Seated QS 10x5\" RLE With hamstring stretch   TG 5 min    Standing step over object 3 min BLE    Minisquat x10 cueing     Other Therapeutic Activities:      Manual Treatments:         Modalities:      Test/Measurements:         ASSESSMENT:    Pt tolerated session well. Pt required moderate cueing for glut activation during minisquat.  Improving with standing

## 2021-05-18 ENCOUNTER — HOSPITAL ENCOUNTER (OUTPATIENT)
Dept: OCCUPATIONAL THERAPY | Age: 66
Setting detail: THERAPIES SERIES
Discharge: HOME | End: 2021-05-18
Payer: MEDICARE

## 2021-05-18 ENCOUNTER — HOSPITAL ENCOUNTER (OUTPATIENT)
Dept: SPEECH THERAPY | Age: 66
Setting detail: THERAPIES SERIES
Discharge: HOME | End: 2021-05-18
Payer: MEDICARE

## 2021-05-18 ENCOUNTER — HOSPITAL ENCOUNTER (OUTPATIENT)
Dept: PHYSICAL THERAPY | Age: 66
Setting detail: THERAPIES SERIES
Discharge: HOME | End: 2021-05-18
Payer: MEDICARE

## 2021-05-18 PROCEDURE — 97535 SELF CARE MNGMENT TRAINING: CPT

## 2021-05-18 PROCEDURE — 97110 THERAPEUTIC EXERCISES: CPT

## 2021-05-18 PROCEDURE — 92507 TX SP LANG VOICE COMM INDIV: CPT

## 2021-05-18 PROCEDURE — 97140 MANUAL THERAPY 1/> REGIONS: CPT

## 2021-05-18 PROCEDURE — 97112 NEUROMUSCULAR REEDUCATION: CPT

## 2021-05-18 NOTE — FLOWSHEET NOTE
[facility], [facility], [address] [place] [address]  Phone: [phone]  Fax [phone]    Physical Therapy Daily Treatment Note    Date:  2021    Patient Name:  Palma Mac    :  1955  MRN: [id number]  Restrictions/Precautions:    Medical/Treatment Diagnosis Information:  · Diagnosis: CVA, R hemiparesis  Insurance/Certification information:  PT Insurance Information: Medicare  Physician Information:  Referring Practitioner: Latesha Kaba MD  Plan of care signed (Y/N):  N  Visit# / total visits:      G-Code (if applicable):          LEFS     Medicare Cap (if applicable):  6833 = total amount used, updated 2021    Time in:   9:00   Timed Treatment: 45  Total Treatment Time:  45  ________________________________________________________________________________________    Pain Level:    /10  SUBJECTIVE: Pt reports that he is doing well. Nothing new to report. OBJECTIVE: [id number]    Exercise/Equipment Resistance/Repetitions Other comments   NuStep 5 min           Supine leg press  Supine hip flexion 3 min  3 min Manual resistance   Clams #1 2x10 BLE    Bridge    Single limb x15  x15    PHE    Bent knee x10  x10 RLE In side-lying this session   PKF 2x5 RLE In sidelying this session   Supine clams x15 yellow   Supine hip neuro re-ed 3 min RLE    LAQ x10 RLE With TB assist   Seated hamstring curls x10 With maxislide        Side-lying knee extension/flexion x10 RLE    Hooklying ADD ball squeeze    With hip flexion   x10           Slant board 3x30\"    Seated QS 10x5\" RLE With hamstring stretch   TG 5 min    Standing step over object 3 min BLE    Minisquat x10 cueing     Other Therapeutic Activities:      Manual Treatments:         Modalities:      Test/Measurements:         ASSESSMENT:    Pt tolerated session well. Pt required moderate cueing for glut activation during minisquat.  Improving with standing exercise. Patient needed breaks due to spasticity during treatment, weightbearing and STM reduced symptoms and enabled continued treatment within 1 minute. Treatment/Activity Tolerance:   [x]Patient tolerated treatment well [] Patient limited by fatique  []Patient limited by pain [] Patient limited by other medical complications  [] Other:     Goals:    Short term goals  Time Frame for Short term goals: 6 weeks  Short term goal 1: pt will be indep in HEP  Short term goal 2: pt will increase R DF strength by 1/3 muscle grade  Short term goal 3: pt will ambulate with increased glut activation RLE  Short term goal 4: pt will decrease TUG time 10 sec  Short term goal 5: pt will increase LEFS score by 10 points to hit MCID           Plan: [x] Continue per plan of care [] Alter current plan (see comments)   [] Plan of care initiated [] Hold pending MD visit [] Discharge      Plan for Next Session:      Re-Certification Due Date:         Signature:  Bee Bae, PT , PT                      Cristal SHAW  Physical therapist was present, directed the patient's care, made skilled judgement, and was responsible for the assessment and treatment of the patient.

## 2021-05-18 NOTE — FLOWSHEET NOTE
[facility], [facility], [address], [address]   Phone: [phone]  Fax [phone]      Speech-Language Pathology  Daily Treatment Note    Date:  2021    Patient Name:  Car Patel    :  1955  MRN: [id number]  Restrictions/Precautions:  Limited communication ability due to significant Aphasia  Treatment Diagnosis:    Codes     Aphasia as late effect of cerebrovascular accident (CVA)    -  Primary   Insurance/Certification information: Medicare A & B; [address]   Referring Physician:  Angela Salomon. Oumar Garcia MD  Plan of care signed (Y/N):  Y  Visit# / total visits:  3/12   Pain level: No reported or suspected pain noted     Progress Note: []  Yes  [x]  No  Next due by: Visit #10: 6/10 or 21     Subjective: The pt was in overall good spirits. Noted further improved spoken output often starting spoken sentences on his own. Objective:   Short-term Goals:  Goal 1: NEW GOAL: The pt will say indivdual phrase and short sentences, in response to therapy tasks, with 90% accuracy, mod cues:  - 72% (16/22), mod cues; targeted via picture descriptions and open ended questions    Goal 2: NEW GOAL: The pt will complete basic confrontational and responsive naming with 90% accuracy, min cues  - Confrontational: 60% (6/10), min cues  - Responsive: 60% (6/10), min cues    Goal 3: The pt will use an AAC speech generating device to answer given basic questions with 80% accuracy, mod-max cues:  - Goal not targeted this date. The pt did not bring in his iPad this date to allow targeting of AAC panda. Goal 4: NEW GOAL: The pt will recognize spoken letters from a field of 5 with 95% accuracy, min-mod cues:  60% (3/5), min-mod cues and significant extra time; The pt was also asked to copy the letter and to he was cued to copy and say words that also began with that letter. Goal 5:  The pt will complete basic confrontational and responsive naming with 75% accuracy, mod-max cues:  -  GOAL MET    Goal 6: The pt will say indivdual phrase and short sentences, in response to therapy tasks, with 70% accuracy, mod-max cues:  - GOAL MET    Goal 7: The pt will write single basic words with 50% accuracy, mod-max cues:  - DISCONTINUE THIS GOAL DUE TO LACK OF PROGRESS    Assessment:   Continued overall progress is noted. Plan:   Continued Speech Therapy services 2 x week for 6 weeks. Total Treatment Time / Charges     Time in Time out Total Time / units   Cognitive Tx         Speech Tx 0945 1045 60 min / 1 unit   Dysphagia Tx           Signature:     Oscar Ortega MA, [id number]  Speech-Language Pathologist  Phone #: [phone]  Fax #: [phone]

## 2021-05-18 NOTE — FLOWSHEET NOTE
toileting tasks with modified indepedence    Modified indep with dof/don overhead shirts x2  Standing to dof, seated to don []        []       NEUROMUSCULAR RE-EDU and THERAPEUTIC ACTIVITY        Education on stroke recovery Role of OT  Tone and management of tone  Typical progression of UE function after stoke   [] Pt's sister-in-law verbalized understanding   Pt will benefit from reinforcement d/t communication deficits. WBing RUE, fully ext  EOM  10 min    Weight shifting toward right for increased proprioceptive input [] Denies pain with WBing   Right shoulder movement Scap squeeze, 3x  shld shrug, 3x    Completed in front of mirror for visual feed back  Contraction noted with shrug, slight movement noted  Contraction and movement noted with squeeze      Use of inflatable arm splint to isolate shoulder muscles for facilitating normal movement patterns and reduce compensation    EOM - use of table top at shld ht  Active:Horizontal adduction  Active assisted: Horizontal abduction    Supine - Active assisted:   shld flex , 5x  shld ext, 5x  Elbow flex, 5x  Elbow ext, 5x   Reviewed scap squeeze and shld shrug to HEP - recommended in front of mirror    Noted pt initiating movement, even against gravity. Increased effort required - cues for breathing     Demo flex synergy, clemente with attempts at active elbow flex    Noted contraction at right tricep with elbow ext   Card sorting Unable to follow commands for sorting by suit. Able to sort by color with min cues/explaination  Sorting entire deck with only 1 error  Completed while WBing to facilitate weight shift to right   Mirror Therapy Educated pt on purpose of and research re: mirror therapy    Pt participated in mirror therapy for 10 min  Completing exercises with left hand while watching reflection  2-3 cues to cont to watch reflexion  Pt  Will benefit from cont education d/t comprehension may be limited by aphasia.       Arm skate Seated EOM, using rolling/hieght adjustable table    Pushing cones of edge of table (16x)    Demo active movement for: (flexor synergy)  Horizontal adduction, elbow flex and shld ext    Active assist for:  Horizontal abduction, elbow ext, shld flex [] Increased effort required - cues for breathing        THERAPEUTIC EXERCISE and MANUAL TECHNIQUES     AAROM  Attempted active assisted shld flexion    Pt demo shld abduction, elbow/wrist flex with attempts at active movement. PROM RUE  - Supine    Shld flexion to ~90o  Shld abduction to ~90o  ER to ~10-15o  Elbow ext  Wrist ext to neutral with increased effort  Digit ext with increased effort   [] Pt's sister in law trained on PROM techniques for shld flex, shld abd and elbow ext. She returned demo and verbalized understanding. Emailed program via [employer]. Self ROM shld flexion while supine  Instructed to avoid flex >90  Pt returned demo [] Reviewed for HEP     Scap mobs  - right EOM - All direction    Supine - scap adduction []       MODALITIES         []        []       SPLINTING       RHS Pt reports pre-bettina resting hand splint at home as recommended by home OT. Pt reports difficult to don independently []      Home Exercise Program: PROM by caregiver, self ROM, Shoulder/scap exercises with visual feedback    Treatment/Activity Tolerance:    Patients response to treatment:   [x]Patient tolerated treatment well []Patient limited by fatigue   []Patient limited by pain  []Patient limited by other medical complications   []Other:     Assessment:   Noted rash at pt's right under arm after pt c/o pain. Instructed pt to inform staff at [address] [place] [address]. Pt indicated understanding. Pt tolerating WBing well this date. Demo reduction in flexor tone after WBing. Demo contraction and emerging movement with active scap squeeze, shld shrug and elbow extension. Cont per OT poc.        GOALS:  Patient goal: \" Get right arm working. Move back into home.  \"     Short Term Goals:  to be met in 2 weeks (by 5/18/2021)     Pt will demonstrate active assisted right shoulder flexion to 50 degrees or better, gravity reduced, while rating pain < 2/10 for improved ROM for functional task performance. - Progressing, CONT GOAL, 5/4/21     Pt will demonstrate 15o degrees of active assisted elbow flexion against gravity, while rating pain < 0/10 for improved ROM for functional task performance. - Progressing, CONT GOAL, 5/4/21     Pt and caregiver will independently verbalize 2 techniques to obtain optimal positioning of on right vic arm to ensure approximation of glenohumeral joint and decrease risk of complications from subluxation. - CONT GOAL 5/4/21     Pt will participate in cont assessment of visual perceptual status by completing visual tracking and visual field test. . - GOAL MET, 4/6/21, no apparent visual deficits     Pt will report consistent compliance with HEP at least 4x/wk - GOAL MET 5/4/21        Long Term Goals:  to be met in 4 weeks (by 6/1/2021)     Pt will demonstrate active assisted right shoulder flexion to 50 degrees, against gravity, while rating pain < 2/10 for improved ROM for functional task performance. - Progressing, CONT GOAL, 5/4/21     Pt will demonstrate 25o degrees of right active elbow flexion,against gravity, while rating pain < 0/10 for improved ROM for functional task performance. - Progressing, CONT GOAL, 5/4/21     Pt will demonstrate 20o degrees of right active elbow extension while rating pain < 0/10 for improved ROM for functional task performance. - Progressing, CONT GOAL, 5/4/21     Pt will tolerate WBing for 5 min through RUE with elbow and wrist extension for tone management in prep for active assisted movement.  - GOAL MET, 5/4/21  Upgrade to 10 min         Prognosis: [x]Good   []Fair   []Poor    Patient Requires Follow-up:  [x]Yes  []No    Plan: []Plan of care initiated     [x]Continue per plan of care    [] Alter current plan (see comments)    []Hold pending MD

## 2021-05-20 ENCOUNTER — HOSPITAL ENCOUNTER (OUTPATIENT)
Dept: PHYSICAL THERAPY | Age: 66
Setting detail: THERAPIES SERIES
Discharge: HOME | End: 2021-05-20
Payer: MEDICARE

## 2021-05-20 ENCOUNTER — HOSPITAL ENCOUNTER (OUTPATIENT)
Dept: OCCUPATIONAL THERAPY | Age: 66
Setting detail: THERAPIES SERIES
Discharge: HOME | End: 2021-05-20
Payer: MEDICARE

## 2021-05-20 ENCOUNTER — HOSPITAL ENCOUNTER (OUTPATIENT)
Dept: SPEECH THERAPY | Age: 66
Setting detail: THERAPIES SERIES
Discharge: HOME | End: 2021-05-20
Payer: MEDICARE

## 2021-05-20 PROCEDURE — 97032 APPL MODALITY 1+ESTIM EA 15: CPT

## 2021-05-20 PROCEDURE — 97112 NEUROMUSCULAR REEDUCATION: CPT

## 2021-05-20 PROCEDURE — 97110 THERAPEUTIC EXERCISES: CPT

## 2021-05-20 PROCEDURE — 97116 GAIT TRAINING THERAPY: CPT

## 2021-05-20 PROCEDURE — 92507 TX SP LANG VOICE COMM INDIV: CPT

## 2021-05-20 NOTE — FLOWSHEET NOTE
[facility], [facility],  [address], [address]   Phone: [phone]  Fax [phone]      Speech-Language Pathology  Daily Treatment Note    Date:  2021    Patient Name:  María Ospina    :  1955  MRN: [id number]  Restrictions/Precautions:  Limited communication ability due to significant Aphasia  Treatment Diagnosis:    Codes     Aphasia as late effect of cerebrovascular accident (CVA)    -  Primary   Insurance/Certification information: Medicare A & B; [address]   Referring Physician:  Fidel Granado. Eyal Chun MD  Plan of care signed (Y/N):  Y  Visit# / total visits:     Pain level: No reported or suspected pain noted     Progress Note: []  Yes  [x]  No  Next due by: Visit #10: 7/10 or 21     Subjective: The pt was in good spirits. He continues to make significant progress in therapy except in the areas of reading and writing. Today use of \"[employer]\" were introduced to target basic level reading. With assistance from the therapist he was able to read x 2 [employer] books. His sister-in-law was contacted to inform her of the benefit of [employer] books and she was told to obtain some of these to work with the pt at home. Objective:   Short-term Goals:  Goal 1: NEW GOAL: The pt will say indivdual phrase and short sentences, in response to therapy tasks, with 90% accuracy, mod cues:  - 80% (8/10), mod cues; targeted via picture descriptions and open ended questions    Goal 2: NEW GOAL: The pt will complete basic confrontational and responsive naming with 90% accuracy, min cues  - Confrontational: 50% (5/10), min cues  - Responsive: 50% (5/10), min cues    Goal 3: The pt will use an AAC speech generating device to answer given basic questions with 80% accuracy, mod-max cues:  - Goal not targeted this date. The pt did not bring in his iPad this date to allow targeting of AAC panda.     Goal 4: NEW GOAL: The pt will recognize spoken letters from a field of 5 with 95% accuracy, min-mod cues:  40% (2/5), min-mod cues and significant extra time; The pt was also asked to copy the letter and to he was cued to copy and say words that also began with that letter; This continues to be a struggle. As a result basic \"[employer]\" were targeted with some success. Goal 5: The pt will complete basic confrontational and responsive naming with 75% accuracy, mod-max cues:  -  GOAL MET    Goal 6: The pt will say indivdual phrase and short sentences, in response to therapy tasks, with 70% accuracy, mod-max cues:  - GOAL MET    Goal 7: The pt will write single basic words with 50% accuracy, mod-max cues:  - DISCONTINUE THIS GOAL DUE TO LACK OF PROGRESS      Other Treatments:  - The pt read x 2 \"[employer] Books\" with assistance from the therapist.    Assessment:   Continued overall progress is noted. Plan:   Continued Speech Therapy services 2 x week for 6 weeks. Total Treatment Time / Charges     Time in Time out Total Time / units   Cognitive Tx         Speech Tx 0945 1045 60 min / 1 unit   Dysphagia Tx           Signature:     Susanne Ramsay MA, [address] [facility]  [facility][id number]  Speech-Language Pathologist  Phone #: [phone]  Fax #: [phone]

## 2021-05-20 NOTE — FLOWSHEET NOTE
[facility], [facility], [address]  Phone: [phone]  Fax [phone]    Physical Therapy Daily Treatment Note    Date:  2021    Patient Name:  Inés Bob    :  1955  MRN: [id number]  Restrictions/Precautions:    Medical/Treatment Diagnosis Information:  · Diagnosis: CVA, R hemiparesis  Insurance/Certification information:  PT Insurance Information: Medicare  Physician Information:  Referring Practitioner: Tami Arriaga MD  Plan of care signed (Y/N):  N  Visit# / total visits:  12/12 3/8    G-Code (if applicable):          LEFS     Medicare Cap (if applicable):  3447 = total amount used, updated 2021    Time in:   9:00   Timed Treatment: 45  Total Treatment Time:  45  ________________________________________________________________________________________    Pain Level:    /10  SUBJECTIVE: Pt reports that he is doing well. Nothing new to report.      OBJECTIVE: [id number]    Exercise/Equipment Resistance/Repetitions Other comments   NuStep 5 min           Supine leg press  Supine hip flexion 3 min  3 min Manual resistance   Clams #1 2x10 BLE    Bridge    Single limb x15  x15    PHE    Bent knee x10  x10 RLE In side-lying this session   PKF 2x5 RLE In sidelying this session   Supine clams x15 yellow   Supine hip neuro re-ed 3 min RLE    LAQ x10 RLE With TB assist   Seated hamstring curls x10 With maxislide        Side-lying knee extension/flexion x10 RLE    Hooklying ADD ball squeeze    With hip flexion   x10      Backwards walking alternating with resistance walking in parallel bars 6 laps Red band for forwards walking   Slant board 3x30\"    Seated QS 10x5\" RLE With hamstring stretch   TG 5 min    Standing step over object 3 min BLE    Minisquat x10 cueing     Other Therapeutic Activities:      Manual Treatments:         Modalities:      Test/Measurements:         ASSESSMENT:    Pt tolerated session well. Pt required moderate cueing for glut activation during minisquat. Improving with standing exercise. Patient had no episodes of spasticity requiring stoppage of treatment today. Functional activities added focusing on increasing push off and Left leg step length to focus on patient goal of improved gait speed and control. Treatment/Activity Tolerance:   [x]Patient tolerated treatment well [] Patient limited by fatique  []Patient limited by pain [] Patient limited by other medical complications  [] Other:     Goals:    Short term goals  Time Frame for Short term goals: 6 weeks  Short term goal 1: pt will be indep in HEP  Short term goal 2: pt will increase R DF strength by 1/3 muscle grade  Short term goal 3: pt will ambulate with increased glut activation RLE  Short term goal 4: pt will decrease TUG time 10 sec  Short term goal 5: pt will increase LEFS score by 10 points to hit MCID           Plan: [x] Continue per plan of care [] Alter current plan (see comments)   [] Plan of care initiated [] Hold pending MD visit [] Discharge      Plan for Next Session:      Re-Certification Due Date:         Signature:  Ju Andrew, PT , PT                      Nilo SHAW  Physical therapist was present, directed the patient's care, made skilled judgement, and was responsible for the assessment and treatment of the patient.

## 2021-05-20 NOTE — FLOWSHEET NOTE
[facility], [facility] [address], [address]   Phone: [phone]  Fax [phone]      Outpatient Occupational Therapy     [x] Daily Treatment Note   [] Progress Note   [] Discharge Note    Date:  5/20/2021    Patient Name:  Leonela Pederson         YOB: 1955    Medical Diagnosis/ICD10[de-identified]   CVA/I63.9, Right hemiparesis, Right side neglect, Expressive aphasia  Treatment Diagnosis: Impaired self care status due to decreased functional use of RUE/RLE  Referring Physician: Dr. Sandra Houser    Referral Date:  3/15/2021  Onset Date:  Nov 2019  Visits Allowed/Insurance/Certification Information:   Medicare A/B, AARP  Restrictions/Precautions Right vic, aphasia    Plan of care sent to provider:    [x]Faxed (date:   ) []Co-signature    (attempts: 1[x] 2 []3[])        Plan of care signed:    [x]Yes (date:5/5/21  )           []No       Progress Note covers period from (if applicable):    [x]NA    []From 5/6/21         To           Next Progress Note due:   6/1/2021    Visit# / total visits:  4/8; 7/12    Functional Outcome Measure:   3/25/2021: QuickDASH: 42    Subjective: Pt arrived alone. Pt ambulating with SBQC. Reporting pain in right underarm. Pain level: denies    Plan for Next Session:  WBing  NMES - elbow ext  PROM in supine    Self ROM  Mirror therapy    Objective:       Exercises:    Exercises in bold performed in department today. Items not bolded are carried forward from prior visits for continuity of the record.   Exercise/Equipment Resistance/Repetitions HEP Other comments      ADL/IADL TRAINING       LB dressing Doff/don right shoe and AFO with modified independence as pt required increased time    [] Increased effort this date d/t new shoes    Per PT, pt no longer requires AFO 4/15/21     Functional Mobility Pt ambulating with SBQC, w/o AFO - with mod ind  OT<>bathroom  OT>parking lot []    ADLs Pt performed toileting tasks with modified indepedence    Modified indep with dof/don overhead shirts x2  Standing to dof, seated to don []        []       NEUROMUSCULAR RE-EDU and THERAPEUTIC ACTIVITY        Education on stroke recovery Role of OT  Tone and management of tone  Typical progression of UE function after stoke   [] Pt's sister-in-law verbalized understanding   Pt will benefit from reinforcement d/t communication deficits. WBing RUE, fully ext  EOM  10 min    Weight shifting toward right for increased proprioceptive input [] Denies pain with WBing    NMES in place during WBing   Right shoulder movement Scap squeeze, 3x  shld shrug, 3x    Completed in front of mirror for visual feed back  Contraction noted with shrug, slight movement noted  Contraction and movement noted with squeeze      Use of inflatable arm splint to isolate shoulder muscles for facilitating normal movement patterns and reduce compensation    EOM - use of table top at shld ht  Active:Horizontal adduction  Active assisted: Horizontal abduction    Supine - Active assisted:   shld flex , 5x  shld ext, 5x  Elbow flex, 5x  Elbow ext, 5x   Reviewed scap squeeze and shld shrug to HEP - recommended in front of mirror    Noted pt initiating movement, even against gravity. Increased effort required - cues for breathing     Demo flex synergy, clemente with attempts at active elbow flex    Noted contraction at right tricep with elbow ext   Card sorting Unable to follow commands for sorting by suit. Able to sort by color with min cues/explaination  Sorting entire deck with only 1 error  Completed while WBing to facilitate weight shift to right   Mirror Therapy Educated pt on purpose of and research re: mirror therapy    Pt participated in mirror therapy for 10 min  Completing exercises with left hand while watching reflection  2-3 cues to cont to watch reflexion  Pt  Will benefit from cont education d/t comprehension may be limited by aphasia. ext noted. Demo slight reduction in flexor tone after WBing with NMES. Cont per OT poc.        GOALS:  Patient goal: \" Get right arm working. Move back into home. \"     Short Term Goals:  to be met in 2 weeks (by 5/18/2021)     Pt will demonstrate active assisted right shoulder flexion to 50 degrees or better, gravity reduced, while rating pain < 2/10 for improved ROM for functional task performance. - Progressing, CONT GOAL, 5/4/21     Pt will demonstrate 15o degrees of active assisted elbow flexion against gravity, while rating pain < 0/10 for improved ROM for functional task performance. - Progressing, CONT GOAL, 5/4/21     Pt and caregiver will independently verbalize 2 techniques to obtain optimal positioning of on right vic arm to ensure approximation of glenohumeral joint and decrease risk of complications from subluxation. - CONT GOAL 5/4/21     Pt will participate in cont assessment of visual perceptual status by completing visual tracking and visual field test. . - GOAL MET, 4/6/21, no apparent visual deficits     Pt will report consistent compliance with HEP at least 4x/wk - GOAL MET 5/4/21        Long Term Goals:  to be met in 4 weeks (by 6/1/2021)     Pt will demonstrate active assisted right shoulder flexion to 50 degrees, against gravity, while rating pain < 2/10 for improved ROM for functional task performance. - Progressing, CONT GOAL, 5/4/21     Pt will demonstrate 25o degrees of right active elbow flexion,against gravity, while rating pain < 0/10 for improved ROM for functional task performance. - Progressing, CONT GOAL, 5/4/21     Pt will demonstrate 20o degrees of right active elbow extension while rating pain < 0/10 for improved ROM for functional task performance. - Progressing, CONT GOAL, 5/4/21     Pt will tolerate WBing for 5 min through RUE with elbow and wrist extension for tone management in prep for active assisted movement.  - GOAL MET, 5/4/21  Upgrade to 10 min         Prognosis: [x]Good   []Fair   []Poor    Patient Requires Follow-up:  [x]Yes  []No    Plan: []Plan of care initiated     [x]Continue per plan of care    [] Alter current plan (see comments)    []Hold pending MD visit []Discharge      ---  ---- --- ---- --- ---- --- ---- --- ---- --- ---- --- ---- --- ---- --- ---- --- --- ---    Therapeutic Exercise/Home Exercise Program:    minutes    Therapeutic Activity:   minutes    ADL Training:    minutes      Neuromuscular Re-Education:  30 minutes      Manual Therapy:    minutes    Splintin minutes     Modalities:  15 minutes    Time in:          0506   Time Out:   1130     Timed Code Treatment Minutes:45    minutes        Total Treatment Time:   45 minutes           Electronically signed by:  Manolo Kern OT, OTR/BETINA

## 2021-05-25 ENCOUNTER — HOSPITAL ENCOUNTER (OUTPATIENT)
Dept: OCCUPATIONAL THERAPY | Age: 66
Setting detail: THERAPIES SERIES
Discharge: HOME | End: 2021-05-25
Payer: MEDICARE

## 2021-05-25 ENCOUNTER — HOSPITAL ENCOUNTER (OUTPATIENT)
Dept: PHYSICAL THERAPY | Age: 66
Setting detail: THERAPIES SERIES
Discharge: HOME | End: 2021-05-25
Payer: MEDICARE

## 2021-05-25 ENCOUNTER — HOSPITAL ENCOUNTER (OUTPATIENT)
Dept: SPEECH THERAPY | Age: 66
Setting detail: THERAPIES SERIES
Discharge: HOME | End: 2021-05-25
Payer: MEDICARE

## 2021-05-25 PROCEDURE — 92507 TX SP LANG VOICE COMM INDIV: CPT

## 2021-05-25 PROCEDURE — 97110 THERAPEUTIC EXERCISES: CPT

## 2021-05-25 PROCEDURE — 97032 APPL MODALITY 1+ESTIM EA 15: CPT

## 2021-05-25 PROCEDURE — 97116 GAIT TRAINING THERAPY: CPT

## 2021-05-25 PROCEDURE — 97112 NEUROMUSCULAR REEDUCATION: CPT

## 2021-05-25 NOTE — FLOWSHEET NOTE
[facility], [facility], [address] [place] [address], [address] [place] [address]  Phone: [phone]  Fax [phone]    Physical Therapy Daily Treatment Note    Date:  2021    Patient Name:  Amanda Shah    :  1955  MRN: [id number]  Restrictions/Precautions:    Medical/Treatment Diagnosis Information:  · Diagnosis: CVA, R hemiparesis  Insurance/Certification information:  PT Insurance Information: Medicare  Physician Information:  Referring Practitioner: Monica Quintero MD  Plan of care signed (Y/N):  N  Visit# / total visits:  12/12 3/8    G-Code (if applicable):          LEFS     Medicare Cap (if applicable):  1718 = total amount used, updated 2021    Time in:   9:00   Timed Treatment: 45  Total Treatment Time:  45  ________________________________________________________________________________________    Pain Level:    /10  SUBJECTIVE: Pt reports that he is doing well. Nothing new to report. OBJECTIVE: [id number]    Exercise/Equipment Resistance/Repetitions Other comments   NuStep 5 min           Supine leg press  Supine hip flexion 3 min  3 min Manual resistance   Clams #1 2x10 BLE    Bridge    Single limb x15  x15    PHE    Bent knee x10  x10 RLE In side-lying this session   PKF 2x5 RLE In sidelying this session   Supine clams x15 yellow   Supine hip neuro re-ed 3 min RLE    LAQ x10 RLE With TB assist   Seated hamstring curls x10 With maxislide        Side-lying knee extension/flexion x10 RLE    Hooklying ADD ball squeeze    With hip flexion   x10      Backwards walking alternating with resistance walking in parallel bars 10 laps Red band for forwards walking, 3 # weight removed long-term through to increase stride length.    Slant board 3x30\"    Seated QS 10x5\" RLE With hamstring stretch   TG 5 min    Standing step over object 3 min BLE    Minisquat x10 cueing     Other Therapeutic Activities:      Manual Treatments:         Modalities: Test/Measurements:         ASSESSMENT:    Pt tolerated session well. Pt required moderate cueing to maintain active quadriceps contraction this visit. Improving with standing exercise. Patient had no episodes of spasticity requiring stoppage of treatment today. Functional activities continued focusing on increasing push off and Left leg step length to focus on patient goal of improved gait speed and control. Treatment/Activity Tolerance:   [x]Patient tolerated treatment well [] Patient limited by fatique  []Patient limited by pain [] Patient limited by other medical complications  [] Other:     Goals:    Short term goals  Time Frame for Short term goals: 6 weeks  Short term goal 1: pt will be indep in HEP  Short term goal 2: pt will increase R DF strength by 1/3 muscle grade  Short term goal 3: pt will ambulate with increased glut activation RLE  Short term goal 4: pt will decrease TUG time 10 sec  Short term goal 5: pt will increase LEFS score by 10 points to hit MCID           Plan: [x] Continue per plan of care [] Alter current plan (see comments)   [] Plan of care initiated [] Hold pending MD visit [] Discharge      Plan for Next Session:      Re-Certification Due Date:         Signature:  Franca Willett, PT , PT                      Vince Lacy [facility]  Physical therapist was present, directed the patient's care, made skilled judgement, and was responsible for the assessment and treatment of the patient.

## 2021-05-25 NOTE — FLOWSHEET NOTE
and short sentences, in response to therapy tasks, with 70% accuracy, mod-max cues:  - GOAL MET    Goal 7: The pt will write single basic words with 50% accuracy, mod-max cues:  - DISCONTINUE THIS GOAL DUE TO LACK OF PROGRESS      Other Treatments:  - The pt read x 2 \"[employer]\" with assistance from the therapist.    Assessment:   Continued overall progress is noted. Plan:   Continued Speech Therapy services 2 x week for 6 weeks. Total Treatment Time / Charges     Time in Time out Total Time / units   Cognitive Tx         Speech Tx 0945 1030 45 min / 1 unit   Dysphagia Tx           Signature:     Pratik Dobbs MA, [id number] [id number]  Speech-Language Pathologist  Phone #: [phone]  Fax #: [phone]

## 2021-05-25 NOTE — FLOWSHEET NOTE
[facility], [facility], [facility], [address] [place] [address]   Phone: [phone]  Fax [phone]      Outpatient Occupational Therapy     [x] Daily Treatment Note   [] Progress Note   [] Discharge Note    Date:  5/25/2021    Patient Name:  Inés Bob         YOB: 1955    Medical Diagnosis/ICD10[de-identified]   CVA/I63.9, Right hemiparesis, Right side neglect, Expressive aphasia  Treatment Diagnosis: Impaired self care status due to decreased functional use of RUE/RLE  Referring Physician: Dr. Wilfredo Munoz    Referral Date:  3/15/2021  Onset Date:  Nov 2019  Visits Allowed/Insurance/Certification Information:   Medicare A/B, AARP  Restrictions/Precautions Right vic, aphasia    Plan of care sent to provider:    [x]Faxed (date:   ) []Co-signature    (attempts: 1[x] 2 []3[])        Plan of care signed:    [x]Yes (date:5/5/21  )           []No       Progress Note covers period from (if applicable):    [x]NA    []From 5/6/21         To           Next Progress Note due:   6/1/2021    Visit# / total visits:  5/8; 7/12    Functional Outcome Measure:   3/25/2021: QuickDASH: 42    Subjective: Pt arrived alone. Pt ambulating with SBQC. Pain level: denies    Plan for Next Session:  WBing  NMES - elbow/wrist ext  PROM in supine    Self ROM  Mirror therapy    Objective:       Exercises:    Exercises in bold performed in department today. Items not bolded are carried forward from prior visits for continuity of the record.   Exercise/Equipment Resistance/Repetitions HEP Other comments      ADL/IADL TRAINING       LB dressing Doff/don right shoe and AFO with modified independence as pt required increased time    [] Increased effort this date d/t new shoes    Per PT, pt no longer requires AFO 4/15/21     Functional Mobility Pt ambulating with SBQC, w/o AFO - with mod ind  OT<>bathroom  OT>parking lot []    ADLs Pt performed toileting tasks with modified contraction, facilitating elbow ext. Limited wrist ext noted, but good digit and thumb ext noted. Demo slight reduction in flexor tone after WBing with NMES. Cont per OT poc.        GOALS:  Patient goal: \" Get right arm working. Move back into home. \"     Short Term Goals:  to be met in 2 weeks (by 5/18/2021)     Pt will demonstrate active assisted right shoulder flexion to 50 degrees or better, gravity reduced, while rating pain < 2/10 for improved ROM for functional task performance. - Progressing, CONT GOAL, 5/4/21     Pt will demonstrate 15o degrees of active assisted elbow flexion against gravity, while rating pain < 0/10 for improved ROM for functional task performance. - Progressing, CONT GOAL, 5/4/21     Pt and caregiver will independently verbalize 2 techniques to obtain optimal positioning of on right vic arm to ensure approximation of glenohumeral joint and decrease risk of complications from subluxation. - CONT GOAL 5/4/21     Pt will participate in cont assessment of visual perceptual status by completing visual tracking and visual field test. . - GOAL MET, 4/6/21, no apparent visual deficits     Pt will report consistent compliance with HEP at least 4x/wk - GOAL MET 5/4/21        Long Term Goals:  to be met in 4 weeks (by 6/1/2021)     Pt will demonstrate active assisted right shoulder flexion to 50 degrees, against gravity, while rating pain < 2/10 for improved ROM for functional task performance. - Progressing, CONT GOAL, 5/4/21     Pt will demonstrate 25o degrees of right active elbow flexion,against gravity, while rating pain < 0/10 for improved ROM for functional task performance. - Progressing, CONT GOAL, 5/4/21     Pt will demonstrate 20o degrees of right active elbow extension while rating pain < 0/10 for improved ROM for functional task performance.  - Progressing, CONT GOAL, 5/4/21     Pt will tolerate WBing for 5 min through RUE with elbow and wrist extension for tone management in prep for active assisted movement.  - GOAL MET, 21  Upgrade to 10 min         Prognosis: [x]Good   []Fair   []Poor    Patient Requires Follow-up:  [x]Yes  []No    Plan: []Plan of care initiated     [x]Continue per plan of care    [] Alter current plan (see comments)    []Hold pending MD visit []Discharge      ---  ---- --- ---- --- ---- --- ---- --- ---- --- ---- --- ---- --- ---- --- ---- --- --- ---    Therapeutic Exercise/Home Exercise Program:    minutes    Therapeutic Activity:   minutes    ADL Training:    minutes      Neuromuscular Re-Education:  30 minutes      Manual Therapy:    minutes    Splintin minutes     Modalities:  15 minutes    Time in:          1551   Time Out:   1130     Timed Code Treatment Minutes:45    minutes        Total Treatment Time:   45 minutes           Electronically signed by:  Loretta Wolfe OT, OTR/L

## 2021-05-27 ENCOUNTER — HOSPITAL ENCOUNTER (OUTPATIENT)
Dept: SPEECH THERAPY | Age: 66
Setting detail: THERAPIES SERIES
Discharge: HOME | End: 2021-05-27
Payer: MEDICARE

## 2021-05-27 ENCOUNTER — HOSPITAL ENCOUNTER (OUTPATIENT)
Dept: PHYSICAL THERAPY | Age: 66
Setting detail: THERAPIES SERIES
Discharge: HOME | End: 2021-05-27
Payer: MEDICARE

## 2021-05-27 ENCOUNTER — HOSPITAL ENCOUNTER (OUTPATIENT)
Dept: OCCUPATIONAL THERAPY | Age: 66
Setting detail: THERAPIES SERIES
Discharge: HOME | End: 2021-05-27
Payer: MEDICARE

## 2021-05-27 PROCEDURE — 97112 NEUROMUSCULAR REEDUCATION: CPT

## 2021-05-27 PROCEDURE — 97110 THERAPEUTIC EXERCISES: CPT

## 2021-05-27 PROCEDURE — 92507 TX SP LANG VOICE COMM INDIV: CPT

## 2021-05-27 PROCEDURE — 97140 MANUAL THERAPY 1/> REGIONS: CPT

## 2021-05-27 NOTE — FLOWSHEET NOTE
[facility], [facility] [place] [place], [address] [facility], [address] [facility]  Phone: [phone]  Fax [phone]    Physical Therapy Daily Treatment Note    Date:  2021    Patient Name:  Hien Christopher    :  1955  MRN: [id number]  Restrictions/Precautions:    Medical/Treatment Diagnosis Information:  · Diagnosis: CVA, R hemiparesis  Insurance/Certification information:  PT Insurance Information: Medicare  Physician Information:  Referring Practitioner: Deann Seaman MD  Plan of care signed (Y/N):  N  Visit# / total visits:      G-Code (if applicable):          LEFS     Medicare Cap (if applicable):  0183 = total amount used, updated 2021    Time in:   9:03  Timed Treatment: 45  Total Treatment Time:  45  ________________________________________________________________________________________    Pain Level:    /10  SUBJECTIVE: Pt reports that he is doing well. Nothing new to report. OBJECTIVE: [id number]    Exercise/Equipment Resistance/Repetitions Other comments   NuStep 5 min           Supine leg press  Supine hip flexion 3 min  3 min Manual resistance   Clams #1 2x10 BLE    Bridge    Single limb x15  x15    PHE    Bent knee x10  x10 RLE In side-lying this session   PKF 2x5 RLE In sidelying this session   Supine clams x15 yellow   Supine hip neuro re-ed 3 min RLE    LAQ x10 RLE With TB assist   Seated hamstring curls x10 With maxislide        Side-lying knee extension/flexion x10 RLE    Hooklying ADD ball squeeze    With hip flexion   x10      Backwards walking alternating with resistance walking in parallel bars 10 laps Red band for forwards walking, 3 # weight removed FCI through to increase stride length.    Slant board 3x30\"    Seated QS 10x5\" RLE With hamstring stretch   TG 5 min    Standing step over object 3 min BLE    Minisquat x10 cueing     Other Therapeutic Activities:      Manual Treatments:         Modalities: Test/Measurements:         ASSESSMENT:    Pt tolerated session well. Pt required minimal cueing to maintain active quadriceps contraction this visit. Improving with standing exercise. Patient between visit retention was significantly improved this visit, needed minimal cueing to remember HEP activities, but still needed minimal cueing to perform without compensation. Patient had no episodes of spasticity requiring stoppage of treatment today. Functional activities continued focusing on increasing push off and Left leg step length to focus on patient goal of improved gait speed and control. Treatment/Activity Tolerance:   [x]Patient tolerated treatment well [] Patient limited by fatique  []Patient limited by pain [] Patient limited by other medical complications  [] Other:     Goals:    Short term goals  Time Frame for Short term goals: 6 weeks  Short term goal 1: pt will be indep in HEP  Short term goal 2: pt will increase R DF strength by 1/3 muscle grade  Short term goal 3: pt will ambulate with increased glut activation RLE  Short term goal 4: pt will decrease TUG time 10 sec  Short term goal 5: pt will increase LEFS score by 10 points to hit MCID           Plan: [x] Continue per plan of care [] Alter current plan (see comments)   [] Plan of care initiated [] Hold pending MD visit [] Discharge      Plan for Next Session:      Re-Certification Due Date:         Signature:  Mary Kay Duckworth, PT , PT                      Linsey SHAW  Physical therapist was present, directed the patient's care, made skilled judgement, and was responsible for the assessment and treatment of the patient.

## 2021-05-27 NOTE — FLOWSHEET NOTE
[facility] Therapy, [facility], [address] [facility], [address]   Phone: [phone]  Fax [phone]      Outpatient Occupational Therapy     [x] Daily Treatment Note   [] Progress Note   [] Discharge Note    Date:  5/27/2021    Patient Name:  Reyes Frias         YOB: 1955    Medical Diagnosis/ICD10[de-identified]   CVA/I63.9, Right hemiparesis, Right side neglect, Expressive aphasia  Treatment Diagnosis: Impaired self care status due to decreased functional use of RUE/RLE  Referring Physician: Dr. Melissa Marinelli    Referral Date:  3/15/2021  Onset Date:  Nov 2019  Visits Allowed/Insurance/Certification Information:   Medicare A/B, AARP  Restrictions/Precautions Right vic, aphasia    Plan of care sent to provider:    [x]Faxed (date:   ) []Co-signature    (attempts: 1[x] 2 []3[])        Plan of care signed:    [x]Yes (date:5/5/21  )           []No       Progress Note covers period from (if applicable):    [x]NA    []From 5/6/21         To           Next Progress Note due:   6/1/2021    Visit# / total visits:  6/8; 7/12    Functional Outcome Measure:   3/25/2021: QuickDASH: 42    Subjective: Pt arrived alone. Pt ambulating with SBQC. Pain level: denies    Plan for Next Session:  WBing  NMES - elbow/wrist ext  PROM in supine    Self ROM  Mirror therapy    Objective:       Exercises:    Exercises in bold performed in department today. Items not bolded are carried forward from prior visits for continuity of the record.   Exercise/Equipment Resistance/Repetitions HEP Other comments      ADL/IADL TRAINING       LB dressing Doff/don right shoe and AFO with modified independence as pt required increased time    [] Increased effort this date d/t new shoes    Per PT, pt no longer requires AFO 4/15/21     Functional Mobility Pt ambulating with SBQC, w/o AFO - with mod ind  OT<>bathroom  OT>parking lot []    ADLs Pt performed toileting tasks with modified indepedence    Modified indep with dof/don overhead shirts x2  Standing to dof, seated to don []        []       NEUROMUSCULAR RE-EDU and THERAPEUTIC ACTIVITY        Education on stroke recovery Role of OT  Tone and management of tone  Typical progression of UE function after stoke   [] Pt's sister-in-law verbalized understanding   Pt will benefit from reinforcement d/t communication deficits. WBing RUE, fully ext  EOM  15 min     Inflatable arm splint in place to maintain elbow ext    Weight shifting toward right for increased proprioceptive input [] Denies pain with WBing    NMES in place during WBing   Right shoulder movement Scap squeeze, 3x  shld shrug, 3x    Completed in front of mirror for visual feed back  Contraction noted with shrug, slight movement noted  Contraction and movement noted with squeeze      Use of inflatable arm splint to isolate shoulder muscles for facilitating normal movement patterns and reduce compensation however - Difficulty isolating desired muscles - significant compensation noted      EOM - use of table top at shld ht  Active:Horizontal adduction  Active assisted: Horizontal abduction    Supine - Active assisted:   shld flex , 5x   shld ext, 5x  Elbow flex, 5x  Elbow ext, 5x   Reviewed scap squeeze and shld shrug to HEP - recommended in front of mirror    Noted pt initiating movement, even against gravity. Increased effort required - cues for breathing      Demo flex synergy, clemente with attempts at active elbow flex    Noted contraction at right tricep with elbow ext   Card sorting Unable to follow commands for sorting by suit.     Able to sort by color with min cues/explaination  Sorting entire deck with only 1 error  Completed while WBing to facilitate weight shift to right   Mirror Therapy Educated pt on purpose of and research re: mirror therapy    Pt participated in mirror therapy for 10 min  Completing exercises with left hand while watching reflection  2-3 cues to cont to watch reflexion  Pt  Will benefit from cont education d/t comprehension may be limited by aphasia. Arm skate Seated EOM, using rolling/hieght adjustable table    Pushing cones of edge of table (16x)    Demo active movement for: (flexor synergy)  Horizontal adduction, elbow flex and shld ext    Active assist for:  Horizontal abduction, elbow ext, shld flex [] Increased effort required - cues for breathing        THERAPEUTIC EXERCISE and MANUAL TECHNIQUES     AAROM  Attempted active assisted shld flexion    Pt demo shld abduction, elbow/wrist flex with attempts at active movement. PROM RUE  - Supine    Shld flexion to ~100o  Shld abduction to ~90o  ER to ~10-15o  Elbow ext  Wrist ext with increased effort  Digit ext with increased effort   [] Pt's sister in law trained on PROM techniques for shld flex, shld abd and elbow ext. She returned demo and verbalized understanding. Emailed program via [address] [place] [address]. Self ROM shld flexion while supine  Instructed to avoid flex >90  Pt returned demo [] Reviewed for HEP     Scap mobs  - right EOM - All direction    Supine - scap adduction []       MODALITIES     NMES Right elbow ext  Intensity: 20  Time: 10 min    Right wrist/digit ext  Intensity: 18  Time: 18 min    Attempted grasp/release exercise during NMES to wrist/digit ext however attempts at active movement increasing flexor tone [] Empi unit    Symmetrical/synchronous  10s on/ 5s off  35 PPS  300 wavelength  2.0 ramp time       []       SPLINTING       RHS Pt reports pre-bettina resting hand splint at home as recommended by home OT.      Pt reports difficult to don independently []      Home Exercise Program: PROM by caregiver, self ROM, Shoulder/scap exercises with visual feedback    Treatment/Activity Tolerance:    Patients response to treatment:   [x]Patient tolerated treatment well []Patient limited by fatigue   []Patient limited by pain  []Patient limited by other medical complications   []Other:     Assessment:   Pt tolerating RUE PROM and participating in NMR and isolating shld muscles when attempting to facilitate normal movement patterns. Pt limited by aphasia and apraxia. Pt tolerating WBing for 15 min. Demo temporary reduction in flexor tone after WBing. Cont per OT poc.        GOALS:  Patient goal: \" Get right arm working. Move back into home. \"     Short Term Goals:  to be met in 2 weeks (by 5/18/2021)     Pt will demonstrate active assisted right shoulder flexion to 50 degrees or better, gravity reduced, while rating pain < 2/10 for improved ROM for functional task performance. - Progressing, CONT GOAL, 5/4/21     Pt will demonstrate 15o degrees of active assisted elbow flexion against gravity, while rating pain < 0/10 for improved ROM for functional task performance. - Progressing, CONT GOAL, 5/4/21     Pt and caregiver will independently verbalize 2 techniques to obtain optimal positioning of on right vic arm to ensure approximation of glenohumeral joint and decrease risk of complications from subluxation. - CONT GOAL 5/4/21     Pt will participate in cont assessment of visual perceptual status by completing visual tracking and visual field test. . - GOAL MET, 4/6/21, no apparent visual deficits     Pt will report consistent compliance with HEP at least 4x/wk - GOAL MET 5/4/21        Long Term Goals:  to be met in 4 weeks (by 6/1/2021)     Pt will demonstrate active assisted right shoulder flexion to 50 degrees, against gravity, while rating pain < 2/10 for improved ROM for functional task performance. - Progressing, CONT GOAL, 5/4/21     Pt will demonstrate 25o degrees of right active elbow flexion,against gravity, while rating pain < 0/10 for improved ROM for functional task performance. - Progressing, CONT GOAL, 5/4/21     Pt will demonstrate 20o degrees of right active elbow extension while rating pain < 0/10 for improved ROM for functional task performance.  -

## 2021-05-27 NOTE — FLOWSHEET NOTE
write single basic words with 50% accuracy, mod-max cues:  - DISCONTINUE THIS GOAL DUE TO LACK OF PROGRESS      Other Treatments:      Assessment:   Continued overall progress is noted. Plan:   Continued Speech Therapy services 2 x week for 6 weeks. Total Treatment Time / Charges     Time in Time out Total Time / units   Cognitive Tx         Speech Tx 0910 1030 45 min / 1 unit   Dysphagia Tx           Signature:     Modesta Botello MA, [address] [id number]  Speech-Language Pathologist  Phone #: [phone]  Fax #: [phone]

## 2021-05-27 NOTE — PROGRESS NOTES
[facility], [facility],  Miriam Chenfélix Santiagoena, [address]   Phone: [phone]  Fax [phone]        Speech Therapy Progress Note         The following patient has been evaluated for therapy services. Please review the attached summary of the patient's plan of care, and verify that you agree with plan for additional therapy services at this time. Thank you for the referral of this patient. Please sign the attached certification form. Physician signature_______________________ Date________________      Fax to: [facility] [phone]         Date: 2021        Patient Name:  Palma Mac    :  1955  MRN: [id number]  Restrictions/Precautions:  Limited communication ability due to significant Aphasia  Treatment Diagnosis:     Codes     Aphasia as late effect of cerebrovascular accident ([address]   Insurance/Certification information: Medicare A & B; [id number]   Referring Physician:  Margurette Seip. Cherene Soulier, MD  Plan of care signed (Y/N):  Y  Visit# / total visits:     Pain level: No reported or suspected pain noted           Time Period for Report:  21 to 21  Cancels/No-shows to date:  None    Plan of Care/Treatment to date:  [x] Speech-Language Evaluation/Treatment    [] Dysphagia Evaluation/Treatment        [] Dysphagia Treatment via Neuromuscular Electrical Stimulation (NMES)   [] Modified Barium Swallowing Study  [] Fiberoptic Endoscopic Evaluation of Swallowing (FEES)    [] Cognitive-Linguistic Skills Development  [] Voice evaluation and Treatment      [] Evaluation, modification, and Training of Voice Prosthetic     [] Evaluation for Speech-Generating Augmentative and Alternative Communication Device   [] Therapeutic Services for the use of Speech-Generating Device.    [] Other:     Significant Findings At Last Visit/Comments:    Subjective:  ·  The pt was in overall good spirits. He still becomes frustrated but is not as distracted by this as he used to be. His sister-in-law continues to report that her and people at the pt's facility have noted a significant improvement in the pt's spoken verbal output. Objective:   Observation: Improve overall verbal output with more fluent phrases produced. Assessment:   Summary: The pt continues to demonstrate significant progress. This has especially been true in terms of the pt's verbal expression. He is completing naming and sentence production tasks with increasing accuracy. Reading and writing remain his weakness. The pt still struggles to identify single letters despite given cues. Considering the significant progress made, specifically in the area of verbal expression, continued Speech Therapy services are recommended.  Patient's response to treatment: Pleasant and cooperative. Progress towards goals:    Short-term Goals:  Goal 1: NEW GOAL: The pt will say indivdual phrase and short sentences, in response to therapy tasks, with 90% accuracy, mod cues:  - 70% (14/20), mod cues  GOAL PARTIALLY MET     Goal 2: NEW GOAL: The pt will complete basic confrontational and responsive naming with 90% accuracy, min cues  - Confrontational: 80% (8/10), min cues  - Responsive: 90% (9/10), min cues  GOAL PARTIALLY MET     Goal 3: NEW GOAL: The pt will recognize spoken letters from a field of 5 with 95% accuracy, min-mod cues:  - 40% (4/10), min-mod cues  GOAL NOT MET     Goal 4: The pt will use an AAC speech generating device to answer given basic questions with 80% accuracy, mod-max cues:  - DISCONTINUE GOAL. The pt has not been using or bring his iPad and has not been wanting to use AAC communication due to improved verbal communication.     Goal 5:  The pt will complete basic confrontational and responsive naming with 75% accuracy, mod-max cues:  -  GOAL MET     Goal 6: The pt will say indivdual phrase and short sentences, in response to therapy tasks, with 70% accuracy, mod-max cues:  - GOAL MET     Goal 7: The pt will write single basic words with 50% accuracy, mod-max cues:  - DISCONTINUE THIS GOAL DUE TO LACK OF PROGRESS     Current Frequency/Duration:  # Days per week: [] 1 day # Weeks: [] 1 week [x] 4 weeks      [x] 2 days   [] 2 weeks [] 5 weeks      [] 3 days   [] 3 weeks [] 6 weeks     Rehab Potential: [] Excellent [x] Good [] Fair  [] Poor     Goal Status:  [] Achieved [x] Partially Achieved  [] Not Achieved     Patient Status: [] Continue per initial plan of Care     [] Patient now discharged     [x] Additional visits requested, Please re-certify for additional visits:      Requested frequency/duration: 2  X/week for 4 weeks    Electronically signed by:  CORWIN Cat  Phone: [phone]  Fax: [phone]    If you have any questions or concerns, please don't hesitate to call.   Thank you for your referral.

## 2021-06-01 ENCOUNTER — HOSPITAL ENCOUNTER (OUTPATIENT)
Dept: OCCUPATIONAL THERAPY | Age: 66
Setting detail: THERAPIES SERIES
Discharge: HOME | End: 2021-06-01
Payer: MEDICARE

## 2021-06-01 ENCOUNTER — APPOINTMENT (OUTPATIENT)
Dept: SPEECH THERAPY | Age: 66
End: 2021-06-01
Payer: MEDICARE

## 2021-06-01 PROCEDURE — 97112 NEUROMUSCULAR REEDUCATION: CPT

## 2021-06-01 PROCEDURE — 97535 SELF CARE MNGMENT TRAINING: CPT

## 2021-06-01 PROCEDURE — 97140 MANUAL THERAPY 1/> REGIONS: CPT

## 2021-06-01 NOTE — FLOWSHEET NOTE
[facility], [facility], [address] [facility], [address]   Phone: [phone]  Fax [phone]      Outpatient Occupational Therapy     [x] Daily Treatment Note   [] Progress Note   [] Discharge Note    Date:  6/1/2021    Patient Name:  Kimmy Elizalde         YOB: 1955    Medical Diagnosis/ICD10[de-identified]   CVA/I63.9, Right hemiparesis, Right side neglect, Expressive aphasia  Treatment Diagnosis: Impaired self care status due to decreased functional use of RUE/RLE  Referring Physician: Dr. Abdiel Wallace    Referral Date:  3/15/2021  Onset Date:  Nov 2019  Visits Allowed/Insurance/Certification Information:   Medicare A/B, AARP  Restrictions/Precautions Right vic, aphasia    Plan of care sent to provider:    [x]Faxed (date:   ) []Co-signature    (attempts: 1[x] 2 []3[])        Plan of care signed:    [x]Yes (date:5/5/21  )           []No       Progress Note covers period from (if applicable):    []NA    [x]From 5/6/21         To     6/1/21      Next Progress Note due:   6/1/2021    Visit# / total visits:  7/8; 7/12    Functional Outcome Measure:   3/25/2021: QuickDASH: 42    Subjective: Pt arrived alone. Pt ambulating with SBQC. Pain level: denies    Plan for Next Session:  WBing  NMES - elbow/wrist ext  PROM in supine    Self ROM  Mirror therapy    Objective:     Range of Motion     AROM                RIGHT  RIGHT RIGHT   LEFT      Date 3/25/2021  5/4/21 6/1/21   3/25/2021     Shoulder:   flex x  x x   WNL                        ABD ~75o 70-75o  See comments   WNL     Elbow:      ext/flex x See comments     WNL     Forearm:  sup/pron x  x    WNL     Wrist:       ext/flex x  x    WNL     Digits: x  x    WNL        Comments:   PROM of RUE WFL with increased time d/t flexor tone (see Modified Rian Scale below)   Demo active shrug and scap squeeze on right with verbal cues and increased time.    Attempts at active movement result in Other comments      ADL/IADL TRAINING       LB dressing Doff/don right shoe and AFO with modified independence as pt required increased time    [] Increased effort this date d/t new shoes    Per PT, pt no longer requires AFO 4/15/21     Functional Mobility Pt ambulating with SBQC, w/o AFO - with mod ind  OT<>bathroom  OT>parking lot []    ADLs Pt performed toileting tasks with modified indepedence    Modified indep with dof/don overhead shirts x2  Standing to dof, seated to don []        []       NEUROMUSCULAR RE-EDU and THERAPEUTIC ACTIVITY        Education on stroke recovery Role of OT  Tone and management of tone  Typical progression of UE function after stoke     Reviewed recommendation for meeting with PM&R MD to discuss tone management options   [] Pt's sister-in-law verbalized understanding   Pt will benefit from reinforcement d/t communication deficits. Discussed PM&R consult with pt's sister in law during phone call on 5/27/21      WBing RUE, fully ext  EOM  5 min     Inflatable arm splint in place to maintain elbow ext    Weight shifting toward right for increased proprioceptive input [] Denies pain with WBing    NMES in place during [address] [facility]   Right shoulder movement Scap squeeze, 3x  shld shrug, 3x    Completed in front of mirror for visual feed back  Contraction noted with shrug, slight movement noted  Contraction and movement noted with squeeze      Use of inflatable arm splint to isolate shoulder muscles for facilitating normal movement patterns and reduce compensation however - Difficulty isolating desired muscles - significant compensation noted      EOM - use of table top at shld ht  Active:Horizontal adduction  Active assisted: Horizontal abduction    Supine - Active assisted:   shld flex , 5x   shld ext, 5x  Elbow flex, 5x  Elbow ext, 5x   Reviewed scap squeeze and shld shrug to HEP - recommended in front of mirror    Noted pt initiating movement, even against gravity.                Increased effort required - cues for breathing      Demo flex synergy, clemente with attempts at active elbow flex    Noted contraction at right tricep with elbow ext   Card sorting Unable to follow commands for sorting by suit. Able to sort by color with min cues/explaination  Sorting entire deck with only 1 error  Completed while WBing to facilitate weight shift to right   Mirror Therapy Educated pt on purpose of and research re: mirror therapy    Pt participated in mirror therapy for 10 min  Completing exercises with left hand while watching reflection  2-3 cues to cont to watch reflexion  Pt  Will benefit from cont education d/t comprehension may be limited by aphasia. Arm skate Seated EOM, using rolling/hieght adjustable table    Pushing cones of edge of table (16x)    Demo active movement for: (flexor synergy)  Horizontal adduction, elbow flex and shld ext    Active assist for:  Horizontal abduction, elbow ext, shld flex [] Increased effort required - cues for breathing        THERAPEUTIC EXERCISE and MANUAL TECHNIQUES     AAROM  Attempted active assisted shld flexion    Pt demo shld abduction, elbow/wrist flex with attempts at active movement. PROM RUE  - Supine    Shld flexion to ~100o  Shld abduction to ~90o  ER to ~10-15o  Elbow ext  Wrist ext with increased effort  Digit ext with increased effort   [] Pt's sister in law trained on PROM techniques for shld flex, shld abd and elbow ext. She returned demo and verbalized understanding. Emailed program via [address] [place] [address].       Self ROM shld flexion while supine  Instructed to avoid flex >90  Pt returned demo [] Reviewed for HEP     Scap mobs  - right EOM - All direction    Supine - scap adduction    Side lying - scap depression, upward and downward rotation []       MODALITIES     NMES Right elbow ext  Intensity: 20  Time: 10 min    Right wrist/digit ext  Intensity: 18  Time: 18 min    Attempted grasp/release exercise during NMES to wrist/digit ext however attempts at active movement increasing flexor tone [] Empi unit    Symmetrical/synchronous  10s on/ 5s off  35 PPS  300 wavelength  2.0 ramp time       []       SPLINTING       RHS Pt reports pre-bettina resting hand splint at home as recommended by home OT. Pt reports difficult to don independently []      Home Exercise Program: PROM by caregiver, self ROM, Shoulder/scap exercises with visual feedback    Treatment/Activity Tolerance:    Patients response to treatment:   [x]Patient tolerated treatment well []Patient limited by fatigue   []Patient limited by pain  []Patient limited by other medical complications   []Other:     Assessment:   Progress note completed this date. Pt demo minimal progress toward OT goals as Pt met 0/3 STG and 1/4 LTG. Progress limited by flexor tone in RUE that increases with attempts at active movement. Tone reduction techniques utilized during OT sessions result in only a temporary reduction in tone. Recommend pt meet with PM&R doctor to discuss tone management techniques. Pt and caregiver agreeable and caregiver reports plans to make an appointment with Dr. Nazia Lord. 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 Pt will cont to benefit from outpatient occupational therapy to address decreased functional use of RUE and decreased participation in IADL tasks. . Recommend cont OP OT 2x/wk for 4 weeks. See updated OT poc below.        GOALS:  Patient goal: \" Get right arm working. Move back into home. \"     Short Term Goals:  to be met in 2 weeks (by 6/15/2021)     Pt will demonstrate active assisted right shoulder flexion to 50 degrees or better, gravity reduced, while rating pain < 2/10 for improved ROM for functional task performance. - Progressing, CONT GOAL, 6/1/21     Pt will demonstrate 15o degrees of active assisted elbow flexion against gravity, while rating pain < 0/10 for improved ROM for functional task performance.  - Progressing, CONT GOAL, 6/1/21     Pt and caregiver will independently verbalize 2 techniques to obtain optimal positioning of on right vic arm to ensure approximation of glenohumeral joint and decrease risk of complications from subluxation. - CONT GOAL 21    Pt will participate in assessment of brake reaction time with each foot. - Goal Added, 21       Long Term Goals:  to be met in 4 weeks (by 2021)     Pt will demonstrate active assisted right shoulder flexion to 50 degrees, against gravity, while rating pain < 2/10 for improved ROM for functional task performance. - Progressing, CONT GOAL, 21     Pt will demonstrate 25o degrees of right active elbow flexion,against gravity, while rating pain < 0/10 for improved ROM for functional task performance. - Progressing, CONT GOAL, 21     Pt will demonstrate 20o degrees of right active elbow extension while rating pain < 0/10 for improved ROM for functional task performance. - Progressing, CONT GOAL, 21     Pt will tolerate WBing for 10 min through RUE with elbow and wrist extension for tone management in prep for active assisted movement.  - GOAL MET, 21      Pt will demo independence with ability to acheive WBing position on RUE with elbow and wrist extension to facilitate participation in [place] at home.        Prognosis: [x]Good   [x]Fair   []Poor    Patient Requires Follow-up:  [x]Yes  []No    Plan: []Plan of care initiated     [x]Continue per plan of care    [] Alter current plan (see comments)    []Hold pending MD visit []Discharge      ---  ---- --- ---- --- ---- --- ---- --- ---- --- ---- --- ---- --- ---- --- ---- --- --- ---    Therapeutic Exercise/Home Exercise Program:    minutes    Therapeutic Activity:   minutes    ADL Training:   10 minutes      Neuromuscular Re-Education:  30 minutes      Manual Therapy: 15   minutes    Splintin minutes     Modalities:   minutes    Time in:       1448   Time Out:   1000     Timed Code Treatment Minutes:55    minutes        Total Treatment Time:   55 minutes           Electronically signed by:  Amadou Stafford, OT, OTR/L

## 2021-06-01 NOTE — PROGRESS NOTES
Physician: Dr. Soto Rodgers    Referral Date:  3/15/2021  Onset Date:  Nov 2019  Visits Allowed/Insurance/Certification Information:   Medicare A/B, AARP  Restrictions/Precautions Right vic, aphasia    Plan of care sent to provider:    [x]Faxed (date:   ) []Co-signature    (attempts: 1[x] 2 []3[])        Plan of care signed:    [x]Yes (date:5/5/21  )           []No       Progress Note covers period from (if applicable):    []NA    [x]From 5/6/21         To     6/1/21      Next Progress Note due:   6/29/2021    Visit# / total visits:  7/8; 7/12    Functional Outcome Measure:   3/25/2021: QuickDASH: 42    Subjective: Pt arrived alone. Pt ambulating with SBQC. Pain level: denies      Objective:     Range of Motion     AROM                RIGHT  RIGHT RIGHT   LEFT      Date 3/25/2021  5/4/21 6/1/21   3/25/2021     Shoulder:   flex x  x x   WNL                        ABD ~75o 70-75o  See comments   WNL     Elbow:      ext/flex x See comments     WNL     Forearm:  sup/pron x  x    WNL     Wrist:       ext/flex x  x    WNL     Digits: x  x    WNL        Comments:   PROM of RUE WFL with increased time d/t flexor tone (see Modified Rian Scale below)   Demo active shrug and scap squeeze on right with verbal cues and increased time. Attempts at active movement result in right shld extension/abduction with compensation at upper trap, elbow flexion, pronation and wrist/digit flex. Demo contraction with attempts at active elbow flex/ext. Requires active assist to move <15o into flexion. Flexor tone limits active elbow ext. No active movement noted in wrist/digits.    No sublux noted in right [facility] joint.      Strength  Muscle  (*denotes pain) Right   Right   Left   DATE 3/25/2021 5/4/21  6/1/21   3/25/2021   Shoulder Flexion  1  ? 1   WNL   Shoulder Abduction  2+ 3-  2+   WNL   Elbow Flexion 1?  1 1   WNL   Elbow Extension 0  1 1   WNL   Pronation 0  0 0   WNL   Supination 0  0 0   WNL   Wrist Flexion 0  0 0   WNL   Wrist Extension 0  0 0   WNL         Assessment of UE tone/spasticity:    Date: 3/25/2021 5/4/21   6/1/21     Shoulder flexors 2 1  2      Internal rotators 1  1+ 1+      External rotators 0  0 0      Elbow flexors 1+  (clonus)  1  (at end range)  1+     Elbow extensor 1+ 1+   1     Supinators 0  0  0     Pronators 1+  1  1+     Wrist flexors 2  2  3     Wrist extensors 0 0   0     Digit flexors 2-3  2 2      Digit extensors 0  0  0           Modified Rian Scale  0    No increase in muscle tone  1    Slight increase in muscle tone, manifested by a catch and release or by minimal        resistance at the end of the range of motion when the affected part(s) is moved in        flexion or extension  1+ Slight increase in muscle tone, manifested by a catch, followed by minimal        resistance throughout the remainder (less than half) of the ROM  2    More marked increase in muscle tone through most of the ROM, but        affected part(s) easily moved  3    Considerable increase in muscle tone, passive movement difficult  4    Affected part(s) rigid in flexion or extension       Exercises:    Exercises in bold performed in department today. Items not bolded are carried forward from prior visits for continuity of the record.   Exercise/Equipment Resistance/Repetitions HEP Other comments      ADL/IADL TRAINING       LB dressing Doff/don right shoe and AFO with modified independence as pt required increased time    [] Increased effort this date d/t new shoes    Per PT, pt no longer requires AFO 4/15/21     Functional Mobility Pt ambulating with SBQC, w/o AFO - with mod ind  OT<>bathroom  OT>parking lot []    ADLs Pt performed toileting tasks with modified indepedence    Modified indep with dof/don overhead shirts x2  Standing to dof, seated to don []        []       NEUROMUSCULAR RE-EDU and THERAPEUTIC ACTIVITY        Education on stroke recovery Role of OT  Tone and management of tone  Typical progression of UE function after zafar     Reviewed recommendation for meeting with PM&R MD to discuss tone management options   [] Pt's sister-in-law verbalized understanding   Pt will benefit from reinforcement d/t communication deficits. Discussed PM&R consult with pt's sister in law during phone call on 5/27/21      WBing RUE, fully ext  EOM  5 min     Inflatable arm splint in place to maintain elbow ext    Weight shifting toward right for increased proprioceptive input [] Denies pain with WBing    NMES in place during [address]   Right shoulder movement Scap squeeze, 3x  shld shrug, 3x    Completed in front of mirror for visual feed back  Contraction noted with shrug, slight movement noted  Contraction and movement noted with squeeze      Use of inflatable arm splint to isolate shoulder muscles for facilitating normal movement patterns and reduce compensation however - Difficulty isolating desired muscles - significant compensation noted      EOM - use of table top at shld ht  Active:Horizontal adduction  Active assisted: Horizontal abduction    Supine - Active assisted:   shld flex , 5x   shld ext, 5x  Elbow flex, 5x  Elbow ext, 5x   Reviewed scap squeeze and shld shrug to HEP - recommended in front of mirror    Noted pt initiating movement, even against gravity. Increased effort required - cues for breathing      Demo flex synergy, clemente with attempts at active elbow flex    Noted contraction at right tricep with elbow ext   Card sorting Unable to follow commands for sorting by suit.     Able to sort by color with min cues/explaination  Sorting entire deck with only 1 error  Completed while WBing to facilitate weight shift to right   Mirror Therapy Educated pt on purpose of and research re: mirror therapy    Pt participated in mirror therapy for 10 min  Completing exercises with left hand while watching reflection  2-3 cues to cont to watch reflexion  Pt  Will benefit from cont education d/t comprehension may be limited by aphasia. Arm skate Seated EOM, using rolling/hieght adjustable table    Pushing cones of edge of table (16x)    Demo active movement for: (flexor synergy)  Horizontal adduction, elbow flex and shld ext    Active assist for:  Horizontal abduction, elbow ext, shld flex [] Increased effort required - cues for breathing        THERAPEUTIC EXERCISE and MANUAL TECHNIQUES     AAROM  Attempted active assisted shld flexion    Pt demo shld abduction, elbow/wrist flex with attempts at active movement. PROM RUE  - Supine    Shld flexion to ~100o  Shld abduction to ~90o  ER to ~10-15o  Elbow ext  Wrist ext with increased effort  Digit ext with increased effort   [] Pt's sister in law trained on PROM techniques for shld flex, shld abd and elbow ext. She returned demo and verbalized understanding. Emailed program via Edgard Johnson. Self ROM shld flexion while supine  Instructed to avoid flex >90  Pt returned demo [] Reviewed for HEP     Scap mobs  - right EOM - All direction    Supine - scap adduction    Side lying - scap depression, upward and downward rotation []       MODALITIES     NMES Right elbow ext  Intensity: 20  Time: 10 min    Right wrist/digit ext  Intensity: 18  Time: 18 min    Attempted grasp/release exercise during NMES to wrist/digit ext however attempts at active movement increasing flexor tone [] Empi unit    Symmetrical/synchronous  10s on/ 5s off  35 PPS  300 wavelength  2.0 ramp time       []       SPLINTING       RHS Pt reports pre-bettina resting hand splint at home as recommended by home OT.      Pt reports difficult to don independently []      Home Exercise Program: PROM by caregiver, self ROM, Shoulder/scap exercises with visual feedback    Treatment/Activity Tolerance:    Patients response to treatment:   [x]Patient tolerated treatment well []Patient limited by fatigue   []Patient limited by pain  []Patient limited by other medical complications   []Other:     Assessment:   Progress note completed this date. Pt demo minimal progress toward OT goals as Pt met 0/3 STG and 1/4 LTG. Progress limited by flexor tone in RUE that increases with attempts at active movement. Tone reduction techniques utilized during OT sessions result in only a temporary reduction in tone. Recommend pt meet with PM&R doctor to discuss tone management techniques. Pt and caregiver agreeable and caregiver reports plans to make an appointment with Dr. Hetal Hahn. 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 Pt will cont to benefit from outpatient occupational therapy to address decreased functional use of RUE and decreased participation in IADL tasks. . Recommend cont OP OT 2x/wk for 4 weeks. See updated OT poc below.        GOALS:  Patient goal: \" Get right arm working. Move back into home. \"     Short Term Goals:  to be met in 2 weeks (by 6/15/2021)     Pt will demonstrate active assisted right shoulder flexion to 50 degrees or better, gravity reduced, while rating pain < 2/10 for improved ROM for functional task performance. - Progressing, CONT GOAL, 6/1/21     Pt will demonstrate 15o degrees of active assisted elbow flexion against gravity, while rating pain < 0/10 for improved ROM for functional task performance. - Progressing, CONT GOAL, 6/1/21     Pt and caregiver will independently verbalize 2 techniques to obtain optimal positioning of on right vic arm to ensure approximation of glenohumeral joint and decrease risk of complications from subluxation. - CONT GOAL 6/1/21    Pt will participate in assessment of brake reaction time with each foot. - Goal Added, 6/1/21       Long Term Goals:  to be met in 4 weeks (by 6/29/2021)     Pt will demonstrate active assisted right shoulder flexion to 50 degrees, against gravity, while rating pain < 2/10 for improved ROM for functional task performance.   - Progressing, CONT GOAL, 6/1/21     Pt will demonstrate 25o degrees of right active elbow flexion,against gravity, while rating pain < 0/10 for improved ROM for functional task performance. - Progressing, CONT GOAL, 6/1/21     Pt will demonstrate 20o degrees of right active elbow extension while rating pain < 0/10 for improved ROM for functional task performance. - Progressing, CONT GOAL, 6/1/21     Pt will tolerate WBing for 10 min through RUE with elbow and wrist extension for tone management in prep for active assisted movement.  - GOAL MET, 6/1/21      Pt will demo independence with ability to acheive WBing position on RUE with elbow and wrist extension to facilitate participation in [place] at home.        Prognosis: [x]Good   [x]Fair   []Poor    Patient Requires Follow-up:  [x]Yes  []No    Plan: []Plan of care initiated     [x]Continue per plan of care    [] Alter current plan (see comments)    []Hold pending MD visit []Discharge      Time in:       0905   Time Out:   1000     Timed Code Treatment Minutes:55    minutes        Total Treatment Time:   55 minutes           Electronically signed by:  Kb Wong OT, OTR/L

## 2021-06-03 ENCOUNTER — HOSPITAL ENCOUNTER (OUTPATIENT)
Dept: OCCUPATIONAL THERAPY | Age: 66
Setting detail: THERAPIES SERIES
Discharge: HOME | End: 2021-06-03
Payer: MEDICARE

## 2021-06-03 ENCOUNTER — HOSPITAL ENCOUNTER (OUTPATIENT)
Dept: PHYSICAL THERAPY | Age: 66
Setting detail: THERAPIES SERIES
Discharge: HOME | End: 2021-06-03
Payer: MEDICARE

## 2021-06-03 PROCEDURE — 97110 THERAPEUTIC EXERCISES: CPT

## 2021-06-03 PROCEDURE — 97140 MANUAL THERAPY 1/> REGIONS: CPT

## 2021-06-03 PROCEDURE — 97535 SELF CARE MNGMENT TRAINING: CPT

## 2021-06-03 PROCEDURE — 97112 NEUROMUSCULAR REEDUCATION: CPT

## 2021-06-03 NOTE — FLOWSHEET NOTE
reinforcement d/t communication deficits. Discussed PM&R consult with pt's sister in law during phone call on 5/27/21      WBing RUE, fully ext  EOM  8 min     Inflatable arm splint in place to maintain elbow ext    Weight shifting toward right for increased proprioceptive input [] Denies pain with WBing    NMES in place during [address]   Right shoulder movement Scap squeeze, 3x  shld shrug, 3x    Completed in front of mirror for visual feed back  Contraction noted with shrug, slight movement noted  Contraction and movement noted with squeeze      Use of inflatable arm splint to isolate shoulder muscles for facilitating normal movement patterns and reduce compensation however - Difficulty isolating desired muscles - significant compensation noted      EOM - use of table top at shld ht  Active:Horizontal adduction  Active assisted: Horizontal abduction    Supine - Active assisted:   shld flex , 5x   shld ext, 5x  Elbow flex, 5x  Elbow ext, 5x   Reviewed scap squeeze and shld shrug to HEP - recommended in front of mirror    Noted pt initiating movement, even against gravity. Increased effort required - cues for breathing      Demo flex synergy, clemente with attempts at active elbow flex    Noted contraction at right tricep with elbow ext   Card sorting Unable to follow commands for sorting by suit. Able to sort by color with min cues/explaination  Sorting entire deck with only 1 error  Completed while WBing to facilitate weight shift to right   Mirror Therapy Educated pt on purpose of and research re: mirror therapy    Pt participated in mirror therapy for 10 min  Completing exercises with left hand while watching reflection  2-3 cues to cont to watch reflexion  Pt  Will benefit from cont education d/t comprehension may be limited by aphasia.       Arm skate Seated EOM, using rolling/hieght adjustable table    Pushing cones of edge of table (16x)    Demo active movement for: (flexor synergy)  Horizontal adduction, elbow flex and shld ext    Active assist for:  Horizontal abduction, elbow ext, shld flex [] Increased effort required - cues for breathing        THERAPEUTIC EXERCISE and MANUAL TECHNIQUES     AAROM  Attempted active assisted shld flexion    Pt demo shld abduction, elbow/wrist flex with attempts at active movement. PROM RUE  - Supine    Shld flexion to ~100o  Shld abduction to ~90o  ER to ~10-15o  Elbow ext  Wrist ext with increased effort  Digit ext with increased effort   [] Pt's sister in law trained on PROM techniques for shld flex, shld abd and elbow ext. She returned demo and verbalized understanding. Emailed program via [address] [place] [address]. Self ROM shld flexion while supine  Instructed to avoid flex >90  Pt returned demo [] Reviewed for HEP     Scap mobs  - right EOM - All direction    Supine - scap adduction    Side lying - scap depression, upward and downward rotation []       MODALITIES     NMES Right elbow ext  Intensity: 20  Time: 10 min    Right wrist/digit ext  Intensity: 18  Time: 18 min    Attempted grasp/release exercise during NMES to wrist/digit ext however attempts at active movement increasing flexor tone [] Empi unit    Symmetrical/synchronous  10s on/ 5s off  35 PPS  300 wavelength  2.0 ramp time       []       SPLINTING       RHS Pt reports pre-bettina resting hand splint at home as recommended by home OT. Pt reports difficult to don independently []      Home Exercise Program: PROM by caregiver, self ROM, Shoulder/scap exercises with visual feedback    Treatment/Activity Tolerance:    Patients response to treatment:   [x]Patient tolerated treatment well []Patient limited by fatigue   []Patient limited by pain  []Patient limited by other medical complications   []Other:     Assessment:   Rash under right arm appears slightly improved since it was first noted by this writer. Reinforced recommendation to inform medical/nursing staff at [address] [place] [address]. Pt tolerating PROM and WBing on RUE. Pt's sister-in-law reports plans to make appointment with PM&R doctor. Cont per OT poc.      GOALS:  Patient goal: \" Get right arm working. Move back into home. \"     Short Term Goals:  to be met in 2 weeks (by 6/15/2021)     Pt will demonstrate active assisted right shoulder flexion to 50 degrees or better, gravity reduced, while rating pain < 2/10 for improved ROM for functional task performance. - Progressing, CONT GOAL, 6/1/21     Pt will demonstrate 15o degrees of active assisted elbow flexion against gravity, while rating pain < 0/10 for improved ROM for functional task performance. - Progressing, CONT GOAL, 6/1/21     Pt and caregiver will independently verbalize 2 techniques to obtain optimal positioning of on right vic arm to ensure approximation of glenohumeral joint and decrease risk of complications from subluxation. - CONT GOAL 6/1/21    Pt will participate in assessment of brake reaction time with each foot. - Goal Added, 6/1/21       Long Term Goals:  to be met in 4 weeks (by 6/29/2021)     Pt will demonstrate active assisted right shoulder flexion to 50 degrees, against gravity, while rating pain < 2/10 for improved ROM for functional task performance. - Progressing, CONT GOAL, 6/1/21     Pt will demonstrate 25o degrees of right active elbow flexion,against gravity, while rating pain < 0/10 for improved ROM for functional task performance. - Progressing, CONT GOAL, 6/1/21     Pt will demonstrate 20o degrees of right active elbow extension while rating pain < 0/10 for improved ROM for functional task performance. - Progressing, CONT GOAL, 6/1/21     Pt will tolerate WBing for 10 min through RUE with elbow and wrist extension for tone management in prep for active assisted movement.  - GOAL MET, 6/1/21      Pt will demo independence with ability to acheive WBing position on RUE with elbow and wrist extension to facilitate participation in [place] at home. - Goal Added 6/1/21       Prognosis: [x]Good

## 2021-06-07 NOTE — PROGRESS NOTES
[facility], [place] [address] [address], [address]   Phone: [phone]  Fax [phone]      Referral Request       2021  To:    Dr. Delfino Carlin     Patient: Hien Christopher  : 1955  MRN: [id number]  Evaluation Date: 2021      Diagnosis Information: CVA/I63.9, Right hemiparesis, Right side neglect, Expressive aphasia          Dear Dr. Gogo Jenkins demonstrates hypertonicity in his right upper and lower extremities. His progress in therapies is limited by the flexor tone that increases with attempts at active movement. Tone reduction techniques utilized during therapy sessions result in only a temporary reduction in tone. Hien Christopher would benefit from a consult with a PM&R doctor to discuss tone management techniques. Hien Christopher and his caregiver expressed an interest in making an appointment with Dr. Maria G Nam. Please make referral for PM&R consult if you feel it will benefit Mr. Shelley Milan. Thank you!       Electronically signed: Ely Sharma OT, OTR/L

## 2021-06-08 ENCOUNTER — HOSPITAL ENCOUNTER (OUTPATIENT)
Dept: PHYSICAL THERAPY | Age: 66
Setting detail: THERAPIES SERIES
Discharge: HOME | End: 2021-06-08
Payer: MEDICARE

## 2021-06-08 ENCOUNTER — HOSPITAL ENCOUNTER (OUTPATIENT)
Dept: OCCUPATIONAL THERAPY | Age: 66
Setting detail: THERAPIES SERIES
Discharge: HOME | End: 2021-06-08
Payer: MEDICARE

## 2021-06-08 ENCOUNTER — HOSPITAL ENCOUNTER (OUTPATIENT)
Dept: SPEECH THERAPY | Age: 66
Setting detail: THERAPIES SERIES
Discharge: HOME | End: 2021-06-08
Payer: MEDICARE

## 2021-06-08 PROCEDURE — 97032 APPL MODALITY 1+ESTIM EA 15: CPT

## 2021-06-08 PROCEDURE — 92507 TX SP LANG VOICE COMM INDIV: CPT

## 2021-06-08 PROCEDURE — 97110 THERAPEUTIC EXERCISES: CPT

## 2021-06-08 PROCEDURE — 97140 MANUAL THERAPY 1/> REGIONS: CPT

## 2021-06-08 PROCEDURE — 97112 NEUROMUSCULAR REEDUCATION: CPT

## 2021-06-08 NOTE — FLOWSHEET NOTE
[facility], [facility], [address] [place] [facility], [address]   Phone: [phone]  Fax [phone]      Outpatient Occupational Therapy     [x] Daily Treatment Note   [] Progress Note   [] Discharge Note    Date:  6/8/2021    Patient Name:  Mis Erickson         YOB: 1955    Medical Diagnosis/ICD10[de-identified]   CVA/I63.9, Right hemiparesis, Right side neglect, Expressive aphasia  Treatment Diagnosis: Impaired self care status due to decreased functional use of RUE/RLE  Referring Physician: Dr. Karen Cullen    Referral Date:  3/15/2021  Onset Date:  Nov 2019  Visits Allowed/Insurance/Certification Information:   Medicare A/B, AARP  Restrictions/Precautions Right vic, aphasia    Plan of care sent to provider:    [x]Faxed (date:   ) []Co-signature    (attempts: 1[x] 2 []3[])        Plan of care signed:    [x]Yes (date:5/5/21; 6/2/21  )           []No       Progress Note covers period from (if applicable):    [x]NA    []From 6/3/21         To         Next Progress Note due:   6/29/2021    Visit# / total visits:  2/8, 7/8; 7/12    Functional Outcome Measure:   3/25/2021: QuickDASH: 42    Subjective: Pt arrived alone. Pt ambulating with SBQC.       Pain level: denies    Plan for Next Session:  WBing  NMES - elbow/wrist ext  PROM in supine    Self ROM  Mirror therapy    Objective:          Assessment of UE tone/spasticity:    Date: 3/25/2021 5/4/21   6/1/21  6/8/21   Shoulder flexors 2 1  2  1+    Internal rotators 1  1+ 1+  1+    External rotators 0  0 0   1   Elbow flexors 1+  (clonus)  1  (at end range)  1+ 1+   Elbow extensor 1+ 1+   1  1   Supinators 0  0  0  0   Pronators 1+  1  1+  1+   Wrist flexors 2  2  3  2   Wrist extensors 0 0   0  0   Digit flexors 2-3  2 2  3    Digit extensors 0  0  0  0         Modified Rian Scale  0    No increase in muscle tone  1    Slight increase in muscle tone, manifested by a catch and release or by minimal        resistance at the end of the range of motion when the affected part(s) is moved in        flexion or extension  1+ Slight increase in muscle tone, manifested by a catch, followed by minimal        resistance throughout the remainder (less than half) of the ROM  2    More marked increase in muscle tone through most of the ROM, but        affected part(s) easily moved  3    Considerable increase in muscle tone, passive movement difficult  4    Affected part(s) rigid in flexion or extension       Exercises:    Exercises in bold performed in department today. Items not bolded are carried forward from prior visits for continuity of the record. Exercise/Equipment Resistance/Repetitions HEP Other comments      ADL/IADL TRAINING       LB dressing Doff/don right shoe and AFO with modified independence as pt required increased time    [] Increased effort this date d/t new shoes    Per PT, pt no longer requires AFO 4/15/21     Functional Mobility Pt ambulating with SBQC, w/o AFO - with mod ind  OT<>bathroom  OT>parking lot []    ADLs Pt performed toileting tasks with modified indepedence    Modified indep with dof/don overhead shirts x2  Standing to dof, seated to don []      Brake reaction time RLE: 20.0 seconds  LLE: average of 0.804 seconds    OT expressed concerns about processing and response time and explained that in-clinic assessment would be limited by aphasia [] If pt wishes to pursue a driving, he will require hand controls and may need to learn to use LLE to operate brake and accelerator.     Recommended in-car driving training at [address] and THERAPEUTIC ACTIVITY        Education on stroke recovery Role of OT  Tone and management of tone  Typical progression of UE function after stoke     Reviewed recommendation for meeting with PM&R MD to discuss tone management options   [] Pt's sister-in-law verbalized understanding   Pt will benefit from reinforcement d/t communication deficits. Discussed PM&R consult with pt's sister in law during phone call on 5/27/21      WBing RUE, fully ext  EOM  12 min     Inflatable arm splint in place to maintain elbow ext    Weight shifting toward right for increased proprioceptive input [] Denies pain with WBing    NMES in place during [address]   Right shoulder movement Scap squeeze, 3x  shld shrug, 3x    Completed in front of mirror for visual feed back  Contraction noted with shrug, slight movement noted  Contraction and movement noted with squeeze      Use of inflatable arm splint to isolate shoulder muscles for facilitating normal movement patterns and reduce compensation however - Difficulty isolating desired muscles - significant compensation noted      EOM - use of table top at shld ht  Active:Horizontal adduction  Active assisted: Horizontal abduction    Supine - Active assisted:   shld flex , 5x   shld ext, 5x  Elbow flex, 5x  Elbow ext, 5x   Reviewed scap squeeze and shld shrug to HEP - recommended in front of mirror    Noted pt initiating movement, even against gravity. Increased effort required - cues for breathing      Demo flex synergy, clemente with attempts at active elbow flex    Noted contraction at right tricep with elbow ext   Card sorting Unable to follow commands for sorting by suit. Able to sort by color with min cues/explaination  Sorting entire deck with only 1 error  Completed while WBing to facilitate weight shift to right   Mirror Therapy Educated pt on purpose of and research re: mirror therapy    Pt participated in mirror therapy for 10 min  Completing exercises with left hand while watching reflection  2-3 cues to cont to watch reflexion  Pt  Will benefit from cont education d/t comprehension may be limited by aphasia.       Arm skate Seated EOM, using rolling/hieght adjustable table    Pushing cones of edge of table (16x)    Demo active movement for: (flexor synergy)  Horizontal adduction, elbow flex and shld ext    Active assist for:  Horizontal abduction, elbow ext, shld flex [] Increased effort required - cues for breathing        THERAPEUTIC EXERCISE and MANUAL TECHNIQUES     AAROM  Attempted active assisted shld flexion    Pt demo shld abduction, elbow/wrist flex with attempts at active movement. PROM RUE  - Supine    Shld flexion to ~100o  Shld abduction to ~90o  ER to ~10-15o  Elbow ext  Wrist ext with increased effort  Digit ext with increased effort   [] Pt's sister in law trained on PROM techniques for shld flex, shld abd and elbow ext. She returned demo and verbalized understanding. Emailed program via [address] [place] [address]. Self ROM shld flexion while supine  Instructed to avoid flex >90  Pt returned demo [] Reviewed for HEP     Scap mobs  - right EOM - All direction    Supine - scap adduction    Side lying - scap depression, upward and downward rotation []       MODALITIES     NMES Right elbow ext  Intensity: 20  Time: 10 min    Right wrist/digit ext  Intensity: 17  Time: 15 min    Attempted grasp/release exercise during NMES to wrist/digit ext however attempts at active movement increasing flexor tone [] Empi unit    Symmetrical/synchronous  10s on/ 5s off  35 PPS  300 wavelength  2.0 ramp time       []       SPLINTING       RHS Pt reports pre-bettina resting hand splint at home as recommended by home OT. Pt reports difficult to don independently []      Home Exercise Program: PROM by caregiver, self ROM, Shoulder/scap exercises with visual feedback    Treatment/Activity Tolerance:    Patients response to treatment:   [x]Patient tolerated treatment well []Patient limited by fatigue   []Patient limited by pain  []Patient limited by other medical complications   []Other:     Assessment:   Pt tolerating PROM/AAROM and WBing on RUE. Improved response to NMES after WBing. Cont per OT poc.      GOALS:  Patient goal: \" Get right arm working. Move back into home.  \"     Short Term Goals:  to be met in 2 weeks (by 6/15/2021)     Pt will demonstrate active assisted right shoulder flexion to 50 degrees or better, gravity reduced, while rating pain < 2/10 for improved ROM for functional task performance. - Progressing, CONT GOAL, 6/1/21     Pt will demonstrate 15o degrees of active assisted elbow flexion against gravity, while rating pain < 0/10 for improved ROM for functional task performance. - Progressing, CONT GOAL, 6/1/21     Pt and caregiver will independently verbalize 2 techniques to obtain optimal positioning of on right vic arm to ensure approximation of glenohumeral joint and decrease risk of complications from subluxation. - CONT GOAL 6/1/21    Pt will participate in assessment of brake reaction time with each foot. - Goal Added, 6/1/21       Long Term Goals:  to be met in 4 weeks (by 6/29/2021)     Pt will demonstrate active assisted right shoulder flexion to 50 degrees, against gravity, while rating pain < 2/10 for improved ROM for functional task performance. - Progressing, CONT GOAL, 6/1/21     Pt will demonstrate 25o degrees of right active elbow flexion,against gravity, while rating pain < 0/10 for improved ROM for functional task performance. - Progressing, CONT GOAL, 6/1/21     Pt will demonstrate 20o degrees of right active elbow extension while rating pain < 0/10 for improved ROM for functional task performance. - Progressing, CONT GOAL, 6/1/21     Pt will tolerate WBing for 10 min through RUE with elbow and wrist extension for tone management in prep for active assisted movement.  - GOAL MET, 6/1/21      Pt will demo independence with ability to acheive WBing position on RUE with elbow and wrist extension to facilitate participation in [place] at [place]. - Goal Added 6/1/21       Prognosis: [x]Good   [x]Fair   []Poor    Patient Requires Follow-up:  [x]Yes  []No    Plan: []Plan of care initiated     [x]Continue per plan of care    [] Alter current plan (see comments)    []Hold pending MD visit []Discharge      ---  ---- --- ---- --- ---- --- ---- --- ---- --- ---- --- ---- --- ---- --- ---- --- --- ---    Therapeutic Exercise/Home Exercise Program:    minutes    Therapeutic Activity:   minutes    ADL Training:    minutes      Neuromuscular Re-Education:  30 minutes      Manual Therapy: 15   minutes    Splintin minutes     Modalities:   15 minutes    Time in:       7475   Time Out:   8242     Timed Code Treatment Minutes:60   minutes        Total Treatment Time:   60 minutes           Electronically signed by:  Luis Kwon OT, OTR/L

## 2021-06-08 NOTE — FLOWSHEET NOTE
Minisquat  cueing     Other Therapeutic Activities:      Manual Treatments:         Modalities:      Test/Measurements:         ASSESSMENT:    Pt tolerated session well. Improving with standing exercise. Patient between visit retention was significantly improved this visit, needed minimal cueing to remember HEP activities. Functional activities continued focusing on increasing push off and Left leg step length to focus on patient goal of improved gait speed and control. Patient's step length and height was improved for functional activity of stepping over objects this session after continued focus. Treatment/Activity Tolerance:   [x]Patient tolerated treatment well [] Patient limited by fatique  []Patient limited by pain [] Patient limited by other medical complications  [] Other:     Goals:    Short term goals  Time Frame for Short term goals: 6 weeks  Short term goal 1: pt will be indep in HEP  Short term goal 2: pt will increase R DF strength by 1/3 muscle grade  Short term goal 3: pt will ambulate with increased glut activation RLE  Short term goal 4: pt will decrease TUG time 10 sec  Short term goal 5: pt will increase LEFS score by 10 points to hit MCID           Plan: [x] Continue per plan of care [] Alter current plan (see comments)   [] Plan of care initiated [] Hold pending MD visit [] Discharge      Plan for Next Session:      Re-Certification Due Date:         Signature:  Lynsey Hewitt PT , PT                      Chapincito Cole, SPT  Physical therapist was present, directed the patient's care, made skilled judgement, and was responsible for the assessment and treatment of the patient.

## 2021-06-08 NOTE — FLOWSHEET NOTE
short sentences, in response to therapy tasks, with 70% accuracy, mod-max cues:  - GOAL MET    Goal 7: The pt will write single basic words with 50% accuracy, mod-max cues:  - DISCONTINUE THIS GOAL DUE TO LACK OF PROGRESS      Other Treatments:      Assessment:   Continued overall progress is noted. Plan:   Continued Speech Therapy services 2 x week for 6 weeks. Total Treatment Time / Charges     Time in Time out Total Time / units   Cognitive Tx         Speech Tx 1230 1315 45 min / 1 unit   Dysphagia Tx           Signature:     Elgin Mondragon MA, [address] [id number]  Speech-Language Pathologist  Phone #: [phone]  Fax #: [phone]

## 2021-06-10 ENCOUNTER — HOSPITAL ENCOUNTER (OUTPATIENT)
Dept: PHYSICAL THERAPY | Age: 66
Setting detail: THERAPIES SERIES
Discharge: HOME | End: 2021-06-10
Payer: MEDICARE

## 2021-06-10 ENCOUNTER — HOSPITAL ENCOUNTER (OUTPATIENT)
Dept: SPEECH THERAPY | Age: 66
Setting detail: THERAPIES SERIES
Discharge: HOME | End: 2021-06-10
Payer: MEDICARE

## 2021-06-10 ENCOUNTER — HOSPITAL ENCOUNTER (OUTPATIENT)
Dept: OCCUPATIONAL THERAPY | Age: 66
Setting detail: THERAPIES SERIES
Discharge: HOME | End: 2021-06-10
Payer: MEDICARE

## 2021-06-10 PROCEDURE — 92507 TX SP LANG VOICE COMM INDIV: CPT

## 2021-06-10 PROCEDURE — 97112 NEUROMUSCULAR REEDUCATION: CPT

## 2021-06-10 PROCEDURE — 97032 APPL MODALITY 1+ESTIM EA 15: CPT

## 2021-06-10 PROCEDURE — 97140 MANUAL THERAPY 1/> REGIONS: CPT

## 2021-06-10 PROCEDURE — 97110 THERAPEUTIC EXERCISES: CPT

## 2021-06-10 NOTE — FLOWSHEET NOTE
[facility] Therapy, [facility],  [address]  [place], [address]   Phone: [phone]  Fax [phone]      Speech-Language Pathology  Daily Treatment Note    Date:  6/10/2021    Patient Name:  Wm Bellamy    :  1955  MRN: [id number]  Restrictions/Precautions:  Limited communication ability due to significant Aphasia  Treatment Diagnosis:    Codes     Aphasia as late effect of cerebrovascular accident (CVA)    -  Primary   Insurance/Certification information: Medicare A & B; [address]   Referring Physician:  Juan Posey. Jesica Salgado MD  Plan of care signed (Y/N):  Y  Visit# / total visits:     Pain level: No reported or suspected pain noted     Progress Note: []  Yes  [x]  No  Next due by: Visit #10: 2/10 or 21     Subjective: The pt was in overall good spirits. Significant frustration still observed with speech difficulty occurs due to his Aphasia. Objective:   Short-term Goals:  Goal 1: NEW GOAL: The pt will say indivdual phrase and short sentences, in response to therapy tasks, with 90% accuracy, mod cues:  - 89% (32/36), mod cues; targeted via picture descriptions and open ended questions     Goal 2: NEW GOAL: The pt will complete basic confrontational and responsive naming with 90% accuracy, min cues  - Confrontational: Did not target   - Responsive: 70% (7/10), min cues    Goal 3: NEW GOAL: The pt will recognize spoken letters from a field of 5 with 95% accuracy, min-mod cues:  - Goal targeted indirectly through other treatment tasks. Goal 4: The pt will use an AAC speech generating device to answer given basic questions with 80% accuracy, mod-max cues:  - GOAL DISCONTINUED. The pt has not been using or bring his iPad and has not been wanting to use AAC communication due to improved verbal communication. Goal 5:  The pt will complete basic confrontational and responsive naming with 75% accuracy, mod-max cues:  -  GOAL MET    Goal 6: The pt will say indivdual phrase and short sentences, in response to therapy tasks, with 70% accuracy, mod-max cues:  - GOAL MET    Goal 7: The pt will write single basic words with 50% accuracy, mod-max cues:  - DISCONTINUE THIS GOAL DUE TO LACK OF PROGRESS      Other Treatments:      Assessment:   Noted improved overall spoken verbal output this date. The session mostly focused on extending verbal utterances. Plan:   Continued Speech Therapy services 2 x week for 6 weeks. Total Treatment Time / Charges     Time in Time out Total Time / units   Cognitive Tx         Speech Tx 1045 1130 45 min / 1 unit   Dysphagia Tx           Signature:     Claudia Gagnon MA, [address] [id number]  Speech-Language Pathologist  Phone #: [phone]  Fax #: [phone]

## 2021-06-10 NOTE — FLOWSHEET NOTE
[facility] Therapy, [facility], [address] [place] [place], [address]   Phone: [phone]  Fax [phone]      Outpatient Occupational Therapy     [x] Daily Treatment Note   [] Progress Note   [] Discharge Note    Date:  6/10/2021    Patient Name:  Elaine Mendoza         YOB: 1955    Medical Diagnosis/ICD10[de-identified]   CVA/I63.9, Right hemiparesis, Right side neglect, Expressive aphasia  Treatment Diagnosis: Impaired self care status due to decreased functional use of RUE/RLE  Referring Physician: Dr. Litzy Townsend    Referral Date:  3/15/2021  Onset Date:  Nov 2019  Visits Allowed/Insurance/Certification Information:   Medicare A/B, AARP  Restrictions/Precautions Right vic, aphasia    Plan of care sent to provider:    [x]Faxed (date:   ) []Co-signature    (attempts: 1[x] 2 []3[])        Plan of care signed:    [x]Yes (date:5/5/21; 6/2/21  )           []No       Progress Note covers period from (if applicable):    [x]NA    []From 6/3/21         To         Next Progress Note due:   6/29/2021    Visit# / total visits:  3/8, 7/8; 7/12    Functional Outcome Measure:   3/25/2021: QuickDASH: 42    Subjective: Pt arrived alone. Pt ambulating with SBQC.       Pain level: denies    Plan for Next Session:  WBing  NMES - elbow/wrist ext  PROM in supine    Self ROM  Mirror therapy    Objective:          Assessment of UE tone/spasticity:    Date: 3/25/2021 5/4/21   6/1/21  6/8/21   Shoulder flexors 2 1  2  1+    Internal rotators 1  1+ 1+  1+    External rotators 0  0 0   1   Elbow flexors 1+  (clonus)  1  (at end range)  1+ 1+   Elbow extensor 1+ 1+   1  1   Supinators 0  0  0  0   Pronators 1+  1  1+  1+   Wrist flexors 2  2  3  2   Wrist extensors 0 0   0  0   Digit flexors 2-3  2 2  3    Digit extensors 0  0  0  0         Modified Rian Scale  0    No increase in muscle tone  1    Slight increase in muscle tone, manifested by a catch and release or by adduction, elbow flex and shld ext    Active assist for:  Horizontal abduction, elbow ext, shld flex [] Increased effort required - cues for breathing        THERAPEUTIC EXERCISE and MANUAL TECHNIQUES     AAROM  Attempted active assisted shld flexion    Pt demo shld abduction, elbow/wrist flex with attempts at active movement. PROM RUE  - Supine    Shld flexion to ~100o  Shld abduction to ~90o  ER to ~10-15o  Elbow ext  Wrist ext with increased effort  Digit ext with increased effort   [] Pt's sister in law trained on PROM techniques for shld flex, shld abd and elbow ext. She returned demo and verbalized understanding. Emailed program via [employer]. Self ROM shld flexion while supine  Instructed to avoid flex >90  Pt returned demo [] Reviewed for HEP     Scap mobs  - right EOM - All direction    Supine - scap adduction    Side lying - scap depression, upward and downward rotation []       MODALITIES     NMES Right elbow ext  Intensity: 18  Time: 10 min    Right wrist/digit ext  Intensity: 20  Time: 10 min    Attempted grasp/release exercise during NMES to wrist/digit ext however attempts at active movement increasing flexor tone [] Empi unit    Symmetrical/synchronous  10s on/ 5s off  35 PPS  300 wavelength  2.0 ramp time       []       SPLINTING       RHS Pt reports pre-bettina resting hand splint at home as recommended by home OT. Pt reports difficult to don independently []      Home Exercise Program: PROM by caregiver, self ROM, Shoulder/scap exercises with visual feedback    Treatment/Activity Tolerance:    Patients response to treatment:   [x]Patient tolerated treatment well []Patient limited by fatigue   []Patient limited by pain  []Patient limited by other medical complications   []Other:     Assessment:   Pt tolerating PROM/AAROM and WBing on RUE. Improved response to NMES after WBing. Demo decreased tone in RUE after WBing, however flexor tone returns with attempts at active movement.  Cont per OT poc.      GOALS:  Patient goal: \" Get right arm working. Move back into home. \"     Short Term Goals:  to be met in 2 weeks (by 6/15/2021)     Pt will demonstrate active assisted right shoulder flexion to 50 degrees or better, gravity reduced, while rating pain < 2/10 for improved ROM for functional task performance. - Progressing, CONT GOAL, 6/1/21     Pt will demonstrate 15o degrees of active assisted elbow flexion against gravity, while rating pain < 0/10 for improved ROM for functional task performance. - Progressing, CONT GOAL, 6/1/21     Pt and caregiver will independently verbalize 2 techniques to obtain optimal positioning of on right vic arm to ensure approximation of glenohumeral joint and decrease risk of complications from subluxation. - CONT GOAL 6/1/21    Pt will participate in assessment of brake reaction time with each foot. - Goal Added, 6/1/21       Long Term Goals:  to be met in 4 weeks (by 6/29/2021)     Pt will demonstrate active assisted right shoulder flexion to 50 degrees, against gravity, while rating pain < 2/10 for improved ROM for functional task performance. - Progressing, CONT GOAL, 6/1/21     Pt will demonstrate 25o degrees of right active elbow flexion,against gravity, while rating pain < 0/10 for improved ROM for functional task performance. - Progressing, CONT GOAL, 6/1/21     Pt will demonstrate 20o degrees of right active elbow extension while rating pain < 0/10 for improved ROM for functional task performance. - Progressing, CONT GOAL, 6/1/21     Pt will tolerate WBing for 10 min through RUE with elbow and wrist extension for tone management in prep for active assisted movement.  - GOAL MET, 6/1/21      Pt will demo independence with ability to acheive WBing position on RUE with elbow and wrist extension to facilitate participation in [place] at home. - Goal Added 6/1/21       Prognosis: [x]Good   [x]Fair   []Poor    Patient Requires Follow-up:  [x]Yes  []No    Plan: []Plan of

## 2021-06-10 NOTE — FLOWSHEET NOTE
[facility], [place] [address] [address]  [address] [place] [address], [address] [place] [address]  Phone: [phone]  Fax [phone]    Physical Therapy Daily Treatment Note    Date:  6/10/2021    Patient Name:  Marshall Jones    :  1955  MRN: [id number]  Restrictions/Precautions:    Medical/Treatment Diagnosis Information:  · Diagnosis: CVA, R hemiparesis  Insurance/Certification information:  PT Insurance Information: Medicare  Physician Information:  Referring Practitioner: Ashu Walker MD  Plan of care signed (Y/N):  N  Visit# / total visits:      G-Code (if applicable):          LEFS     Medicare Cap (if applicable):  5763 = total amount used, updated 6/10/2021    Time in:   9:00  Timed Treatment: 45  Total Treatment Time:  45  ________________________________________________________________________________________    Pain Level:    /10  SUBJECTIVE: Pt reports minor L glute pain has completely resolved. OBJECTIVE: [id number]    Exercise/Equipment Resistance/Repetitions Other comments   NuStep 5 min           Supine leg press  Supine hip flexion 3 min  3 min Manual resistance   Clams #1 2x10 BLE    Bridge    Single limb x15  x15    PHE    Bent knee x10  x10 RLE In side-lying this session   PKF 2x5 RLE In sidelying this session   Supine clams x15 yellow   Supine hip neuro re-ed 3 min RLE    LAQ x10 RLE With TB assist   Seated hamstring curls x10 With maxislide        Side-lying knee extension/flexion x10 RLE    Hooklying ADD ball squeeze    With hip flexion   x10      Backwards walking alternating with resistance walking in parallel bars 10 laps Red band for forwards walking, 3 # weight removed CHCF through to increase stride length. Gait training 4 laps Focusing on pushoff and stride length.    Standing Hip Flexion with facilitation x20 4#, initially, after 10 reps, manual facilitation   Slant board 3x30\"    Seated QS 10x5\" RLE With hamstring stretch

## 2021-06-15 ENCOUNTER — HOSPITAL ENCOUNTER (OUTPATIENT)
Dept: SPEECH THERAPY | Age: 66
Setting detail: THERAPIES SERIES
Discharge: HOME | End: 2021-06-15
Payer: MEDICARE

## 2021-06-15 ENCOUNTER — HOSPITAL ENCOUNTER (OUTPATIENT)
Dept: PHYSICAL THERAPY | Age: 66
Setting detail: THERAPIES SERIES
Discharge: HOME | End: 2021-06-15
Payer: MEDICARE

## 2021-06-15 ENCOUNTER — HOSPITAL ENCOUNTER (OUTPATIENT)
Dept: OCCUPATIONAL THERAPY | Age: 66
Setting detail: THERAPIES SERIES
Discharge: HOME | End: 2021-06-15
Payer: MEDICARE

## 2021-06-15 PROCEDURE — 97140 MANUAL THERAPY 1/> REGIONS: CPT

## 2021-06-15 PROCEDURE — 97032 APPL MODALITY 1+ESTIM EA 15: CPT

## 2021-06-15 PROCEDURE — 97110 THERAPEUTIC EXERCISES: CPT

## 2021-06-15 PROCEDURE — 92507 TX SP LANG VOICE COMM INDIV: CPT

## 2021-06-15 PROCEDURE — 97112 NEUROMUSCULAR REEDUCATION: CPT

## 2021-06-15 NOTE — FLOWSHEET NOTE
[facility], [facility], [address] [place] [address]  Phone: [phone]  Fax [phone]    Physical Therapy Daily Treatment Note    Date:  6/15/2021    Patient Name:  Tariq Ojeda    :  1955  MRN: [id number]  Restrictions/Precautions:    Medical/Treatment Diagnosis Information:  · Diagnosis: CVA, R hemiparesis  Insurance/Certification information:  PT Insurance Information: Medicare  Physician Information:  Referring Practitioner: Yaquelin Sher MD  Plan of care signed (Y/N):  N  Visit# / total visits:      G-Code (if applicable):          LEFS     Medicare Cap (if applicable):  6958 = total amount used, updated 6/15/2021    Time in:   9:00  Timed Treatment: 45  Total Treatment Time:  45  ________________________________________________________________________________________    Pain Level:    /10  SUBJECTIVE: Pt reports nothing new at this time. Feeling fine. OBJECTIVE: [id number]    Exercise/Equipment Resistance/Repetitions Other comments   NuStep 5 min           Supine leg press  Supine hip flexion 3 min  3 min Manual resistance   Clams #1 2x10 BLE    Bridge    Single limb x15  x15    PHE    Bent knee x10  x10 RLE In side-lying this session   PKF  In sidelying this session   Supine clams x15 yellow   Supine hip neuro re-ed 3 min RLE    LAQ x10 RLE With TB assist   Seated hamstring curls x10 With maxislide        Side-lying knee extension/flexion x10 RLE    Hooklying ADD ball squeeze    With hip flexion   x10    Red band for forwards walking, 3 # weight removed FPC through to increase stride length. Focusing on pushoff and stride length.    Standing Hip Flexion with facilitation x20  manual facilitation   Slant board 3x30\"    With hamstring stretch   TG 3 min    Standing step over object 3 min BLE    Minisquat x10 cueing     Other Therapeutic Activities:      Manual Treatments:         Modalities: Test/Measurements:         ASSESSMENT:    Pt tolerated session well. Improving with standing exercise. Functional activities continue focusing on increasing push off and Left leg step length to focus on patient goal of improved gait speed and control. Patient stride length was improved in activity. Today' treatment focused on ensuring home exercise program could be performed with assistance at home. Treatment/Activity Tolerance:   [x]Patient tolerated treatment well [] Patient limited by fatique  []Patient limited by pain [] Patient limited by other medical complications  [] Other:     Goals:    Short term goals  Time Frame for Short term goals: 6 weeks  Short term goal 1: pt will be indep in HEP  Short term goal 2: pt will increase R DF strength by 1/3 muscle grade  Short term goal 3: pt will ambulate with increased glut activation RLE  Short term goal 4: pt will decrease TUG time 10 sec  Short term goal 5: pt will increase LEFS score by 10 points to hit MCID           Plan: [x] Continue per plan of care [] Alter current plan (see comments)   [] Plan of care initiated [] Hold pending MD visit [] Discharge      Plan for Next Session:  Biomechanical correction of problems as they present. Facilitate correct muscle firing patterns and activation with appropriate activities. Progress patient as tolerated. Re-Certification Due Date:         Signature:  Vivian Bruno, PT , PT                      Ayah Alvarez, VALERIA  Physical therapist was present, directed the patient's care, made skilled judgement, and was responsible for the assessment and treatment of the patient.

## 2021-06-15 NOTE — FLOWSHEET NOTE
[facility], [facility],  Miriam Bruner  [place], [address]   Phone: [phone]  Fax [phone]      Speech-Language Pathology  Daily Treatment Note    Date:  6/15/2021    Patient Name:  Ludy Tyson    :  1955  MRN: [id number]  Restrictions/Precautions:  Limited communication ability due to significant Aphasia  Treatment Diagnosis:    Codes     Aphasia as late effect of cerebrovascular accident (CVA)    -  Primary   Insurance/Certification information: Medicare A & B; [address]   Referring Physician:  Diane Orellana. Cody Díaz MD  Plan of care signed (Y/N):  Y  Visit# / total visits:     Pain level: No reported or suspected pain noted     Progress Note: []  Yes  [x]  No  Next due by: Visit #10: 3/10 or 21     Subjective: The pt was in overall good spirits. Objective:   Short-term Goals:  Goal 1: NEW GOAL: The pt will say indivdual phrase and short sentences, in response to therapy tasks, with 90% accuracy, mod cues:  - 90% (), mod cues; targeted via picture descriptions and open ended questions     Goal 2: NEW GOAL: The pt will complete basic confrontational and responsive naming with 90% accuracy, min cues  - Confrontational: 38%, min cues  - Responsive: Did not target    Goal 3: NEW GOAL: The pt will recognize spoken letters from a field of 5 with 95% accuracy, min-mod cues:  - 33%, min-mod cues    Goal 4: The pt will use an AAC speech generating device to answer given basic questions with 80% accuracy, mod-max cues:  - GOAL DISCONTINUED. The pt has not been using or bring his iPad and has not been wanting to use AAC communication due to improved verbal communication. Goal 5:  The pt will complete basic confrontational and responsive naming with 75% accuracy, mod-max cues:  -  GOAL MET    Goal 6: The pt will say indivdual phrase and short sentences, in response to therapy tasks, with 70% accuracy, mod-max cues:  - GOAL MET    Goal 7: The pt will write single basic words with 50% accuracy, mod-max cues:  - DISCONTINUE THIS GOAL DUE TO LACK OF PROGRESS      Other Treatments:      Assessment:   Continued improvements in spoken output. Plan:   Continued Speech Therapy services 2 x week for 6 weeks. Total Treatment Time / Charges     Time in Time out Total Time / units   Cognitive Tx         Speech Tx 1045 1130 45 min / 1 unit   Dysphagia Tx           Signature:     Dariel Perkins MA, Kaitlin Back  [id number]  Speech-Language Pathologist  Phone #: [phone]  Fax #: [phone]

## 2021-06-15 NOTE — FLOWSHEET NOTE
[facility], [facility], [address] Tonie Erick Kristyn [facility], [address]   Phone: [phone]  Fax [phone]      Outpatient Occupational Therapy     [x] Daily Treatment Note   [] Progress Note   [] Discharge Note    Date:  6/15/2021    Patient Name:  Leonela Pederson         YOB: 1955    Medical Diagnosis/ICD10[de-identified]   CVA/I63.9, Right hemiparesis, Right side neglect, Expressive aphasia  Treatment Diagnosis: Impaired self care status due to decreased functional use of RUE/RLE  Referring Physician: Dr. Sandra Houser    Referral Date:  3/15/2021  Onset Date:  Nov 2019  Visits Allowed/Insurance/Certification Information:   Medicare A/B, AARP  Restrictions/Precautions Right vic, aphasia    Plan of care sent to provider:    [x]Faxed (date:   ) []Co-signature    (attempts: 1[x] 2 []3[])        Plan of care signed:    [x]Yes (date:5/5/21; 6/2/21  )           []No       Progress Note covers period from (if applicable):    [x]NA    []From 6/3/21         To         Next Progress Note due:   6/29/2021    Visit# / total visits:  4/8, 7/8; 7/12    Functional Outcome Measure:   3/25/2021: QuickDASH: 42    Subjective: Pt arrived alone. Pt ambulating with SBQC.       Pain level: denies    Plan for Next Session:  WBing  NMES - elbow/wrist ext  PROM in supine    Self ROM  Mirror therapy    Objective:          Assessment of UE tone/spasticity:    Date: 3/25/2021 5/4/21   6/1/21  6/8/21   Shoulder flexors 2 1  2  1+    Internal rotators 1  1+ 1+  1+    External rotators 0  0 0   1   Elbow flexors 1+  (clonus)  1  (at end range)  1+ 1+   Elbow extensor 1+ 1+   1  1   Supinators 0  0  0  0   Pronators 1+  1  1+  1+   Wrist flexors 2  2  3  2   Wrist extensors 0 0   0  0   Digit flexors 2-3  2 2  3    Digit extensors 0  0  0  0         Modified Rian Scale  0    No increase in muscle tone  1    Slight increase in muscle tone, manifested by a catch and release or by minimal        resistance at the end of the range of motion when the affected part(s) is moved in        flexion or extension  1+ Slight increase in muscle tone, manifested by a catch, followed by minimal        resistance throughout the remainder (less than half) of the ROM  2    More marked increase in muscle tone through most of the ROM, but        affected part(s) easily moved  3    Considerable increase in muscle tone, passive movement difficult  4    Affected part(s) rigid in flexion or extension       Exercises:    Exercises in bold performed in department today. Items not bolded are carried forward from prior visits for continuity of the record. Exercise/Equipment Resistance/Repetitions HEP Other comments      ADL/IADL TRAINING       LB dressing Doff/don right shoe and AFO with modified independence as pt required increased time    [] Increased effort this date d/t new shoes    Per PT, pt no longer requires AFO 4/15/21     Functional Mobility Pt ambulating with SBQC, w/o AFO - with mod ind  OT<>bathroom  OT>parking lot []    ADLs Pt performed toileting tasks with modified indepedence    Modified indep with dof/don overhead shirts x2  Standing to dof, seated to don []      Brake reaction time RLE: 20.0 seconds  LLE: average of 0.804 seconds    OT expressed concerns about processing and response time and explained that in-clinic assessment would be limited by aphasia [] If pt wishes to pursue a driving, he will require hand controls and may need to learn to use LLE to operate brake and accelerator.     Recommended in-car driving training at [address] and THERAPEUTIC ACTIVITY        Education on stroke recovery Role of OT  Tone and management of tone  Typical progression of UE function after stoke     Reviewed recommendation for meeting with PM&R MD to discuss tone management options   [] Pt's sister-in-law verbalized understanding   Pt will benefit from reinforcement d/t communication deficits. Discussed PM&R consult with pt's sister in law during phone call on 5/27/21      WBing RUE, fully ext  EOM  8 min     Inflatable arm splint in place to maintain elbow ext    Weight shifting toward right for increased proprioceptive input [] Denies pain with WBing    NMES in place on elbow ext during WBing   Right shoulder movement Scap squeeze, 3x  shld shrug, 3x    Completed in front of mirror for visual feed back  Contraction noted with shrug, slight movement noted  Contraction and movement noted with squeeze      Use of inflatable arm splint to isolate shoulder muscles for facilitating normal movement patterns and reduce compensation however - Difficulty isolating desired muscles - significant compensation noted      EOM - use of table top at shld ht  Active:Horizontal adduction  Active assisted: Horizontal abduction    Supine - Active assisted:   shld flex , 5x   shld ext, 5x  Elbow flex, 5x  Elbow ext, 5x   Reviewed scap squeeze and shld shrug to HEP - recommended in front of mirror    Noted pt initiating movement, even against gravity. Increased effort required - cues for breathing      Demo flex synergy, clemente with attempts at active elbow flex    Noted contraction at right tricep with elbow ext   Card sorting Unable to follow commands for sorting by suit. Able to sort by color with min cues/explaination  Sorting entire deck with only 1 error  Completed while WBing to facilitate weight shift to right   Mirror Therapy Educated pt on purpose of and research re: mirror therapy    Pt participated in mirror therapy for 10 min  Completing exercises with left hand while watching reflection  2-3 cues to cont to watch reflexion  Pt  Will benefit from cont education d/t comprehension may be limited by aphasia.       Arm skate Seated EOM, using rolling/hieght adjustable table    Pushing cones of edge of table (16x)    Demo active movement for: (flexor synergy)  Horizontal adduction, elbow flex and shld ext    Active assist for:  Horizontal abduction, elbow ext, shld flex [] Increased effort required - cues for breathing        THERAPEUTIC EXERCISE and MANUAL TECHNIQUES     AAROM  Attempted active assisted shld flexion    Pt demo shld abduction, elbow/wrist flex with attempts at active movement. PROM RUE  - Supine    Shld flexion to ~100o  Shld abduction to ~90o  ER to ~10-15o  Elbow ext  Wrist ext with increased effort  Digit ext with increased effort   [] Pt's sister in law trained on PROM techniques for shld flex, shld abd and elbow ext. She returned demo and verbalized understanding. Emailed program via [address] [place] [address]. Self ROM shld flexion while supine  Instructed to avoid flex >90  Pt returned demo [] Reviewed for HEP     Scap mobs  - right EOM - All direction    Supine - scap adduction    Side lying - scap depression, upward and downward rotation []       MODALITIES     NMES Right elbow ext  Intensity: 18  Time: 15 min    Right wrist/digit ext  Intensity: 17  Time: 15 min    Attempted grasp/release exercise during NMES to wrist/digit ext however attempts at active movement increasing flexor tone [] Empi unit    Symmetrical/synchronous  10s on/ 5s off  35 PPS  300 wavelength  2.0 ramp time       []       SPLINTING       RHS Pt reports pre-bettina resting hand splint at home as recommended by home OT. Pt reports difficult to don independently []      Home Exercise Program: PROM by caregiver, self ROM, Shoulder/scap exercises with visual feedback    Treatment/Activity Tolerance:    Patients response to treatment:   [x]Patient tolerated treatment well []Patient limited by fatigue   []Patient limited by pain  []Patient limited by other medical complications   []Other:     Assessment:   Pt tolerating PROM and WBing on RUE. Improved response to NMES after WBing. Demo decreased tone in RUE after WBing, however flexor tone returns with attempts at active movement.  Cont per OT poc.      GOALS:  Patient goal: \" Get right arm working. Move back into home. \"     Short Term Goals:  to be met in 2 weeks (by 6/15/2021)     Pt will demonstrate active assisted right shoulder flexion to 50 degrees or better, gravity reduced, while rating pain < 2/10 for improved ROM for functional task performance. - Progressing, CONT GOAL, 6/1/21     Pt will demonstrate 15o degrees of active assisted elbow flexion against gravity, while rating pain < 0/10 for improved ROM for functional task performance. - Progressing, CONT GOAL, 6/1/21     Pt and caregiver will independently verbalize 2 techniques to obtain optimal positioning of on right vic arm to ensure approximation of glenohumeral joint and decrease risk of complications from subluxation. - CONT GOAL 6/1/21    Pt will participate in assessment of brake reaction time with each foot. - Goal Added, 6/1/21       Long Term Goals:  to be met in 4 weeks (by 6/29/2021)     Pt will demonstrate active assisted right shoulder flexion to 50 degrees, against gravity, while rating pain < 2/10 for improved ROM for functional task performance. - Progressing, CONT GOAL, 6/1/21     Pt will demonstrate 25o degrees of right active elbow flexion,against gravity, while rating pain < 0/10 for improved ROM for functional task performance. - Progressing, CONT GOAL, 6/1/21     Pt will demonstrate 20o degrees of right active elbow extension while rating pain < 0/10 for improved ROM for functional task performance. - Progressing, CONT GOAL, 6/1/21     Pt will tolerate WBing for 10 min through RUE with elbow and wrist extension for tone management in prep for active assisted movement.  - GOAL MET, 6/1/21      Pt will demo independence with ability to acheive WBing position on RUE with elbow and wrist extension to facilitate participation in [place] at home. - Goal Added 6/1/21       Prognosis: [x]Good   [x]Fair   []Poor    Patient Requires Follow-up:  [x]Yes  []No    Plan: []Plan of care initiated     [x]Continue per plan of care    [] Alter current plan (see comments)    []Hold pending MD visit []Discharge      ---  ---- --- ---- --- ---- --- ---- --- ---- --- ---- --- ---- --- ---- --- ---- --- --- ---    Therapeutic Exercise/Home Exercise Program:    minutes    Therapeutic Activity:   minutes    ADL Training:    minutes      Neuromuscular Re-Education:  15 minutes      Manual Therapy: 15   minutes    Splintin minutes     Modalities:   15 minutes    Time in:       1000   Time Out:   1115     Timed Code Treatment Minutes:45   minutes        Total Treatment Time:   45 minutes           Electronically signed by:  Juwan Meehan, OT, OTR/L

## 2021-06-17 ENCOUNTER — HOSPITAL ENCOUNTER (OUTPATIENT)
Dept: SPEECH THERAPY | Age: 66
Setting detail: THERAPIES SERIES
Discharge: HOME | End: 2021-06-17
Payer: MEDICARE

## 2021-06-17 ENCOUNTER — HOSPITAL ENCOUNTER (OUTPATIENT)
Dept: OCCUPATIONAL THERAPY | Age: 66
Setting detail: THERAPIES SERIES
Discharge: HOME | End: 2021-06-17
Payer: MEDICARE

## 2021-06-17 ENCOUNTER — HOSPITAL ENCOUNTER (OUTPATIENT)
Dept: PHYSICAL THERAPY | Age: 66
Setting detail: THERAPIES SERIES
Discharge: HOME | End: 2021-06-17
Payer: MEDICARE

## 2021-06-17 PROCEDURE — 97110 THERAPEUTIC EXERCISES: CPT

## 2021-06-17 PROCEDURE — 92507 TX SP LANG VOICE COMM INDIV: CPT

## 2021-06-17 PROCEDURE — 97140 MANUAL THERAPY 1/> REGIONS: CPT

## 2021-06-17 PROCEDURE — 97112 NEUROMUSCULAR REEDUCATION: CPT

## 2021-06-17 PROCEDURE — 97032 APPL MODALITY 1+ESTIM EA 15: CPT

## 2021-06-17 NOTE — PROGRESS NOTES
Treatment/Activity Tolerance:   [x]Patient tolerated treatment well [] Patient limited by fatique  []Patient limited by pain [] Patient limited by other medical complications  [] Other:     Goals:    Short term goals  Time Frame for Short term goals: 6 weeks  Short term goal 1: pt will be indep in HEP - MET  Short term goal 2: pt will increase R DF strength by 1/3 muscle grade - MET  Short term goal 3: pt will ambulate with increased glut activation RLE   Short term goal 4: pt will decrease TUG time 10 sec  Short term goal 5: pt will increase LEFS score by 10 points to hit MCID           Plan: [] Continue per plan of care [x] Alter current plan (see comments)   [] Plan of care initiated [] Hold pending MD visit [] Discharge               Signature:  Jarret Perez PT , PT              Physician Signature:_______________________________Date:__________________  By signing above (or electronic signature), therapists plan is approved by physician

## 2021-06-17 NOTE — FLOWSHEET NOTE
[facility], [facility], [address] [place] [address], [address] [place] [address]  Phone: [phone]  Fax [phone]    Physical Therapy Daily Treatment Note    Date:  2021    Patient Name:  Sean Lr    :  1955  MRN: [id number]  Restrictions/Precautions:    Medical/Treatment Diagnosis Information:  · Diagnosis: CVA, R hemiparesis  Insurance/Certification information:  PT Insurance Information: Medicare  Physician Information:  Referring Practitioner: Olesya Saeed MD  Plan of care signed (Y/N):  N  Visit# / total visits:      G-Code (if applicable):          LEFS     Medicare Cap (if applicable):  4744 = total amount used, updated 2021    Time in:   9:00  Timed Treatment: 45  Total Treatment Time:  45  ________________________________________________________________________________________    Pain Level:    /10  SUBJECTIVE: Pt reports that he feels as though he is hunched over when ambulating. However, when provided an alternative cane position to promote more upright positioning, pt refused and wanted the cane position to remain the same. OBJECTIVE: [id number]    Exercise/Equipment Resistance/Repetitions Other comments   NuStep 5 min           Supine leg press  Supine hip flexion 3 min  3 min Manual resistance   Clams #1 2x10 BLE    Bridge    Single limb x15  x15    PHE    Bent knee x10  x10 RLE In side-lying this session   PKF  In sidelying this session   Supine clams  yellow   Supine hip neuro re-ed 3 min RLE    LAQ x10 RLE With TB assist   Seated hamstring curls x10 With maxislide        Side-lying knee extension/flexion x10 RLE    Hooklying ADD ball squeeze    With hip flexion   x10    Backwards walking alternating with resistance walking in parallel bars8 lapsRed band for forwards walking, 3 # weight removed alf through to increase stride length. Focusing on pushoff and stride length.    Standing Hip Flexion with facilitation x20  manual facilitation   Slant board 3x30\"    With hamstring stretch   TG 3 min    Standing step over object 3 min BLE    Minisquat x15 cueing   FSU x15 RLE 4\"   Side-stepping 4 laps // bars UE support          Other Therapeutic Activities:      Manual Treatments:         Modalities:      Test/Measurements:         ASSESSMENT:    Pt tolerated session well. Improving with standing exercise. Functional activities continue focusing on increasing push off and left leg step length to focus on patient goal of improved gait speed and control. Patient stride length was improved in activity. Treatment/Activity Tolerance:   [x]Patient tolerated treatment well [] Patient limited by fatique  []Patient limited by pain [] Patient limited by other medical complications  [] Other:     Goals:    Short term goals  Time Frame for Short term goals: 6 weeks  Short term goal 1: pt will be indep in HEP  Short term goal 2: pt will increase R DF strength by 1/3 muscle grade  Short term goal 3: pt will ambulate with increased glut activation RLE  Short term goal 4: pt will decrease TUG time 10 sec  Short term goal 5: pt will increase LEFS score by 10 points to hit MCID           Plan: [x] Continue per plan of care [] Alter current plan (see comments)   [] Plan of care initiated [] Hold pending MD visit [] Discharge      Plan for Next Session:  Biomechanical correction of problems as they present. Facilitate correct muscle firing patterns and activation with appropriate activities. Progress patient as tolerated.      Re-Certification Due Date:         Signature:  Gin Forrest, PT , PT

## 2021-06-17 NOTE — FLOWSHEET NOTE
[facility], [facility], [address], [address]   Phone: [phone]  Fax [phone]      Outpatient Occupational Therapy     [x] Daily Treatment Note   [] Progress Note   [] Discharge Note    Date:  6/17/2021    Patient Name:  Tariq Ojeda         YOB: 1955    Medical Diagnosis/ICD10[de-identified]   CVA/I63.9, Right hemiparesis, Right side neglect, Expressive aphasia  Treatment Diagnosis: Impaired self care status due to decreased functional use of RUE/RLE  Referring Physician: Dr. Shilo Fairchild    Referral Date:  3/15/2021  Onset Date:  Nov 2019  Visits Allowed/Insurance/Certification Information:   Medicare A/B, AARP  Restrictions/Precautions Right vic, aphasia    Plan of care sent to provider:    [x]Faxed (date:   ) []Co-signature    (attempts: 1[x] 2 []3[])        Plan of care signed:    [x]Yes (date:5/5/21; 6/2/21  )           []No       Progress Note covers period from (if applicable):    [x]NA    []From 6/3/21         To         Next Progress Note due:   6/29/2021    Visit# / total visits:  5/8, 7/8; 7/12    Functional Outcome Measure:   3/25/2021: QuickDASH: 42    Subjective: Pt arrived alone. Pt ambulating with SBQC.       Pain level: denies    Plan for Next Session:  WBing  NMES - elbow/wrist ext  PROM in supine    Self ROM  Mirror therapy    Objective:          Assessment of UE tone/spasticity:    Date: 3/25/2021 5/4/21   6/1/21  6/8/21   Shoulder flexors 2 1  2  1+    Internal rotators 1  1+ 1+  1+    External rotators 0  0 0   1   Elbow flexors 1+  (clonus)  1  (at end range)  1+ 1+   Elbow extensor 1+ 1+   1  1   Supinators 0  0  0  0   Pronators 1+  1  1+  1+   Wrist flexors 2  2  3  2   Wrist extensors 0 0   0  0   Digit flexors 2-3  2 2  3    Digit extensors 0  0  0  0         Modified Rian Scale  0    No increase in muscle tone  1    Slight increase in muscle tone, manifested by a catch and release or by minimal        resistance at the end of the range of motion when the affected part(s) is moved in        flexion or extension  1+ Slight increase in muscle tone, manifested by a catch, followed by minimal        resistance throughout the remainder (less than half) of the ROM  2    More marked increase in muscle tone through most of the ROM, but        affected part(s) easily moved  3    Considerable increase in muscle tone, passive movement difficult  4    Affected part(s) rigid in flexion or extension       Exercises:    Exercises in bold performed in department today. Items not bolded are carried forward from prior visits for continuity of the record. Exercise/Equipment Resistance/Repetitions HEP Other comments      ADL/IADL TRAINING       LB dressing Doff/don right shoe and AFO with modified independence as pt required increased time    [] Increased effort this date d/t new shoes    Per PT, pt no longer requires AFO 4/15/21     Functional Mobility Pt ambulating with SBQC, w/o AFO - with mod ind  OT<>bathroom  OT>parking lot []    ADLs Pt performed toileting tasks with modified indepedence    Modified indep with dof/don overhead shirts x2  Standing to dof, seated to don []      Brake reaction time RLE: 20.0 seconds  LLE: average of 0.804 seconds    OT expressed concerns about processing and response time and explained that in-clinic assessment would be limited by aphasia [] If pt wishes to pursue a driving, he will require hand controls and may need to learn to use LLE to operate brake and accelerator.     Recommended in-car driving training at [address] and THERAPEUTIC ACTIVITY      Visual status Pt's visual fields intact  Tracking intact    Pt reports right eye \"isn't good\"  Wears reading glasses occasionally      Education on stroke recovery Role of OT  Tone and management of tone  Typical progression of UE function after stoke     Reviewed recommendation for meeting with PM&R may be limited by aphasia. Arm skate Seated EOM, using rolling/hieght adjustable table    Pushing cones of edge of table (16x)    Demo active movement for: (flexor synergy)  Horizontal adduction, elbow flex and shld ext    Active assist for:  Horizontal abduction, elbow ext, shld flex [] Increased effort required - cues for breathing        THERAPEUTIC EXERCISE and MANUAL TECHNIQUES     AAROM  Attempted active assisted shld flexion    Pt demo shld abduction, elbow/wrist flex with attempts at active movement. PROM RUE  - Supine    Shld flexion to ~100o  Shld abduction to ~90o  ER to ~15-20o  Elbow ext  Wrist ext with increased effort  Digit ext with increased effort   [] Pt's sister in law trained on PROM techniques for shld flex, shld abd and elbow ext. She returned demo and verbalized understanding. Emailed program via [address] [place] [address]. Self ROM shld flexion while supine  Instructed to avoid flex >90  Pt returned demo [] Reviewed for HEP     Scap mobs  - right EOM - All direction    Supine - scap adduction    Side lying - scap depression, upward and downward rotation []       MODALITIES     NMES Right elbow ext  Intensity: 18  Time: 10 min    Right wrist/digit ext  Intensity: 17  Time: 20 min    Attempted grasp/release exercise during NMES to wrist/digit ext however attempts at active movement increasing flexor tone [] Empi unit    Symmetrical/synchronous  10s on/ 5s off  35 PPS  300 wavelength  2.0 ramp time       []       SPLINTING       RHS Pt reports pre-bettina resting hand splint at home as recommended by home OT.      Pt reports difficult to don independently []      Home Exercise Program: PROM by caregiver, self ROM, Shoulder/scap exercises with visual feedback    Treatment/Activity Tolerance:    Patients response to treatment:   [x]Patient tolerated treatment well []Patient limited by fatigue   []Patient limited by pain  []Patient limited by other medical complications   []Other:     Assessment:   Pt tolerating PROM and WBing on RUE. Improved response to NMES after WBing. Demo decreased tone in RUE after WBing, however flexor tone returns with attempts at active movement. Cont per OT poc.      GOALS:  Patient goal: \" Get right arm working. Move back into home. \"     Short Term Goals:  to be met in 2 weeks (by 6/15/2021)     Pt will demonstrate active assisted right shoulder flexion to 50 degrees or better, gravity reduced, while rating pain < 2/10 for improved ROM for functional task performance. - Progressing, CONT GOAL, 6/1/21     Pt will demonstrate 15o degrees of active assisted elbow flexion against gravity, while rating pain < 0/10 for improved ROM for functional task performance. - Progressing, CONT GOAL, 6/1/21     Pt and caregiver will independently verbalize 2 techniques to obtain optimal positioning of on right vic arm to ensure approximation of glenohumeral joint and decrease risk of complications from subluxation. - CONT GOAL 6/1/21    Pt will participate in assessment of brake reaction time with each foot. - GOAL MET, 6/3/21       Long Term Goals:  to be met in 4 weeks (by 6/29/2021)     Pt will demonstrate active assisted right shoulder flexion to 50 degrees, against gravity, while rating pain < 2/10 for improved ROM for functional task performance. - Progressing, CONT GOAL, 6/1/21     Pt will demonstrate 25o degrees of right active elbow flexion,against gravity, while rating pain < 0/10 for improved ROM for functional task performance. - Progressing, CONT GOAL, 6/1/21     Pt will demonstrate 20o degrees of right active elbow extension while rating pain < 0/10 for improved ROM for functional task performance. - Progressing, CONT GOAL, 6/1/21     Pt will tolerate WBing for 10 min through RUE with elbow and wrist extension for tone management in prep for active assisted movement.  - GOAL MET, 6/1/21      Pt will demo independence with ability to acheive WBing position on RUE with elbow and wrist extension to facilitate participation in [place] at home. - Goal Added 21       Prognosis: [x]Good   [x]Fair   []Poor    Patient Requires Follow-up:  [x]Yes  []No    Plan: []Plan of care initiated     [x]Continue per plan of care    [] Alter current plan (see comments)    []Hold pending MD visit []Discharge      ---  ---- --- ---- --- ---- --- ---- --- ---- --- ---- --- ---- --- ---- --- ---- --- --- ---    Therapeutic Exercise/Home Exercise Program:    minutes    Therapeutic Activity:   minutes    ADL Training:    minutes      Neuromuscular Re-Education:  15 minutes      Manual Therapy: 15   minutes    Splintin minutes     Modalities:   15 minutes    Time in:       1000   Time Out:   9390     Timed Code Treatment Minutes:45   minutes        Total Treatment Time:   45 minutes           Electronically signed by:  Juwan Meehan, OT, OTR/L

## 2021-06-17 NOTE — FLOWSHEET NOTE
indivdual phrase and short sentences, in response to therapy tasks, with 70% accuracy, mod-max cues:  - GOAL MET    Goal 7: The pt will write single basic words with 50% accuracy, mod-max cues:  - DISCONTINUE THIS GOAL DUE TO LACK OF PROGRESS      Other Treatments:      Assessment:   Continued improvements in spoken output. Plan:   Continued Speech Therapy services 2 x week for 6 weeks. Total Treatment Time / Charges     Time in Time out Total Time / units   Cognitive Tx         Speech Tx 1045 1130 45 min / 1 unit   Dysphagia Tx           Signature:     Oscar Ortega MA, [id number] [id number]  Speech-Language Pathologist  Phone #: [phone]  Fax #: [phone]

## 2021-06-22 ENCOUNTER — HOSPITAL ENCOUNTER (OUTPATIENT)
Dept: OCCUPATIONAL THERAPY | Age: 66
Setting detail: THERAPIES SERIES
Discharge: HOME | End: 2021-06-22
Payer: MEDICARE

## 2021-06-22 ENCOUNTER — HOSPITAL ENCOUNTER (OUTPATIENT)
Dept: SPEECH THERAPY | Age: 66
Setting detail: THERAPIES SERIES
Discharge: HOME | End: 2021-06-22
Payer: MEDICARE

## 2021-06-22 ENCOUNTER — HOSPITAL ENCOUNTER (OUTPATIENT)
Dept: PHYSICAL THERAPY | Age: 66
Setting detail: THERAPIES SERIES
Discharge: HOME | End: 2021-06-22
Payer: MEDICARE

## 2021-06-22 PROCEDURE — 97535 SELF CARE MNGMENT TRAINING: CPT

## 2021-06-22 PROCEDURE — 97112 NEUROMUSCULAR REEDUCATION: CPT

## 2021-06-22 PROCEDURE — 92507 TX SP LANG VOICE COMM INDIV: CPT

## 2021-06-22 PROCEDURE — 97032 APPL MODALITY 1+ESTIM EA 15: CPT

## 2021-06-22 PROCEDURE — 97110 THERAPEUTIC EXERCISES: CPT

## 2021-06-22 NOTE — FLOWSHEET NOTE
[facility], [facility], [address] [place] [address], [address]   Phone: [phone]  Fax [phone]      Outpatient Occupational Therapy     [x] Daily Treatment Note   [] Progress Note   [] Discharge Note    Date:  6/22/2021    Patient Name:  Elaine Mendoza         YOB: 1955    Medical Diagnosis/ICD10[de-identified]   CVA/I63.9, Right hemiparesis, Right side neglect, Expressive aphasia  Treatment Diagnosis: Impaired self care status due to decreased functional use of RUE/RLE  Referring Physician: Dr. Litzy Townsend    Referral Date:  3/15/2021  Onset Date:  Nov 2019  Visits Allowed/Insurance/Certification Information:   Medicare A/B, AARP  Restrictions/Precautions Right vic, aphasia    Plan of care sent to provider:    [x]Faxed (date:   ) []Co-signature    (attempts: 1[x] 2 []3[])        Plan of care signed:    [x]Yes (date:5/5/21; 6/2/21  )           []No       Progress Note covers period from (if applicable):    [x]NA    []From 6/3/21         To         Next Progress Note due:   6/29/2021    Visit# / total visits:  6/8, 7/8; 7/12    Functional Outcome Measure:   3/25/2021: QuickDASH: 42    Subjective: Pt arrived alone. Pt ambulating with SBQC.       Pain level: denies    Plan for Next Session:  WBing  NMES - elbow/wrist ext  PROM in supine    Self ROM  Mirror therapy  Cooking tasks    Objective:          Assessment of UE tone/spasticity:    Date: 3/25/2021 5/4/21   6/1/21  6/8/21   Shoulder flexors 2 1  2  1+    Internal rotators 1  1+ 1+  1+    External rotators 0  0 0   1   Elbow flexors 1+  (clonus)  1  (at end range)  1+ 1+   Elbow extensor 1+ 1+   1  1   Supinators 0  0  0  0   Pronators 1+  1  1+  1+   Wrist flexors 2  2  3  2   Wrist extensors 0 0   0  0   Digit flexors 2-3  2 2  3    Digit extensors 0  0  0  0         Modified Rian Scale  0    No increase in muscle tone  1    Slight increase in muscle tone, manifested by a catch and intact    Pt reports right eye \"isn't good\"  Wears reading glasses occasionally      Education on stroke recovery Role of OT  Tone and management of tone  Typical progression of UE function after stoke     Reviewed recommendation for meeting with PM&R MD to discuss tone management options   [] Pt's sister-in-law verbalized understanding   Pt will benefit from reinforcement d/t communication deficits. Discussed PM&R consult with pt's sister in law during phone call on 5/27/21      WBing RUE, fully ext  EOM  12 min     Inflatable arm splint in place to maintain elbow ext    Weight shifting toward right for increased proprioceptive input [] Denies pain with WBing    NMES in place on elbow ext during WBing   Right UE movement Scap squeeze, 3x  shld shrug, 3x    Completed in front of mirror for visual feed back  Contraction noted with shrug, slight movement noted  Contraction and movement noted with squeeze      Use of inflatable arm splint to isolate shoulder muscles for facilitating normal movement patterns and reduce compensation however - Difficulty isolating desired muscles - significant compensation noted      EOM - use of table top at shld ht  Active:Horizontal adduction  Active assisted: Horizontal abduction    Supine - Active assisted:   shld flex , 5x   shld ext, 5x  Elbow flex, 5x  Elbow ext, 5x   Reviewed scap squeeze and shld shrug to HEP - recommended in front of mirror    Noted pt initiating movement, even against gravity.                Increased effort required - cues for breathing      Pt able to initiate movements but requiring facilitation to move through full ROM and remain in normal movement pattern    Demo flex synergy, clemente with attempts at active elbow flex     Mirror Therapy Educated pt on purpose of and research re: mirror therapy    Pt participated in mirror therapy for 10 min  Completing exercises with left hand while watching reflection  2-3 cues to cont to watch reflexion  Pt  Will benefit from cont education d/t comprehension may be limited by aphasia. Arm skate Seated EOM, using rolling/hieght adjustable table    Pushing cones of edge of table (16x)    Demo active movement for: (flexor synergy)  Horizontal adduction, elbow flex and shld ext    Active assist for:  Horizontal abduction, elbow ext, shld flex [] Increased effort required - cues for breathing        THERAPEUTIC EXERCISE and MANUAL TECHNIQUES     AAROM  Attempted active assisted shld flexion    Pt demo shld abduction, elbow/wrist flex with attempts at active movement. PROM RUE  - Supine    Shld flexion to ~100o  Shld abduction to ~90o  ER to ~15-20o  Elbow ext  Wrist ext with increased effort  Digit ext with increased effort   [] Pt's sister in law trained on PROM techniques for shld flex, shld abd and elbow ext. She returned demo and verbalized understanding. Emailed program via [employer]. Self ROM shld flexion while supine  Instructed to avoid flex >90  Pt returned demo [] Reviewed for HEP     Scap mobs  - right EOM - All direction    Supine - scap adduction    Side lying - scap depression, upward and downward rotation []       MODALITIES     NMES Right elbow ext  Intensity: 17  Time: 10 min    Right wrist/digit ext  Intensity: 17  Time: 20 min    Attempted grasp/release exercise during NMES to wrist/digit ext however attempts at active movement increasing flexor tone [] Empi unit    Symmetrical/synchronous  10s on/ 5s off  35 PPS  300 wavelength  2.0 ramp time       []       SPLINTING       RHS Pt reports pre-bettina resting hand splint at home as recommended by home OT.      Pt reports difficult to don independently []      Home Exercise Program: PROM by caregiver, self ROM, Shoulder/scap exercises with visual feedback    Treatment/Activity Tolerance:    Patients response to treatment:   [x]Patient tolerated treatment well []Patient limited by fatigue   []Patient limited by pain  []Patient limited by other medical complications   []Other:     Assessment:   Pt tolerating WBing with NMES. Demo decreased tone in RUE after WBing and NMES, however flexor tone returns with attempts at active movement. Cont per OT poc.      GOALS:  Patient goal: \" Get right arm working. Move back into home. \"     Short Term Goals:  to be met in 2 weeks (by 6/15/2021)     Pt will demonstrate active assisted right shoulder flexion to 50 degrees or better, gravity reduced, while rating pain < 2/10 for improved ROM for functional task performance. - Progressing, CONT GOAL, 6/1/21     Pt will demonstrate 15o degrees of active assisted elbow flexion against gravity, while rating pain < 0/10 for improved ROM for functional task performance. - Progressing, CONT GOAL, 6/1/21     Pt and caregiver will independently verbalize 2 techniques to obtain optimal positioning of on right vic arm to ensure approximation of glenohumeral joint and decrease risk of complications from subluxation. - CONT GOAL 6/1/21    Pt will participate in assessment of brake reaction time with each foot. - GOAL MET, 6/3/21       Long Term Goals:  to be met in 4 weeks (by 6/29/2021)     Pt will demonstrate active assisted right shoulder flexion to 50 degrees, against gravity, while rating pain < 2/10 for improved ROM for functional task performance. - Progressing, CONT GOAL, 6/1/21     Pt will demonstrate 25o degrees of right active elbow flexion,against gravity, while rating pain < 0/10 for improved ROM for functional task performance. - Progressing, CONT GOAL, 6/1/21     Pt will demonstrate 20o degrees of right active elbow extension while rating pain < 0/10 for improved ROM for functional task performance. - Progressing, CONT GOAL, 6/1/21     Pt will tolerate WBing for 10 min through RUE with elbow and wrist extension for tone management in prep for active assisted movement.  - GOAL MET, 6/1/21      Pt will demo independence with ability to acheive WBing position on RUE with elbow

## 2021-06-22 NOTE — FLOWSHEET NOTE
[facility], [facility],  [address], [address]   Phone: [phone]  Fax [phone]      Speech-Language Pathology  Daily Treatment Note    Date:  2021    Patient Name:  Elaine Mendoza    :  1955  MRN: [id number]  Restrictions/Precautions:  Limited communication ability due to significant Aphasia  Treatment Diagnosis:    Codes     Aphasia as late effect of cerebrovascular accident (CVA)    -  Primary   Insurance/Certification information: Medicare A & B; [address]   Referring Physician:  Jose Doshi. Reg Edmondson MD  Plan of care signed (Y/N):  Y  Visit# / total visits:     Pain level: No reported or suspected pain noted     Progress Note: []  Yes  [x]  No  Next due by: Visit #10: 5/10 or 21     Subjective: The pt was in overall good spirits. Objective:   Short-term Goals:  Goal 1: NEW GOAL: The pt will say indivdual phrase and short sentences, in response to therapy tasks, with 90% accuracy, mod cues:  - 91% (10/11), mod cues, self-corrections and extra time; targeted via PACE techniques  WILL D/C GOAL AS MET IF ABLE TO MAINTAIN ACCURACY    Goal 2: NEW GOAL: The pt will complete basic confrontational and responsive naming with 90% accuracy, min cues  - Confrontational: 29% (2/7), min cues  - Responsive: 63% (5/8), min cues    Goal 3: NEW GOAL: The pt will recognize spoken letters from a field of 5 with 95% accuracy, min-mod cues:  - 0% (0/4) even with mod-max cues and going in alphabetical order  WILL DISCONTINUE GOAL IF PROGRESS IS NOT MADE SOON    Goal 4: The pt will use an AAC speech generating device to answer given basic questions with 80% accuracy, mod-max cues:  - GOAL DISCONTINUED. The pt has not been using or bring his iPad and has not been wanting to use AAC communication due to improved verbal communication. Goal 5:  The pt will complete basic confrontational and responsive naming with 75% accuracy, mod-max cues:  -  GOAL MET    Goal 6: The pt will say indivdual phrase and short sentences, in response to therapy tasks, with 70% accuracy, mod-max cues:  - GOAL MET    Goal 7: The pt will write single basic words with 50% accuracy, mod-max cues:  - DISCONTINUE THIS GOAL DUE TO LACK OF PROGRESS      Other Treatments:      Assessment:   Noted improved phrase production today but more difficulty with structured naming. Plan:   Continued Speech Therapy services 2 x week for 6 weeks. Total Treatment Time / Charges     Time in Time out Total Time / units   Cognitive Tx         Speech Tx 1047 1132 45 min / 1 unit   Dysphagia Tx           Signature:     Megan Larsen MA, Casey Her  [id number]  Speech-Language Pathologist  Phone #: [phone]  Fax #: [phone]

## 2021-06-22 NOTE — FLOWSHEET NOTE
Side-stepping 4 laps // bars UE support   Standing hip abduction. x10 B      Other Therapeutic Activities:      Manual Treatments:         Modalities:      Test/Measurements:         ASSESSMENT:    Pt tolerated session well. Improving with standing exercise. Functional activities continue focusing on increasing push off and left leg step length to focus on patient goal of improved gait speed and control. Patient required min-mod assist for standing hip abduction against gravity. Patient stride length was improved in activity. Treatment/Activity Tolerance:   [x]Patient tolerated treatment well [] Patient limited by fatique  []Patient limited by pain [] Patient limited by other medical complications  [] Other:     Goals:    Short term goals  Time Frame for Short term goals: 6 weeks  Short term goal 1: pt will be indep in HEP  Short term goal 2: pt will increase R DF strength by 1/3 muscle grade  Short term goal 3: pt will ambulate with increased glut activation RLE  Short term goal 4: pt will decrease TUG time 10 sec  Short term goal 5: pt will increase LEFS score by 10 points to hit MCID           Plan: [x] Continue per plan of care [] Alter current plan (see comments)   [] Plan of care initiated [] Hold pending MD visit [] Discharge      Plan for Next Session:  Biomechanical correction of problems as they present. Facilitate correct muscle firing patterns and activation with appropriate activities. Progress patient as tolerated. Re-Certification Due Date:         Signature:  Sherwin Story PT , PT                      VALERIA Sorensen  Physical therapist was present, directed the patient's care, made skilled judgement, and was responsible for the assessment and treatment of the patient.

## 2021-06-24 ENCOUNTER — HOSPITAL ENCOUNTER (OUTPATIENT)
Dept: OCCUPATIONAL THERAPY | Age: 66
Setting detail: THERAPIES SERIES
Discharge: HOME | End: 2021-06-24
Payer: MEDICARE

## 2021-06-24 ENCOUNTER — HOSPITAL ENCOUNTER (OUTPATIENT)
Dept: PHYSICAL THERAPY | Age: 66
Setting detail: THERAPIES SERIES
Discharge: HOME | End: 2021-06-24
Payer: MEDICARE

## 2021-06-24 ENCOUNTER — HOSPITAL ENCOUNTER (OUTPATIENT)
Dept: SPEECH THERAPY | Age: 66
Setting detail: THERAPIES SERIES
Discharge: HOME | End: 2021-06-24
Payer: MEDICARE

## 2021-06-24 PROCEDURE — 97112 NEUROMUSCULAR REEDUCATION: CPT

## 2021-06-24 PROCEDURE — 97032 APPL MODALITY 1+ESTIM EA 15: CPT

## 2021-06-24 PROCEDURE — 92507 TX SP LANG VOICE COMM INDIV: CPT

## 2021-06-24 PROCEDURE — 97110 THERAPEUTIC EXERCISES: CPT

## 2021-06-24 NOTE — PROGRESS NOTES
[facility], [facility], [address], [address]   Phone: [phone]  Fax [phone]        Speech Therapy Progress Note         The following patient has been evaluated for therapy services. Please review the attached summary of the patient's plan of care, and verify that you agree with plan for additional therapy services at this time. Thank you for the referral of this patient. Please sign the attached certification form. Physician signature_______________________ Date________________      Fax to: [facility] [phone]         Date: 2021        Patient Name:  Reyes Frias    :  1955  MRN: [id number]  Restrictions/Precautions:  Limited communication ability due to significant Aphasia  Treatment Diagnosis:     Codes     Aphasia as late effect of cerebrovascular accident ([address]   Insurance/Certification information: Medicare A & B; Eduard Richards   Referring Physician:  Amadeo Cordova. Stefanie Kahn MD  Plan of care signed (Y/N):  Y  Visit# / total visits:     Pain level: No reported or suspected pain noted           Time Period for Report:  21 to 21  Cancels/No-shows to date:  None    Plan of Care/Treatment to date:  [x] Speech-Language Evaluation/Treatment    [] Dysphagia Evaluation/Treatment        [] Dysphagia Treatment via Neuromuscular Electrical Stimulation (NMES)   [] Modified Barium Swallowing Study  [] Fiberoptic Endoscopic Evaluation of Swallowing (FEES)    [] Cognitive-Linguistic Skills Development  [] Voice evaluation and Treatment      [] Evaluation, modification, and Training of Voice Prosthetic     [] Evaluation for Speech-Generating Augmentative and Alternative Communication Device   [] Therapeutic Services for the use of Speech-Generating Device.    [] Other:     Significant Findings At Last Visit/Comments:    Subjective:  ·  Much improved verbal expression in natural communication settings, speaking at times in longer sentences with some missing content words. His sister-in-law was not present today but has reported that people at the pt's facility and herself have noticed significant improvements in the pt's spoken language. Objective:   Observation: Further increase in length and complexity of spoken utterances    Assessment:   Summary: The pt has made overall gains in his verbal expression, especially in terms of his natural communication. He is able to say more words now with more frequent productions of phrases and short sentences. He still becomes easily frustrated but does well with encouragement. Spelling, reading and recognition of letters has been a weakness for the pt. Considering the progress made, continued speech therapy services 2 x week for 4 weeks is recommended.  Patient's response to treatment: Pleasant and cooperative. Progress towards goals:    Short-term Goals:  Goal 1: NEW GOAL: The pt will say indivdual phrase and short sentences, in response to therapy tasks, with 90% accuracy, mod cues:  - 83%, mod cues; on average across sessions     Goal 2: NEW GOAL: The pt will complete basic confrontational and responsive naming with 90% accuracy, min cues  - Confrontational: 62%, min cues; on average across sessions  - Responsive: 69, min cues; on average across sessions     Goal 3: NEW GOAL: The pt will recognize spoken letters from a field of 5 with 95% accuracy, min-mod cues:  - GOAL DISCONTINUED 2/2 INCREASED DIFFICULTY WITH THIS TASK     Goal 4: The pt will use an AAC speech generating device to answer given basic questions with 80% accuracy, mod-max cues:  - GOAL DISCONTINUED. The pt has not been using or bring his iPad and has not been wanting to use AAC communication due to improved verbal communication.     Goal 5:  The pt will complete basic confrontational and responsive naming with 75% accuracy, mod-max cues:  -  GOAL MET     Goal 6: The pt will say indivdual phrase and short sentences, in response to therapy tasks, with 70% accuracy, mod-max cues:  - GOAL MET     Goal 7: The pt will write single basic words with 50% accuracy, mod-max cues:  - DISCONTINUE THIS GOAL DUE TO LACK OF PROGRESS      NEW GOAL:  - The pt will demonstrate reading comprehension of single phrases/short sentences with 90% accuracy, mod cues      Current Frequency/Duration:  # Days per week: [] 1 day # Weeks: [] 1 week [x] 4 weeks      [x] 2 days   [] 2 weeks [] 5 weeks      [] 3 days   [] 3 weeks [] 6 weeks     Rehab Potential: [] Excellent [x] Good [] Fair  [] Poor     Goal Status:  [] Achieved [x] Partially Achieved  [] Not Achieved     Patient Status: [] Continue per initial plan of Care     [] Patient now discharged     [x] Additional visits requested, Please re-certify for additional visits:      Requested frequency/duration: 2  X/week for 4 weeks    Electronically signed by:  CORWIN Valdez  Phone: [phone]  Fax: [phone]    If you have any questions or concerns, please don't hesitate to call.   Thank you for your referral.

## 2021-06-24 NOTE — FLOWSHEET NOTE
[facility], [facility], [facility] [address], [address]   Phone: [phone]  Fax [phone]      Outpatient Occupational Therapy     [x] Daily Treatment Note   [] Progress Note   [] Discharge Note    Date:  6/24/2021    Patient Name:  Edward Funk         YOB: 1955    Medical Diagnosis/ICD10[de-identified]   CVA/I63.9, Right hemiparesis, Right side neglect, Expressive aphasia  Treatment Diagnosis: Impaired self care status due to decreased functional use of RUE/RLE  Referring Physician: Dr. Adalgisa Pringle    Referral Date:  3/15/2021  Onset Date:  Nov 2019  Visits Allowed/Insurance/Certification Information:   Medicare A/B, AARP  Restrictions/Precautions Right vic, aphasia    Plan of care sent to provider:    [x]Faxed (date:   ) []Co-signature    (attempts: 1[x] 2 []3[])        Plan of care signed:    [x]Yes (date:5/5/21; 6/2/21  )           []No       Progress Note covers period from (if applicable):    [x]NA    []From 6/3/21         To         Next Progress Note due:   6/29/2021    Visit# / total visits:  7/8, 7/8; 7/12    Functional Outcome Measure:   3/25/2021: QuickDASH: 42    Subjective: Pt arrived alone. Pt ambulating with SBQC.       Pain level: denies    Plan for Next Session:  WBing  NMES - elbow/wrist ext  PROM in supine    Self ROM  Mirror therapy  Cooking tasks    Objective:          Assessment of UE tone/spasticity:    Date: 3/25/2021 5/4/21   6/1/21  6/8/21   Shoulder flexors 2 1  2  1+    Internal rotators 1  1+ 1+  1+    External rotators 0  0 0   1   Elbow flexors 1+  (clonus)  1  (at end range)  1+ 1+   Elbow extensor 1+ 1+   1  1   Supinators 0  0  0  0   Pronators 1+  1  1+  1+   Wrist flexors 2  2  3  2   Wrist extensors 0 0   0  0   Digit flexors 2-3  2 2  3    Digit extensors 0  0  0  0         Modified Rian Scale  0    No increase in muscle tone  1    Slight increase in muscle tone, manifested by a catch and release or by minimal        resistance at the end of the range of motion when the affected part(s) is moved in        flexion or extension  1+ Slight increase in muscle tone, manifested by a catch, followed by minimal        resistance throughout the remainder (less than half) of the ROM  2    More marked increase in muscle tone through most of the ROM, but        affected part(s) easily moved  3    Considerable increase in muscle tone, passive movement difficult  4    Affected part(s) rigid in flexion or extension       Exercises:    Exercises in bold performed in department today. Items not bolded are carried forward from prior visits for continuity of the record. Exercise/Equipment Resistance/Repetitions HEP Other comments      ADL/IADL TRAINING     Meal Prep Discussed plan to address cold and microwave level meal prep to assess readiness for return to home. Pt indicating he is able to cook for himself using one handed techniques. Agreeable to demo in-clinic with encouragement       LB dressing Doff/don right shoe and AFO with modified independence as pt required increased time    [] Increased effort this date d/t new shoes    Per PT, pt no longer requires AFO 4/15/21     Functional Mobility Pt ambulating with SBQC, w/o AFO - with mod ind  OT<>bathroom  OT>parking lot []    ADLs Pt performed toileting tasks with modified indepedence     Modified indep with dof/don overhead shirts x2  Standing to dof, seated to don []      Brake reaction time RLE: 20.0 seconds  LLE: average of 0.804 seconds    OT expressed concerns about processing and response time and explained that in-clinic assessment would be limited by aphasia [] If pt wishes to pursue a driving, he will require hand controls and may need to learn to use LLE to operate brake and accelerator.     Recommended in-car driving training at [address] [facility] and [employer]      Visual status Pt's visual fields intact  Tracking intact    Pt reports right eye \"isn't good\"  Wears reading glasses occasionally      Education on stroke recovery Role of OT  Tone and management of tone  Typical progression of UE function after stoke     Reviewed recommendation for meeting with PM&R MD to discuss tone management options   [] Pt's sister-in-law verbalized understanding   Pt will benefit from reinforcement d/t communication deficits. Discussed PM&R consult with pt's sister in law during phone call on 5/27/21      WBing RUE, fully ext  EOM  12 min     Inflatable arm splint in place to maintain elbow ext    Weight shifting toward right for increased proprioceptive input [] Denies pain with WBing    NMES in place on elbow ext during WBing   Right UE movement Scap squeeze, 3x  shld shrug, 3x    Completed in front of mirror for visual feed back  Contraction noted with shrug, slight movement noted  Contraction and movement noted with squeeze      Use of inflatable arm splint to isolate shoulder muscles for facilitating normal movement patterns and reduce compensation however - Difficulty isolating desired muscles - significant compensation noted      EOM - use of table top at shld ht  Active:Horizontal adduction  Active assisted: Horizontal abduction    Supine - Active assisted:   shld flex , 5x   shld ext, 5x  Elbow flex, 5x  Elbow ext, 5x   Reviewed scap squeeze and shld shrug to HEP - recommended in front of mirror    Noted pt initiating movement, even against gravity.                Increased effort required - cues for breathing      Pt able to initiate movements but requiring facilitation to move through full ROM and remain in normal movement pattern    Demo flex synergy, clemente with attempts at active elbow flex     Mirror Therapy Educated pt on purpose of and research re: mirror therapy    Pt participated in mirror therapy for 10 min  Completing exercises with left hand while watching reflection  2-3 cues to cont to watch reflexion  Pt  Will benefit complications   []Other:     Assessment:   Pt tolerating WBing with NMES. Demo decreased tone in RUE after WBing and NMES, however flexor tone returns with attempts at active movement. Cont per OT poc.      GOALS:  Patient goal: \" Get right arm working. Move back into home. \"     Short Term Goals:  to be met in 2 weeks (by 6/15/2021)     Pt will demonstrate active assisted right shoulder flexion to 50 degrees or better, gravity reduced, while rating pain < 2/10 for improved ROM for functional task performance. - Progressing, CONT GOAL, 6/1/21     Pt will demonstrate 15o degrees of active assisted elbow flexion against gravity, while rating pain < 0/10 for improved ROM for functional task performance. - Progressing, CONT GOAL, 6/1/21     Pt and caregiver will independently verbalize 2 techniques to obtain optimal positioning of on right vic arm to ensure approximation of glenohumeral joint and decrease risk of complications from subluxation. - CONT GOAL 6/1/21    Pt will participate in assessment of brake reaction time with each foot. - GOAL MET, 6/3/21       Long Term Goals:  to be met in 4 weeks (by 6/29/2021)     Pt will demonstrate active assisted right shoulder flexion to 50 degrees, against gravity, while rating pain < 2/10 for improved ROM for functional task performance. - Progressing, CONT GOAL, 6/1/21     Pt will demonstrate 25o degrees of right active elbow flexion,against gravity, while rating pain < 0/10 for improved ROM for functional task performance. - Progressing, CONT GOAL, 6/1/21     Pt will demonstrate 20o degrees of right active elbow extension while rating pain < 0/10 for improved ROM for functional task performance. - Progressing, CONT GOAL, 6/1/21     Pt will tolerate WBing for 10 min through RUE with elbow and wrist extension for tone management in prep for active assisted movement.  - GOAL MET, 6/1/21      Pt will demo independence with ability to acheive WBing position on RUE with elbow and wrist extension to facilitate participation in [address] at home. - Goal Added 21       Prognosis: [x]Good   [x]Fair   []Poor    Patient Requires Follow-up:  [x]Yes  []No    Plan: []Plan of care initiated     [x]Continue per plan of care    [] Alter current plan (see comments)    []Hold pending MD visit []Discharge      ---  ---- --- ---- --- ---- --- ---- --- ---- --- ---- --- ---- --- ---- --- ---- --- --- ---    Therapeutic Exercise/Home Exercise Program:    minutes    Therapeutic Activity:   minutes    ADL Training:    minutes      Neuromuscular Re-Education:  30 minutes      Manual Therapy:    minutes    Splintin minutes     Modalities:   15 minutes    Time in:       1000   Time Out:   9316     Timed Code Treatment Minutes:45   minutes        Total Treatment Time:   45 minutes           Electronically signed by:  Kb Wong OT, OTR/BETINA

## 2021-06-24 NOTE — FLOWSHEET NOTE
[facility] Therapy, [facility], [address] [facility] [address], [address] [place] [address]  Phone: [phone]  Fax [phone]    Physical Therapy Daily Treatment Note    Date:  2021    Patient Name:  Magi Dozier    :  1955  MRN: [id number]  Restrictions/Precautions:    Medical/Treatment Diagnosis Information:  · Diagnosis: CVA, R hemiparesis  Insurance/Certification information:  PT Insurance Information: Medicare  Physician Information:  Referring Practitioner: Vannesa Myers MD  Plan of care signed (Y/N):  N  Visit# / total visits:      G-Code (if applicable):          LEFS     Medicare Cap (if applicable):  6673 = total amount used, updated 2021    Time in:   9:00  Timed Treatment: 55  Total Treatment Time:  55  ________________________________________________________________________________________    Pain Level:    /10  SUBJECTIVE: Pt reports that his quad and ankle both feel tight today. OBJECTIVE: [id number]    Exercise/Equipment Resistance/Repetitions Other comments   NuStep 5 min           Supine leg press  Supine hip flexion 3 min  3 min Manual resistance   Clams #1 2x10 BLE    Bridge    Single limb x15  x15    PHE    Bent knee x10  x10 RLE In side-lying this session   PKF  In sidelying this session   Supine clams  yellow   Supine hip neuro re-ed 3 min RLE    LAQ x10 RLE With TB assist   Seated hamstring curls x10 With maxislide        Side-lying knee extension/flexion     Hooklying ADD ball squeeze    With hip flexion   x10    Backwards walking alternating with resistance walking in parallel bars8 lapsRed band for forwards walking, 3 # weight removed [facility] through to increase stride length. Focusing on pushoff and stride length.    Standing Hip Flexion with facilitation x20  manual facilitation   Slant board 3x30\"    With hamstring stretch   TG 3 min    Standing step over object 3 min BLE    Minisquat x15 cueing   FSU x15 RLE 4\" Side-stepping 4 laps // bars UE support   Standing hip abduction. x10 B      Other Therapeutic Activities:      Manual Treatments:    STM to R quad. Modalities:      Test/Measurements:         ASSESSMENT:    Pt tolerated session well. Improving with standing exercise. Functional activities continue focusing on increasing push off and left leg step length to focus on patient goal of improved gait speed and control. Patient required min-mod assist for standing hip flexion against gravity. Treatment/Activity Tolerance:   [x]Patient tolerated treatment well [] Patient limited by fatique  []Patient limited by pain [] Patient limited by other medical complications  [] Other:     Goals:    Short term goals  Time Frame for Short term goals: 6 weeks  Short term goal 1: pt will be indep in HEP  Short term goal 2: pt will increase R DF strength by 1/3 muscle grade  Short term goal 3: pt will ambulate with increased glut activation RLE  Short term goal 4: pt will decrease TUG time 10 sec  Short term goal 5: pt will increase LEFS score by 10 points to hit MCID           Plan: [x] Continue per plan of care [] Alter current plan (see comments)   [] Plan of care initiated [] Hold pending MD visit [] Discharge      Plan for Next Session:  Biomechanical correction of problems as they present. Facilitate correct muscle firing patterns and activation with appropriate activities. Progress patient as tolerated.      Re-Certification Due Date:         Signature:  Sherwin Story PT , PT

## 2021-06-24 NOTE — FLOWSHEET NOTE
pt will say indivdual phrase and short sentences, in response to therapy tasks, with 70% accuracy, mod-max cues:  - GOAL MET    Goal 7: The pt will write single basic words with 50% accuracy, mod-max cues:  - DISCONTINUE THIS GOAL DUE TO LACK OF PROGRESS      NEW GOAL:  - The pt will demonstrate reading comprehension of single phrases/short sentences with 90% accuracy, mod cues      Other Treatments:      Assessment:   Noted improved phrase production today but more difficulty with structured naming. Plan:   Continued Speech Therapy services 2 x week for 6 weeks. Total Treatment Time / Charges     Time in Time out Total Time / units   Cognitive Tx         Speech Tx 1045 1130 45 min / 1 unit   Dysphagia Tx           Signature:     Francis Eller MA, [id number]  Speech-Language Pathologist  Phone #: [phone]  Fax #: [phone]

## 2021-06-29 ENCOUNTER — HOSPITAL ENCOUNTER (OUTPATIENT)
Dept: PHYSICAL THERAPY | Age: 66
Setting detail: THERAPIES SERIES
Discharge: HOME | End: 2021-06-29
Payer: MEDICARE

## 2021-06-29 ENCOUNTER — HOSPITAL ENCOUNTER (OUTPATIENT)
Dept: OCCUPATIONAL THERAPY | Age: 66
Setting detail: THERAPIES SERIES
Discharge: HOME | End: 2021-06-29
Payer: MEDICARE

## 2021-06-29 ENCOUNTER — HOSPITAL ENCOUNTER (OUTPATIENT)
Dept: SPEECH THERAPY | Age: 66
Setting detail: THERAPIES SERIES
Discharge: HOME | End: 2021-06-29
Payer: MEDICARE

## 2021-06-29 PROCEDURE — 92507 TX SP LANG VOICE COMM INDIV: CPT

## 2021-06-29 PROCEDURE — 97140 MANUAL THERAPY 1/> REGIONS: CPT

## 2021-06-29 PROCEDURE — 97110 THERAPEUTIC EXERCISES: CPT

## 2021-06-29 PROCEDURE — 97112 NEUROMUSCULAR REEDUCATION: CPT

## 2021-06-29 NOTE — FLOWSHEET NOTE
[facility], [facility],  [address], [address]   Phone: [phone]  Fax [phone]      Speech-Language Pathology  Daily Treatment Note    Date:  2021    Patient Name:  Andrews Villalobos    :  1955  MRN: [id number]  Restrictions/Precautions:  Limited communication ability due to significant Aphasia  Treatment Diagnosis:    Codes     Aphasia as late effect of cerebrovascular accident (CVA)    -  Primary   Insurance/Certification information: Medicare A & B; [address]   Referring Physician:  Rocioia Courtneyer. Isiah Larose MD  Plan of care signed (Y/N):  Y  Visit# / total visits:     Pain level: No reported or suspected pain noted     Progress Note: []  Yes  [x]  No  Next due by: Visit #10: 1/10 or 21    Subjective:    Pt arrived on time, appeared to fatigue as session went on. Speaking at times in longer sentences with some missing content words. Objective:   Short-term Goals:  Goal 1: NEW GOAL: The pt will say indivdual phrase and short sentences, in response to therapy tasks, with 90% accuracy, mod cues:  - 60% (3/5), mod cues    Goal 2: NEW GOAL: The pt will complete basic confrontational and responsive naming with 90% accuracy, min cues  - Confrontational: 0% (0/3) min cues  - Responsive: Did not target     Goal 3: NEW GOAL:The pt will demonstrate reading comprehension of single phrases/short sentences with 90% accuracy, mod cues  - 60% (6/10), mod cues     Goal 4: The pt will use an AAC speech generating device to answer given basic questions with 80% accuracy, mod-max cues:  - GOAL DISCONTINUED. The pt has not been using or bring his iPad and has not been wanting to use AAC communication due to improved verbal communication. Goal 5:  The pt will complete basic confrontational and responsive naming with 75% accuracy, mod-max cues:  -  GOAL MET    Goal 6: The pt will say indivdual phrase and short sentences, in response to therapy tasks, with 70% accuracy, mod-max cues:  - GOAL MET    Goal 7: The pt will write single basic words with 50% accuracy, mod-max cues:  - DISCONTINUE THIS GOAL DUE TO LACK OF PROGRESS      Other Treatments:      Assessment:   Noted increased difficulty as session went on, particularly with confrontational naming. Plan:   Continued Speech Therapy services 2 x week for 6 weeks.      Total Treatment Time / Charges     Time in Time out Total Time / units   Cognitive Tx         Speech Tx 1045 1130 45 min / 1 unit   Dysphagia Tx           Signature:    Cheryl Sanders  Speech Pathology      Susanne Ramsay, [place], [address] [place] [address]  [facility][id number]  Speech-Language Pathologist  Phone #: [phone]  Fax #: [phone]

## 2021-06-29 NOTE — FLOWSHEET NOTE
[facility], [facility], [facility] [address], [address]   Phone: [phone]  Fax [phone]      Outpatient Occupational Therapy     [x] Daily Treatment Note   [] Progress Note   [] Discharge Note    Date:  6/29/2021    Patient Name:  Leonela Pederson         YOB: 1955    Medical Diagnosis/ICD10[de-identified]   CVA/I63.9, Right hemiparesis, Right side neglect, Expressive aphasia  Treatment Diagnosis: Impaired self care status due to decreased functional use of RUE/RLE  Referring Physician: Dr. Sandra Houser    Referral Date:  3/15/2021  Onset Date:  Nov 2019  Visits Allowed/Insurance/Certification Information:   Medicare A/B, AARP  Restrictions/Precautions Right vic, aphasia    Plan of care sent to provider:    [x]Faxed (date:   ) []Co-signature    (attempts: 1[x] 2 []3[])        Plan of care signed:    [x]Yes (date:5/5/21; 6/2/21  )           []No       Progress Note covers period from (if applicable):    []NA    [x]From 6/3/21         To     6/29/21    Next Progress Note due:   7/27/2021    Visit# / total visits:  8/8, 7/8; 7/12    Functional Outcome Measure:   3/25/2021: QuickDASH: 42  6/29/2021: QuickDASH: 42    Subjective: Pt arrived alone. Pt ambulating with SBQC.       Pain level: denies    Plan for Next Session:  WBing  NMES - elbow/wrist ext  PROM in supine    Self ROM  Mirror therapy  Cooking tasks    Objective:     Range of Motion     AROM                   RIGHT  RIGHT RIGHT RIGHT    LEFT      Date 3/25/2021  5/4/21 6/1/21 6/29/21    3/25/2021     Shoulder:   flex x  x x x    WNL                        ABD ~75o 70-75o  See comments See comments    WNL     Elbow:      ext/flex x See comments    See comments    WNL     Forearm:  sup/pron x  x   x    WNL     Wrist:       ext/flex x  x   x    WNL     Digits: x  x   x    WNL        Comments:   PROM of RUE WFL with increased time d/t flexor tone (see Modified Rian Scale below)     Demo initiation of active shrug on right with visual cues, moving though approx 25% of range  Demo full range for active scap squeeze    Attempts at RUE active movement result in right scap retraction, shld extension/abduction with compensation at upper trap, elbow flexion, pronation and wrist/digit flex. While EOM, pt able to move from 123o ext to 54o flex with facilitation to avoid compensating at shoulder. Trace contraction noted with attempts at elbow ext. Flexor tone increases with active movement, limiting active elbow ext. No active movement noted in wrist/digits.    No sublux noted in right [facility] joint.        Strength  Muscle  (*denotes pain) Right   Right  Right    Left   DATE 3/25/2021 5/4/21  6/1/21 6/29/21     3/25/2021   Shoulder Flexion  1  ? 1 1     WNL   Shoulder Abduction  2+ 3-  2+ 2+     WNL   Elbow Flexion 1?  1 1 2     WNL   Elbow Extension 0  1 1 1     WNL   Pronation 0  0 0 0     WNL   Supination 0  0 0 0     WNL   Wrist Flexion 0  0 0 0     WNL   Wrist Extension 0  0 0 0     WNL           Assessment of UE tone/spasticity:    Date: 3/25/2021 5/4/21   6/1/21  6/8/21 6/29/21   Shoulder flexors 2 1  2  1+  2   Internal rotators 1  1+ 1+  1+  2   External rotators 0  0 0   1 1   Elbow flexors 1+  (clonus)  1  (at end range)  1+ 1+  1  (at end range)   Elbow extensor 1+ 1+   1  1 1   Supinators 0  0  0  0 0   Pronators 1+  1  1+  1+ 1+   Wrist flexors 2  2  3  2 2-3   Wrist extensors 0 0   0  0 0   Digit flexors 2-3  2 2  3  2-3   Digit extensors 0  0  0  0 0    Comments: Reports periods of decreased tone periodically throughout the day.      Modified Rian Scale  0    No increase in muscle tone  1    Slight increase in muscle tone, manifested by a catch and release or by minimal        resistance at the end of the range of motion when the affected part(s) is moved in        flexion or extension  1+ Slight increase in muscle tone, manifested by a catch, followed by minimal        resistance throughout the remainder (less than half) of the ROM  2    More marked increase in muscle tone through most of the ROM, but        affected part(s) easily moved  3    Considerable increase in muscle tone, passive movement difficult  4    Affected part(s) rigid in flexion or extension       Exercises:    Exercises in bold performed in department today. Items not bolded are carried forward from prior visits for continuity of the record. Exercise/Equipment Resistance/Repetitions HEP Other comments      ADL/IADL TRAINING     Meal Prep Discussed plan to address cold and microwave level meal prep to assess readiness for return to home. Pt indicating he is able to cook for himself using one handed techniques. Agreeable to demo in-clinic with encouragement       LB dressing Doff/don right shoe and AFO with modified independence as pt required increased time    [] Increased effort this date d/t new shoes    Per PT, pt no longer requires AFO 4/15/21     Functional Mobility Pt ambulating with SBQC, w/o AFO - with mod ind  OT<>bathroom  OT>parking lot []    ADLs Pt performed toileting tasks with modified indepedence     Modified indep with dof/don overhead shirts x2  Standing to dof, seated to don []      Brake reaction time RLE: 20.0 seconds  LLE: average of 0.804 seconds    OT expressed concerns about processing and response time and explained that in-clinic assessment would be limited by aphasia [] If pt wishes to pursue a driving, he will require hand controls and may need to learn to use LLE to operate brake and accelerator.     Recommended in-car driving training at [address] and [employer]      Visual status Pt's visual fields intact  Tracking intact    Pt reports right eye \"isn't good\"  Wears reading glasses occasionally      Education on stroke recovery Role of OT  Tone and management of tone  Typical progression of UE function after stoke     Reviewed recommendation for meeting with PM&R MD to discuss tone management options   [] Pt's sister-in-law verbalized understanding   Pt will benefit from reinforcement d/t communication deficits. Discussed PM&R consult with pt's sister in law during phone call on 5/27/21      WBing RUE, fully ext  EOM  12 min     Inflatable arm splint in place to maintain elbow ext    Weight shifting toward right for increased proprioceptive input [] Denies pain with WBing    NMES in place on elbow ext during WBing   Right UE movement Scap squeeze, 5x  shld shrug, 5x    Completed in front of mirror for visual feed back  Contraction noted with shrug, slight movement noted  Contraction and movement noted with squeeze      Use of inflatable arm splint to isolate shoulder muscles for facilitating normal movement patterns and reduce compensation however - Difficulty isolating desired muscles - significant compensation noted      EOM - use of table top at shld ht  Active:Horizontal adduction  Active assisted: Horizontal abduction    Supine - Active assisted:   shld flex , 5x   shld ext, 5x  Elbow flex, 5x  Elbow ext, 5x   Reviewed scap squeeze and shld shrug to HEP - recommended in front of mirror    Noted pt initiating movement, even against gravity. Increased effort required - cues for breathing      Pt able to initiate movements but requiring facilitation to move through full ROM and remain in normal movement pattern    Demo flex synergy, clemente with attempts at active elbow flex     Mirror Therapy Educated pt on purpose of and research re: mirror therapy    Pt participated in mirror therapy for 10 min  Completing exercises with left hand while watching reflection  2-3 cues to cont to watch reflexion  Pt  Will benefit from cont education d/t comprehension may be limited by aphasia.       Arm skate Seated EOM, using rolling/hieght adjustable table    Pushing cones of edge of table (16x)    Demo active movement for: (flexor synergy)  Horizontal adduction, elbow flex and shld ext    Active assist for:  Horizontal abduction, elbow ext, shld flex [] Increased effort required - cues for breathing        THERAPEUTIC EXERCISE and MANUAL TECHNIQUES     AAROM  Attempted active assisted shld flexion    Pt demo shld abduction, elbow/wrist flex with attempts at active movement. PROM RUE  - Supine    Shld flexion to ~100o  Shld abduction to ~90o  ER to ~15-20o  Elbow ext  Wrist ext with increased effort  Digit ext with increased effort   [] Pt's sister in law trained on PROM techniques for shld flex, shld abd and elbow ext. She returned demo and verbalized understanding. Emailed program via [address] [place] [address]. Self ROM shld flexion while supine  Instructed to avoid flex >90  Pt returned demo [] Reviewed for HEP     Scap mobs  - right EOM - All direction    Supine - scap adduction    Side lying - scap depression, upward and downward rotation []       MODALITIES     NMES Right elbow ext  Intensity: 17  Time: 10 min    Right wrist/digit ext  Intensity: 17  Time: 20 min    Attempted grasp/release exercise during NMES to wrist/digit ext however attempts at active movement increasing flexor tone [] Empi unit    Symmetrical/synchronous  10s on/ 5s off  35 PPS  300 wavelength  2.0 ramp time       []       SPLINTING       RHS Pt reports pre-bettina resting hand splint at home as recommended by home OT. Pt reports difficult to don independently []      Home Exercise Program: PROM by caregiver, self ROM, Shoulder/scap exercises with visual feedback    Treatment/Activity Tolerance:    Patients response to treatment:   [x]Patient tolerated treatment well []Patient limited by fatigue   []Patient limited by pain  []Patient limited by other medical complications   []Other:     Assessment:   Progress note completed this date. Pt demo fair progress toward OT goals as Pt met 3/4 STG and 1/5 LTG. Pt progress with LUE AROM limited by increased spacticity in flexors.  Pt with has an

## 2021-06-29 NOTE — PROGRESS NOTES
[facility], [facility], [address], [address]  Phone: [phone]  Fax [phone]      Lindsay Paget  Dear Dr. Eve Dyson     The following patient has been assessed for therapy services. Please review the attached summary of the patient's plan of care, and verify that you agree with plan for additional therapy services at this time. Plan of Care/Treatment to date:  [x] Therapeutic Exercise  [x]  Modalities:  [x] Therapeutic Activity   [] Ultrasound [x] Electrical Stimulation   [] Total Motion Release   [] Fluidotherapy [x] Kinesiotaping  [x]  Neuromuscular Re-education  [] Iontophoresis [x] Coldpack/hotpack   [x]  Instruction in HEP    Other:  [x]  Manual Therapy     []   Dry needling             [x] ADL/Self Care  [x] IADL Training  [] LSVT BIG  [] Saebo  [x] Splinting  [] Wheelchair Mobility                       Frequency/Duration:  # Days per week: [x] 1 day # Weeks: [] 1 week [] 5 weeks      [x] 2 days   [] 2 weeks [] 6 weeks     [] 3 days   [] 3 weeks [] 7 weeks     [] 4 days   [x] 4 weeks [] 8 weeks     [] 5 days   [] 10 weeks [] 12 weeks    Rehab Potential: [] Excellent [x] Good [x] Fair  [] Poor     Thank you for the referral of this patient. Please sign.      Physician signature_______________________ Date________________  By signing above, therapists plan is approved by physician     Fax to: [facility] [phone]     Electronically signed by:  Uzma Larson, OT,OTR/L        Outpatient Occupational Therapy     [x] Daily Treatment Note   [x] Progress Note   [] Discharge Note    Date:  6/29/2021    Patient Name:  Leoncio Cohen         YOB: 1955    Medical Diagnosis/ICD10[de-identified]   CVA/I63.9, Right hemiparesis, Right side neglect, Expressive aphasia  Treatment Diagnosis: Impaired self care status due to decreased functional use of RUE/RLE  Referring demo fair progress toward OT goals as Pt met 3/4 STG and 1/5 LTG. Pt progress with LUE AROM limited by increased spacticity in flexors. Pt with has an appointment scheduled with Dr. Liz Cornelius (PM&R) to discuss tone management options. Plan to address IADL tasks while waiting for outcome of pt's visit with Dr. Liz Cornelius to assess pt's ability to return to independent living. Pt will cont to benefit from outpatient occupational therapy to address right hemiparesis and decreased IADL status. Recommend cont OP OT 1-2x/wk for 4 weeks. See updated OT poc below.        GOALS:  Patient goal: \" Get right arm working. Move back into home. \"     Short Term Goals:  to be met in 2 weeks (by 7/13/2021)     Pt will demonstrate active assisted right shoulder flexion to 50 degrees or better, gravity reduced, while rating pain < 2/10 for improved ROM for functional task performance. - CONT GOAL, 6/29/21     Pt will demonstrate 15o degrees of active assisted elbow flexion against gravity, while rating pain < 0/10 for improved ROM for functional task performance. - GOAL MET, 6/29/21     Pt and caregiver will independently verbalize 2 techniques to obtain optimal positioning of on right vic arm to ensure approximation of glenohumeral joint and decrease risk of complications from subluxation.- GOAL MET, 6/29/21    Pt will participate in assessment of brake reaction time with each foot. - GOAL MET, 6/3/21    Pt will complete moderate level meal prep with supervision and modified instructions as needed. - Goal Added 6/29/21    Pt will ID safety concerns in 4/5 safety awareness photos. - Goal Added 6/29/21       Long Term Goals:  to be met in 4 weeks (by 7/27/2021)     Pt will demonstrate active assisted right shoulder flexion to 50 degrees, against gravity, while rating pain < 2/10 for improved ROM for functional task performance.   - Progressing, CONT GOAL, 6/29/21     Pt will demonstrate 25o degrees of right active elbow flexion,against gravity, while rating pain < 0/10 for improved ROM for functional task performance. - Progressing, CONT GOAL, 6/29/21     Pt will demonstrate 20o degrees of right active elbow extension while rating pain < 0/10 for improved ROM for functional task performance. - Progressing, CONT GOAL, 6/29/21     Pt will tolerate WBing for 10 min through RUE with elbow and wrist extension for tone management in prep for active assisted movement.  - GOAL MET, 6/1/21      Pt will demo independence with ability to acheive WBing position on RUE with elbow and wrist extension to facilitate participation in [place] at home. - CONT GOAL, 6/29/21       Prognosis: [x]Good   [x]Fair   []Poor    Patient Requires Follow-up:  [x]Yes  []No    Plan: []Plan of care initiated     [x]Continue per plan of care    [] Alter current plan (see comments)    []Hold pending MD visit []Discharge        Time in:       0950   Time Out:   1045     Timed Code Treatment Minutes:55   minutes        Total Treatment Time:   55 minutes           Electronically signed by:  Remy Weston OT, OTR/L

## 2021-06-29 NOTE — FLOWSHEET NOTE
[facility], [facility], [address] [facility], [address]   Phone: [phone]  Fax [phone]    Physical Therapy Daily Treatment Note    Date:  2021    Patient Name:  Palma Mac    :  1955  MRN: [id number]  Restrictions/Precautions:    Medical/Treatment Diagnosis Information:  · Diagnosis: CVA, R hemiparesis  Insurance/Certification information:  PT Insurance Information: Medicare  Physician Information:  Referring Practitioner: Latesha Kaba MD  Plan of care signed (Y/N):  N  Visit# / total visits:  20/20 3/8    G-Code (if applicable):          LEFS     Medicare Cap (if applicable):  6744 = total amount used, updated 2021    Time in:   9:00  Timed Treatment: 45  Total Treatment Time:  45  ________________________________________________________________________________________    Pain Level:    /10  SUBJECTIVE: Pt indicates that he is feeling painful and frustrated with poor control over his LLE this date. OBJECTIVE: [id number]    Exercise/Equipment Resistance/Repetitions Other comments   NuStep 5 min           Supine leg press  Supine hip flexion 3 min  3 min Manual resistance   Clams #1 2x10 BLE    Bridge    Single limb x15  x15    PHE    Bent knee x10  x10 RLE In side-lying this session   PKF  In sidelying this session   Supine clams  yellow   Supine hip neuro re-ed 3 min RLE    LAQ x10 RLE With TB assist   Seated hamstring curls x10 With maxislide        Side-lying knee extension/flexion     Hooklying ADD ball squeeze    With hip flexion   x10    Backwards walking alternating with resistance walking in parallel bars8 lapsRed band for forwards walking, 3 # weight removed group home through to increase stride length. Focusing on pushoff and stride length.    Standing Hip Flexion with facilitation x20  manual facilitation   Slant board 3x30\"    With hamstring stretch   TG 3 min    Standing step over object 3 min BLE

## 2021-07-01 ENCOUNTER — HOSPITAL ENCOUNTER (OUTPATIENT)
Dept: OCCUPATIONAL THERAPY | Age: 66
Setting detail: THERAPIES SERIES
Discharge: HOME | End: 2021-07-01
Payer: MEDICARE

## 2021-07-01 ENCOUNTER — HOSPITAL ENCOUNTER (OUTPATIENT)
Dept: PHYSICAL THERAPY | Age: 66
Setting detail: THERAPIES SERIES
Discharge: HOME | End: 2021-07-01
Payer: MEDICARE

## 2021-07-01 ENCOUNTER — HOSPITAL ENCOUNTER (OUTPATIENT)
Dept: SPEECH THERAPY | Age: 66
Setting detail: THERAPIES SERIES
Discharge: HOME | End: 2021-07-01
Payer: MEDICARE

## 2021-07-01 PROCEDURE — 92507 TX SP LANG VOICE COMM INDIV: CPT

## 2021-07-01 PROCEDURE — 97110 THERAPEUTIC EXERCISES: CPT

## 2021-07-01 PROCEDURE — 97535 SELF CARE MNGMENT TRAINING: CPT

## 2021-07-01 NOTE — FLOWSHEET NOTE
[facility], [facility], [address] [place] [address]  Phone: [phone]  Fax [phone]    Physical Therapy Daily Treatment Note    Date:  2021    Patient Name:  Lynda Serna    :  1955  MRN: [id number]  Restrictions/Precautions:    Medical/Treatment Diagnosis Information:  · Diagnosis: CVA, R hemiparesis  Insurance/Certification information:  PT Insurance Information: Medicare  Physician Information:  Referring Practitioner: Fernando Phillip MD  Plan of care signed (Y/N):  N  Visit# / total visits:      G-Code (if applicable):          LEFS     Medicare Cap (if applicable):  5337 = total amount used, updated 2021    Time in:   9:00  Timed Treatment: 55  Total Treatment Time:  55  ________________________________________________________________________________________    Pain Level:    /10  SUBJECTIVE: Pt indicates that he is feeling well today, no new complaints but is a little frustrated with poor hand control this date. OBJECTIVE: [id number]    Exercise/Equipment Resistance/Repetitions Other comments   NuStep 5 min           Supine leg press  Supine hip flexion 3 min  3 min Manual resistance   Clams #1 2x10 BLE    Bridge    Single limb x15  x15    PHE    Bent knee x10  x10 RLE In side-lying this session   PKF  In sidelying this session   Supine clams  yellow   Supine hip neuro re-ed 3 min RLE    LAQ x10 RLE   With TB assist   Seated hamstring curls x10 With maxislide        Side-lying knee extension/flexion     Hooklying ADD ball squeeze    With hip flexion   x10    Backwards walking alternating with resistance walking in parallel bars8 lapsRed band for forwards walking, 3 # weight removed snf through to increase stride length. Focusing on pushoff and stride length.    Standing Hip Flexion with facilitation x20  manual facilitation   Slant board 3x30\"    With hamstring stretch   TG 3 min    Standing

## 2021-07-01 NOTE — FLOWSHEET NOTE
[facility], [facility],  [address], [address]   Phone: [phone]  Fax [phone]      Speech-Language Pathology  Daily Treatment Note    Date:  2021    Patient Name:  Parisa Diamond    :  1955  MRN: [id number]  Restrictions/Precautions:  Limited communication ability due to significant Aphasia  Treatment Diagnosis:    Codes     Aphasia as late effect of cerebrovascular accident (CVA)    -  Primary   Insurance/Certification information: Medicare A & B; [address]   Referring Physician:  Arlene Garcia. Mario Mena MD  Plan of care signed (Y/N):  Y  Visit# / total visits:  3/12   Pain level: No reported or suspected pain noted     Progress Note: []  Yes  [x]  No  Next due by: Visit #10: 2/10 or 21    Subjective:    Pt arrived on time, appeared to fatigue as session went on. Speaking at times in longer sentences with some missing content words. Objective:   Short-term Goals:  Goal 1: NEW GOAL: The pt will say indivdual phrase and short sentences, in response to therapy tasks, with 90% accuracy, mod cues:  -Explained basic self-care task with mod-max cues. Goal 2: NEW GOAL: The pt will complete basic confrontational and responsive naming with 90% accuracy, min cues  - Confrontational: 25% (2/8) min cues  - Responsive: 25% (1/4) min cues    Goal 3: NEW GOAL:The pt will demonstrate reading comprehension of single phrases/short sentences with 90% accuracy, mod cues  - 0% (0/6), mod-max cues    Goal 4: The pt will use an AAC speech generating device to answer given basic questions with 80% accuracy, mod-max cues:  - GOAL DISCONTINUED. The pt has not been using or bring his iPad and has not been wanting to use AAC communication due to improved verbal communication. Goal 5:  The pt will complete basic confrontational and responsive naming with 75% accuracy, mod-max cues:  -  GOAL MET    Goal 6: The pt will say indivdual phrase and short sentences, in response to therapy tasks, with 70% accuracy, mod-max cues:  - GOAL MET    Goal 7: The pt will write single basic words with 50% accuracy, mod-max cues:  - DISCONTINUE THIS GOAL DUE TO LACK OF PROGRESS      Other Treatments:      Assessment:   Noted increased difficulty as session went on. Pt had two other therapy sessions prior speech therapy on this date, possible cause of fatigue and increased difficulty. Plan:   Continued Speech Therapy services 2 x week for 6 weeks.      Total Treatment Time / Charges     Time in Time out Total Time / units   Cognitive Tx         Speech Tx 1045 1130 45 min / 1 unit   Dysphagia Tx           Signature:    Heather Gutierrez  Speech Pathology      [place], [place], [address] [id number]  Speech-Language Pathologist  Phone #: [phone]  Fax #: [phone]

## 2021-07-01 NOTE — FLOWSHEET NOTE
[facility], [facility][1], [address] [place] [address]   Phone: [phone]  Fax [phone]      Outpatient Occupational Therapy     [x] Daily Treatment Note   [] Progress Note   [] Discharge Note    Date:  7/1/2021    Patient Name:  Jonathan Zurita         YOB: 1955    Medical Diagnosis/ICD10[de-identified]   CVA/I63.9, Right hemiparesis, Right side neglect, Expressive aphasia  Treatment Diagnosis: Impaired self care status due to decreased functional use of RUE/RLE  Referring Physician: Dr. Tammy Kendall    Referral Date:  3/15/2021  Onset Date:  Nov 2019  Visits Allowed/Insurance/Certification Information:   Medicare A/B, AARP  Restrictions/Precautions Right vic, aphasia    Plan of care sent to provider:    [x]Faxed (date:   ) []Co-signature    (attempts: 1[x] 2 []3[])        Plan of care signed:    [x]Yes (date:5/5/21; 6/2/21;6/30/21  )           []No       Progress Note covers period from (if applicable):    [x]NA    []From 7/1/21         To         Next Progress Note due:   7/24/2021    Visit# / total visits: 1/8, 8/8, 7/8; 7/12    Functional Outcome Measure:   3/25/2021: QuickDASH: 42  6/29/2021: QuickDASH: 42    Subjective: Pt arrived alone. Pt ambulating with SBQC. Pain level: denies    Plan for Next Session:  WBing  NMES - elbow/wrist ext  PROM in supine    Self ROM  Cooking tasks  - one handed techniques    Objective:     Exercises:    Exercises in bold performed in department today. Items not bolded are carried forward from prior visits for continuity of the record. Exercise/Equipment Resistance/Repetitions HEP Other comments      ADL/IADL TRAINING     Meal Prep Pt prepared instant mac n cheese this date. Pt familiar with steps to complete task.     Demo difficulty with:   · Opening pkgs with one hand   · Seeing fill line indicator inside cup  · Selecting numbers on microwave     Adaptations:  · Simplified written instructions  · dycem under cup  · Scissor to open cheese pk      Plan for cont training with one handed techniques for kitchen tasks    Educated on adapted cutting board (pt may have one already) and one handed can/jar openers. LB dressing Doff/don right shoe and AFO with modified independence as pt required increased time    [] Increased effort this date d/t new shoes    Per PT, pt no longer requires AFO 4/15/21     Functional Mobility Pt ambulating with SBQC, w/o AFO - with mod ind  OT<>bathroom  OT>parking lot []    ADLs Pt performed toileting tasks with modified indepedence     Modified indep with dof/don overhead shirts x2  Standing to dof, seated to don []      Brake reaction time RLE: 20.0 seconds  LLE: average of 0.804 seconds    OT expressed concerns about processing and response time and explained that in-clinic assessment would be limited by aphasia [] If pt wishes to pursue a driving, he will require hand controls and may need to learn to use LLE to operate brake and accelerator. Recommended in-car driving training at [facility] [address] and THERAPEUTIC ACTIVITY      Visual status Pt's visual fields intact  Tracking intact    Pt reports right eye \"isn't good\"  Wears reading glasses occasionally      Education on stroke recovery Role of OT  Tone and management of tone  Typical progression of UE function after stoke     Reviewed recommendation for meeting with PM&R MD to discuss tone management options   [] Pt's sister-in-law verbalized understanding   Pt will benefit from reinforcement d/t communication deficits.     Discussed PM&R consult with pt's sister in law during phone call on 5/27/21      WBing RUE, fully ext  EOM  12 min     Inflatable arm splint in place to maintain elbow ext    Weight shifting toward right for increased proprioceptive input [] Denies pain with WBing    NMES in place on elbow ext during WBing   Right UE movement Scap squeeze, 5x  shld shrug, 5x    Completed in front of mirror for visual feed back  Contraction noted with shrug, slight movement noted  Contraction and movement noted with squeeze      Use of inflatable arm splint to isolate shoulder muscles for facilitating normal movement patterns and reduce compensation however - Difficulty isolating desired muscles - significant compensation noted      EOM - use of table top at shld ht  Active:Horizontal adduction  Active assisted: Horizontal abduction    Supine - Active assisted:   shld flex , 5x   shld ext, 5x  Elbow flex, 5x  Elbow ext, 5x   Reviewed scap squeeze and shld shrug to HEP - recommended in front of mirror    Noted pt initiating movement, even against gravity. Increased effort required - cues for breathing      Pt able to initiate movements but requiring facilitation to move through full ROM and remain in normal movement pattern    Demo flex synergy, clemente with attempts at active elbow flex     Mirror Therapy Educated pt on purpose of and research re: mirror therapy    Pt participated in mirror therapy for 10 min  Completing exercises with left hand while watching reflection  2-3 cues to cont to watch reflexion  Pt  Will benefit from cont education d/t comprehension may be limited by aphasia. Arm skate Seated EOM, using rolling/hieght adjustable table    Pushing cones of edge of table (16x)    Demo active movement for: (flexor synergy)  Horizontal adduction, elbow flex and shld ext    Active assist for:  Horizontal abduction, elbow ext, shld flex [] Increased effort required - cues for breathing        THERAPEUTIC EXERCISE and MANUAL TECHNIQUES     AAROM  Attempted active assisted shld flexion    Pt demo shld abduction, elbow/wrist flex with attempts at active movement.        PROM RUE  - Supine    Shld flexion to ~100o  Shld abduction to ~90o  ER to ~15-20o  Elbow ext  Wrist ext with increased effort  Digit ext with increased effort   [] Pt's sister in law trained on PROM techniques for shld flex, shld abd and elbow ext. She returned demo and verbalized understanding. Emailed program via [address]. Self ROM shld flexion while supine  Instructed to avoid flex >90  Pt returned demo [] Reviewed for HEP     Scap mobs  - right EOM - All direction    Supine - scap adduction    Side lying - scap depression, upward and downward rotation []       MODALITIES     NMES Right elbow ext  Intensity: 17  Time: 10 min    Right wrist/digit ext  Intensity: 17  Time: 20 min    Attempted grasp/release exercise during NMES to wrist/digit ext however attempts at active movement increasing flexor tone [] Empi unit    Symmetrical/synchronous  10s on/ 5s off  35 PPS  300 wavelength  2.0 ramp time       []       SPLINTING       RHS Pt reports pre-bettina resting hand splint at home as recommended by home OT. Pt reports difficult to don independently []      Home Exercise Program: PROM by caregiver, self ROM, Shoulder/scap exercises with visual feedback    Treatment/Activity Tolerance:    Patients response to treatment:   [x]Patient tolerated treatment well []Patient limited by fatigue   []Patient limited by pain  []Patient limited by other medical complications   []Other:     Assessment:   Pt participated in familiar meal prep task. Max verbal and visual cues for operating the microwave. Requires cont education/training on ine handed techniques. Cont per OT poc.        GOALS:  Patient goal: \" Get right arm working. Move back into home. \"     Short Term Goals:  to be met in 2 weeks (by 7/13/2021)     Pt will demonstrate active assisted right shoulder flexion to 50 degrees or better, gravity reduced, while rating pain < 2/10 for improved ROM for functional task performance. - CONT GOAL, 6/29/21     Pt will demonstrate 15o degrees of active assisted elbow flexion against gravity, while rating pain < 0/10 for improved ROM for functional task performance.  - GOAL MET, 6/29/21     Pt and caregiver will independently verbalize 2 techniques to obtain optimal positioning of on right vic arm to ensure approximation of glenohumeral joint and decrease risk of complications from subluxation.- GOAL MET, 21    Pt will participate in assessment of brake reaction time with each foot. - GOAL MET, 6/3/21    Pt will complete moderate level meal prep with supervision and modified instructions as needed. - Goal Added 21    Pt will ID safety concerns in 4/5 safety awareness photos. - Goal Added 21       Long Term Goals:  to be met in 4 weeks (by 2021)     Pt will demonstrate active assisted right shoulder flexion to 50 degrees, against gravity, while rating pain < 2/10 for improved ROM for functional task performance. - Progressing, CONT GOAL, 21     Pt will demonstrate 25o degrees of right active elbow flexion,against gravity, while rating pain < 0/10 for improved ROM for functional task performance. - Progressing, CONT GOAL, 21     Pt will demonstrate 20o degrees of right active elbow extension while rating pain < 0/10 for improved ROM for functional task performance. - Progressing, CONT GOAL, 21     Pt will tolerate WBing for 10 min through RUE with elbow and wrist extension for tone management in prep for active assisted movement.  - GOAL MET, 21      Pt will demo independence with ability to acheive WBing position on RUE with elbow and wrist extension to facilitate participation in [place] at home. - CONT GOAL, 21       Prognosis: [x]Good   [x]Fair   []Poor    Patient Requires Follow-up:  [x]Yes  []No    Plan: []Plan of care initiated     [x]Continue per plan of care    [] Alter current plan (see comments)    []Hold pending MD visit []Discharge      ---  ---- --- ---- --- ---- --- ---- --- ---- --- ---- --- ---- --- ---- --- ---- --- --- ---    Therapeutic Exercise/Home Exercise Program:  10  minutes    Therapeutic Activity:   minutes    ADL Trainin minutes Neuromuscular Re-Education:   minutes      Manual Therapy:    minutes    Splintin minutes     Modalities:    minutes    Time in:       1000   Time Out:   1045     Timed Code Treatment Minutes:45   minutes        Total Treatment Time:   45 minutes           Electronically signed by:  Too Cruz OT, OTR/L

## 2021-07-06 ENCOUNTER — HOSPITAL ENCOUNTER (OUTPATIENT)
Dept: SPEECH THERAPY | Age: 66
Setting detail: THERAPIES SERIES
Discharge: HOME | End: 2021-07-06
Payer: MEDICARE

## 2021-07-06 ENCOUNTER — HOSPITAL ENCOUNTER (OUTPATIENT)
Dept: OCCUPATIONAL THERAPY | Age: 66
Setting detail: THERAPIES SERIES
Discharge: HOME | End: 2021-07-06
Payer: MEDICARE

## 2021-07-06 PROCEDURE — 97140 MANUAL THERAPY 1/> REGIONS: CPT

## 2021-07-06 PROCEDURE — 92507 TX SP LANG VOICE COMM INDIV: CPT

## 2021-07-06 PROCEDURE — 97112 NEUROMUSCULAR REEDUCATION: CPT

## 2021-07-06 PROCEDURE — 97535 SELF CARE MNGMENT TRAINING: CPT

## 2021-07-06 NOTE — FLOWSHEET NOTE
[facility], [address]   Phone: [phone]  Fax [phone]      Outpatient Occupational Therapy     [x] Daily Treatment Note   [] Progress Note   [] Discharge Note    Date:  7/6/2021    Patient Name:  Debbie Kennedy         YOB: 1955    Medical Diagnosis/ICD10[de-identified]   CVA/I63.9, Right hemiparesis, Right side neglect, Expressive aphasia  Treatment Diagnosis: Impaired self care status due to decreased functional use of RUE/RLE  Referring Physician: Dr. Adilia Holden    Referral Date:  3/15/2021  Onset Date:  Nov 2019  Visits Allowed/Insurance/Certification Information:   Medicare A/B, AARP  Restrictions/Precautions Right vic, aphasia    Plan of care sent to provider:    [x]Faxed (date:   ) []Co-signature    (attempts: 1[x] 2 []3[])        Plan of care signed:    [x]Yes (date:5/5/21; 6/2/21;6/30/21  )           []No       Progress Note covers period from (if applicable):    [x]NA    []From 7/1/21         To         Next Progress Note due:   7/24/2021    Visit# / total visits: 2/8, 8/8, 7/8; 7/12    Functional Outcome Measure:   3/25/2021: QuickDASH: 42  6/29/2021: QuickDASH: 42    Subjective: Pt arrived alone. Pt ambulating with SBQC.       Pain level: denies    Plan for Next Session:  WBing  NMES - elbow/wrist ext  PROM in supine    Self ROM  Cooking tasks  - one handed techniques         Assessment of UE tone/spasticity:    Date: 3/25/2021 5/4/21   6/1/21  6/8/21 6/29/21   Shoulder flexors 2 1  2  1+  2   Internal rotators 1  1+ 1+  1+  2   External rotators 0  0 0   1 1   Elbow flexors 1+  (clonus)  1  (at end range)  1+ 1+  1  (at end range)   Elbow extensor 1+ 1+   1  1 1   Supinators 0  0  0  0 0   Pronators 1+  1  1+  1+ 1+   Wrist flexors 2  2  3  2 2-3   Wrist extensors 0 0   0  0 0   Digit flexors 2-3  2 2  3  2-3   Digit extensors 0  0  0  0 0    Comments: Reports periods of decreased tone periodically throughout the day.      Modified Rian Scale  0    No increase in muscle tone  1    Slight increase in muscle tone, manifested by a catch and release or by minimal        resistance at the end of the range of motion when the affected part(s) is moved in        flexion or extension  1+ Slight increase in muscle tone, manifested by a catch, followed by minimal        resistance throughout the remainder (less than half) of the ROM  2    More marked increase in muscle tone through most of the ROM, but        affected part(s) easily moved  3    Considerable increase in muscle tone, passive movement difficult  4    Affected part(s) rigid in flexion or extension    Objective:     Exercises:    Exercises in bold performed in department today. Items not bolded are carried forward from prior visits for continuity of the record. Exercise/Equipment Resistance/Repetitions HEP Other comments      ADL/IADL TRAINING     Safety pictures Pt correctly ID safety concern in 19 of 19 pictures. Meal Prep Pt prepared instant mac n cheese this date. Pt familiar with steps to complete task. Demo difficulty with:   · Opening pkgs with one hand   · Seeing fill line indicator inside cup  · Selecting numbers on microwave     Adaptations:  · Simplified written instructions  · dycem under cup  · Scissor to open cheese pk      Plan for cont training with one handed techniques for kitchen tasks    Educated on adapted cutting board (pt may have one already) and one handed can/jar openers.       LB dressing Doff/don right shoe and AFO with modified independence as pt required increased time    [] Increased effort this date d/t new shoes    Per PT, pt no longer requires AFO 4/15/21     Functional Mobility Pt ambulating with SBQC, w/o AFO - with mod ind  OT<>bathroom  OT>parking lot []    ADLs Pt performed toileting tasks with modified indepedence     Modified indep with dof/don overhead shirts x2  Standing to dof, seated to don []      Brake reaction time RLE: 20.0 seconds  LLE: average of 0.804 seconds    OT expressed concerns about processing and response time and explained that in-clinic assessment would be limited by aphasia [] If pt wishes to pursue a driving, he will require hand controls and may need to learn to use LLE to operate brake and accelerator. Recommended in-car driving training at [facility] [address] and THERAPEUTIC ACTIVITY      Visual status Pt's visual fields intact  Tracking intact    Pt reports right eye \"isn't good\"  Wears reading glasses occasionally      Education on stroke recovery Role of OT  Tone and management of tone  Typical progression of UE function after stoke     Reviewed recommendation for meeting with PM&R MD to discuss tone management options   [] Pt's sister-in-law verbalized understanding   Pt will benefit from reinforcement d/t communication deficits.     Discussed PM&R consult with pt's sister in law during phone call on 5/27/21      WBing RUE, fully ext  EOM  15 min     Inflatable arm splint in place to maintain elbow ext    Weight shifting toward right for increased proprioceptive input [] Denies pain with WBing    NMES in place on elbow ext during WBing   Right UE movement Scap squeeze, 5x  shld shrug, 5x    Completed in front of mirror for visual feed back  Contraction noted with shrug, slight movement noted  Contraction and movement noted with squeeze      Use of inflatable arm splint to isolate shoulder muscles for facilitating normal movement patterns and reduce compensation however - Difficulty isolating desired muscles - significant compensation noted      EOM - use of table top at shld ht  Active:Horizontal adduction  Active assisted: Horizontal abduction    Supine - Active assisted:   shld flex , 5x   shld ext, 5x  Elbow flex, 5x  Elbow ext, 5x   Reviewed scap squeeze and shld shrug to HEP - recommended in front of mirror    Noted pt initiating movement, even against gravity. Increased effort required - cues for breathing      Pt able to initiate movements but requiring facilitation to move through full ROM and remain in normal movement pattern    Demo flex synergy, clemente with attempts at active elbow flex     Mirror Therapy Educated pt on purpose of and research re: mirror therapy    Pt participated in mirror therapy for 10 min  Completing exercises with left hand while watching reflection  2-3 cues to cont to watch reflexion  Pt  Will benefit from cont education d/t comprehension may be limited by aphasia. Arm skate Seated EOM, using rolling/hieght adjustable table    Pushing cones of edge of table (16x)    Demo active movement for: (flexor synergy)  Horizontal adduction, elbow flex and shld ext    Active assist for:  Horizontal abduction, elbow ext, shld flex [] Increased effort required - cues for breathing        THERAPEUTIC EXERCISE and MANUAL TECHNIQUES        PROM RUE  - Supine    Shld flexion to ~100o  Shld abduction to ~90-95o  ER to ~20o  Elbow ext  Wrist ext with increased effort  Digit ext with increased effort   [] Pt's sister in law trained on PROM techniques for shld flex, shld abd and elbow ext. She returned demo and verbalized understanding. Emailed program via [employer].       Self ROM shld flexion while supine  Instructed to avoid flex >90  Pt returned demo [] Reviewed for HEP     Scap mobs  - right EOM - All direction    Supine - scap adduction    Side lying - scap depression, upward and downward rotation []       MODALITIES     NMES Right elbow ext  Intensity: 17  Time: 18 min    Right wrist/digit ext  Intensity: 17  Time: 20 min    Attempted grasp/release exercise during NMES to wrist/digit ext however attempts at active movement increasing flexor tone [] Empi unit    Symmetrical/synchronous  10s on/ 5s off  35 PPS  300 wavelength  2.0 ramp time       []       SPLINTING       RHS Pt reports pre-bettina resting hand splint at home as recommended by home OT. Pt reports difficult to don independently []      Home Exercise Program: PROM by caregiver, self ROM, Shoulder/scap exercises with visual feedback    Treatment/Activity Tolerance:    Patients response to treatment:   [x]Patient tolerated treatment well []Patient limited by fatigue   []Patient limited by pain  []Patient limited by other medical complications   []Other:     Assessment:   Pt tolerating PROM for improved muscle elasticity and joint integrity. Participating in [place] during NMES for proprioceptive and sensory input, facilitating ROM in RUE. Demo good safety awareness as evidence by ID all safety concerns in home safety photos. Cont per OT poc.        GOALS:  Patient goal: \" Get right arm working. Move back into home. \"     Short Term Goals:  to be met in 2 weeks (by 7/13/2021)     Pt will demonstrate active assisted right shoulder flexion to 50 degrees or better, gravity reduced, while rating pain < 2/10 for improved ROM for functional task performance. - CONT GOAL, 6/29/21     Pt will demonstrate 15o degrees of active assisted elbow flexion against gravity, while rating pain < 0/10 for improved ROM for functional task performance. - GOAL MET, 6/29/21     Pt and caregiver will independently verbalize 2 techniques to obtain optimal positioning of on right vic arm to ensure approximation of glenohumeral joint and decrease risk of complications from subluxation.- GOAL MET, 6/29/21    Pt will participate in assessment of brake reaction time with each foot. - GOAL MET, 6/3/21    Pt will complete moderate level meal prep with supervision and modified instructions as needed. - Goal Added 6/29/21    Pt will ID safety concerns in 4/5 safety awareness photos.  - GOAL MET, 7/6/21       Long Term Goals:  to be met in 4 weeks (by 7/27/2021)     Pt will demonstrate active assisted right shoulder flexion to 50 degrees, against gravity, while rating pain < 2/10 for improved ROM for

## 2021-07-06 NOTE — FLOWSHEET NOTE
[facility], [facility],  [address], [address]   Phone: [phone]  Fax [phone]      Speech-Language Pathology  Daily Treatment Note    Date:  2021    Patient Name:  Ana Lambert    :  1955  MRN: [id number]  Restrictions/Precautions:  Limited communication ability due to significant Aphasia  Treatment Diagnosis:    Codes     Aphasia as late effect of cerebrovascular accident (CVA)    -  Primary   Insurance/Certification information: Medicare A & B; [address]   Referring Physician:  Nahum Constantino. Seth Chauhan MD  Plan of care signed (Y/N):  Y  Visit# / total visits:     Pain level: No reported or suspected pain noted     Progress Note: []  Yes  [x]  No  Next due by: Visit #10: 3/10 or 21    Subjective: The pt was in good spirits throughout. Objective:   Short-term Goals:  Goal 1: NEW GOAL: The pt will say indivdual phrase and short sentences, in response to therapy tasks, with 90% accuracy, mod cues:  -83% (5/6); targeted via having him describe basic daily task sequencing pictures. Goal 2: NEW GOAL: The pt will complete basic confrontational and responsive naming with 90% accuracy, min cues  - Confrontational: 83% (5/6) min cues  - Responsive: 20% (1/5) min cues    Goal 3: NEW GOAL:The pt will demonstrate reading comprehension of single phrases/short sentences with 90% accuracy, mod cues  - Goal not targeted. Goal 4: The pt will use an AAC speech generating device to answer given basic questions with 80% accuracy, mod-max cues:  - GOAL DISCONTINUED. The pt has not been using or bring his iPad and has not been wanting to use AAC communication due to improved verbal communication. Goal 5:  The pt will complete basic confrontational and responsive naming with 75% accuracy, mod-max cues:  -  GOAL MET    Goal 6: The pt will say indivdual phrase and short sentences, in response to therapy tasks, with 70% accuracy, mod-max cues:  - GOAL MET    Goal 7: The pt will write single basic words with 50% accuracy, mod-max cues:  - DISCONTINUE THIS GOAL DUE TO LACK OF PROGRESS      Other Treatments:      Assessment:   Noted continued difficulty with verbal initiation. Plan:   Continued Speech Therapy services 2 x week for 6 weeks.      Total Treatment Time / Charges     Time in Time out Total Time / units   Cognitive Tx         Speech Tx 1535 1620 45 min / 1 unit   Dysphagia Tx           Signature:    Brie Oliver  Speech Pathology      Troy Byers, Steffen Smith  [id number]  Speech-Language Pathologist  Phone #: [phone]  Fax #: [phone]

## 2021-07-08 ENCOUNTER — HOSPITAL ENCOUNTER (OUTPATIENT)
Dept: OCCUPATIONAL THERAPY | Age: 66
Setting detail: THERAPIES SERIES
Discharge: HOME | End: 2021-07-08
Payer: MEDICARE

## 2021-07-08 ENCOUNTER — HOSPITAL ENCOUNTER (OUTPATIENT)
Dept: PHYSICAL THERAPY | Age: 66
Setting detail: THERAPIES SERIES
Discharge: HOME | End: 2021-07-08
Payer: MEDICARE

## 2021-07-08 ENCOUNTER — APPOINTMENT (OUTPATIENT)
Dept: GENERAL RADIOLOGY | Age: 66
End: 2021-07-08
Payer: MEDICARE

## 2021-07-08 ENCOUNTER — HOSPITAL ENCOUNTER (EMERGENCY)
Age: 66
Discharge: HOME | End: 2021-07-08
Attending: EMERGENCY MEDICINE
Payer: MEDICARE

## 2021-07-08 ENCOUNTER — APPOINTMENT (OUTPATIENT)
Dept: CT IMAGING | Age: 66
End: 2021-07-08
Payer: MEDICARE

## 2021-07-08 ENCOUNTER — HOSPITAL ENCOUNTER (OUTPATIENT)
Dept: SPEECH THERAPY | Age: 66
Setting detail: THERAPIES SERIES
Discharge: HOME | End: 2021-07-08
Payer: MEDICARE

## 2021-07-08 VITALS
WEIGHT: 175 LBS | HEIGHT: 67 IN | RESPIRATION RATE: 11 BRPM | TEMPERATURE: 98.1 F | HEART RATE: 87 BPM | OXYGEN SATURATION: 97 % | BODY MASS INDEX: 27.47 KG/M2 | SYSTOLIC BLOOD PRESSURE: 127 MMHG | DIASTOLIC BLOOD PRESSURE: 85 MMHG

## 2021-07-08 DIAGNOSIS — R56.9 SEIZURE-LIKE ACTIVITY (HCC): Primary | ICD-10-CM

## 2021-07-08 LAB
A/G RATIO: 1.9 (ref 1.1–2.2)
ALBUMIN SERPL-MCNC: 4.6 G/DL (ref 3.4–5)
ALP BLD-CCNC: 91 U/L (ref 40–129)
ALT SERPL-CCNC: 24 U/L (ref 10–40)
ANION GAP SERPL CALCULATED.3IONS-SCNC: 19 MMOL/L (ref 3–16)
AST SERPL-CCNC: 19 U/L (ref 15–37)
BASOPHILS ABSOLUTE: 0.1 K/UL (ref 0–0.2)
BASOPHILS RELATIVE PERCENT: 1 %
BILIRUB SERPL-MCNC: 0.7 MG/DL (ref 0–1)
BUN BLDV-MCNC: 12 MG/DL (ref 7–20)
CALCIUM SERPL-MCNC: 9.2 MG/DL (ref 8.3–10.6)
CHLORIDE BLD-SCNC: 104 MMOL/L (ref 99–110)
CO2: 17 MMOL/L (ref 21–32)
CREAT SERPL-MCNC: 0.9 MG/DL (ref 0.8–1.3)
EOSINOPHILS ABSOLUTE: 0.3 K/UL (ref 0–0.6)
EOSINOPHILS RELATIVE PERCENT: 4.4 %
GFR AFRICAN AMERICAN: >60
GFR NON-AFRICAN AMERICAN: >60
GLOBULIN: 2.4 G/DL
GLUCOSE BLD-MCNC: 125 MG/DL (ref 70–99)
HCT VFR BLD CALC: 45.3 % (ref 40.5–52.5)
HEMOGLOBIN: 15.9 G/DL (ref 13.5–17.5)
LYMPHOCYTES ABSOLUTE: 1.8 K/UL (ref 1–5.1)
LYMPHOCYTES RELATIVE PERCENT: 26.7 %
MCH RBC QN AUTO: 29.7 PG (ref 26–34)
MCHC RBC AUTO-ENTMCNC: 35.1 G/DL (ref 31–36)
MCV RBC AUTO: 84.6 FL (ref 80–100)
MONOCYTES ABSOLUTE: 0.5 K/UL (ref 0–1.3)
MONOCYTES RELATIVE PERCENT: 7.5 %
NEUTROPHILS ABSOLUTE: 4 K/UL (ref 1.7–7.7)
NEUTROPHILS RELATIVE PERCENT: 60.4 %
PDW BLD-RTO: 13.8 % (ref 12.4–15.4)
PLATELET # BLD: 198 K/UL (ref 135–450)
PMV BLD AUTO: 7.7 FL (ref 5–10.5)
POTASSIUM SERPL-SCNC: 3.9 MMOL/L (ref 3.5–5.1)
RBC # BLD: 5.35 M/UL (ref 4.2–5.9)
SODIUM BLD-SCNC: 140 MMOL/L (ref 136–145)
TOTAL CK: 82 U/L (ref 39–308)
TOTAL PROTEIN: 7 G/DL (ref 6.4–8.2)
WBC # BLD: 6.6 K/UL (ref 4–11)

## 2021-07-08 PROCEDURE — 97535 SELF CARE MNGMENT TRAINING: CPT

## 2021-07-08 PROCEDURE — 97112 NEUROMUSCULAR REEDUCATION: CPT

## 2021-07-08 PROCEDURE — 96375 TX/PRO/DX INJ NEW DRUG ADDON: CPT

## 2021-07-08 PROCEDURE — 99284 EMERGENCY DEPT VISIT MOD MDM: CPT

## 2021-07-08 PROCEDURE — 6360000002 HC RX W HCPCS: Performed by: EMERGENCY MEDICINE

## 2021-07-08 PROCEDURE — 80053 COMPREHEN METABOLIC PANEL: CPT

## 2021-07-08 PROCEDURE — 2580000003 HC RX 258: Performed by: EMERGENCY MEDICINE

## 2021-07-08 PROCEDURE — 93005 ELECTROCARDIOGRAM TRACING: CPT | Performed by: EMERGENCY MEDICINE

## 2021-07-08 PROCEDURE — 92507 TX SP LANG VOICE COMM INDIV: CPT

## 2021-07-08 PROCEDURE — 82550 ASSAY OF CK (CPK): CPT

## 2021-07-08 PROCEDURE — 70450 CT HEAD/BRAIN W/O DYE: CPT

## 2021-07-08 PROCEDURE — 96365 THER/PROPH/DIAG IV INF INIT: CPT

## 2021-07-08 PROCEDURE — 97140 MANUAL THERAPY 1/> REGIONS: CPT

## 2021-07-08 PROCEDURE — 85025 COMPLETE CBC W/AUTO DIFF WBC: CPT

## 2021-07-08 PROCEDURE — 71045 X-RAY EXAM CHEST 1 VIEW: CPT

## 2021-07-08 RX ORDER — ONDANSETRON 2 MG/ML
4 INJECTION INTRAMUSCULAR; INTRAVENOUS ONCE
Status: COMPLETED | OUTPATIENT
Start: 2021-07-08 | End: 2021-07-08

## 2021-07-08 RX ORDER — FLUOXETINE HYDROCHLORIDE 20 MG/1
CAPSULE ORAL
COMMUNITY
Start: 2021-06-16

## 2021-07-08 RX ORDER — LEVETIRACETAM 500 MG/1
500 TABLET ORAL 2 TIMES DAILY
Qty: 60 TABLET | Refills: 0 | Status: SHIPPED | OUTPATIENT
Start: 2021-07-08

## 2021-07-08 RX ORDER — BACLOFEN 10 MG/1
TABLET ORAL
COMMUNITY
Start: 2021-06-16

## 2021-07-08 RX ORDER — ATORVASTATIN CALCIUM 80 MG/1
80 TABLET, FILM COATED ORAL DAILY
COMMUNITY
Start: 2021-06-16

## 2021-07-08 RX ORDER — CLOPIDOGREL BISULFATE 75 MG/1
75 TABLET ORAL DAILY
COMMUNITY
Start: 2021-06-17

## 2021-07-08 RX ORDER — BROMOCRIPTINE MESYLATE 2.5 MG/1
TABLET ORAL
COMMUNITY
Start: 2021-06-16

## 2021-07-08 RX ORDER — LOSARTAN POTASSIUM 25 MG/1
25 TABLET ORAL DAILY
COMMUNITY
Start: 2021-06-16

## 2021-07-08 RX ORDER — PANTOPRAZOLE SODIUM 40 MG/1
TABLET, DELAYED RELEASE ORAL
COMMUNITY
Start: 2021-06-16

## 2021-07-08 RX ADMIN — ONDANSETRON 4 MG: 2 INJECTION INTRAMUSCULAR; INTRAVENOUS at 16:56

## 2021-07-08 RX ADMIN — LEVETIRACETAM 1000 MG: 100 INJECTION, SOLUTION INTRAVENOUS at 16:56

## 2021-07-08 NOTE — FLOWSHEET NOTE
[place]  [facility], [facility], [address] [place] Kristyn [facility], [address]   Phone: [phone]  Fax [phone]      Outpatient Occupational Therapy     [x] Daily Treatment Note   [] Progress Note   [] Discharge Note    Date:  7/8/2021    Patient Name:  Enrique Zarco         YOB: 1955    Medical Diagnosis/ICD10[de-identified]   CVA/I63.9, Right hemiparesis, Right side neglect, Expressive aphasia  Treatment Diagnosis: Impaired self care status due to decreased functional use of RUE/RLE  Referring Physician: Dr. Trent Lindo    Referral Date:  3/15/2021  Onset Date:  Nov 2019  Visits Allowed/Insurance/Certification Information:   Medicare A/B, AARP  Restrictions/Precautions Right vic, aphasia    Plan of care sent to provider:    [x]Faxed (date:   ) []Co-signature    (attempts: 1[x] 2 []3[])        Plan of care signed:    [x]Yes (date:5/5/21; 6/2/21;  6/30/21  )           []No       Progress Note covers period from (if applicable):    [x]NA    []From 7/1/21         To         Next Progress Note due:   7/24/2021    Visit# / total visits: 3/8, 8/8, 7/8; 7/12    Functional Outcome Measure:   3/25/2021: QuickDASH: 42  6/29/2021: QuickDASH: 42    Subjective: Pt arrived alone. Pt ambulating with SBQC.       Pain level: denies    Plan for Next Session:  WBing  NMES - elbow/wrist ext  PROM in supine    Self ROM  Cooking tasks  - one handed techniques      Assessment of UE tone/spasticity:    Date: 3/25/2021 5/4/21   6/1/21  6/8/21 6/29/21   Shoulder flexors 2 1  2  1+  2   Internal rotators 1  1+ 1+  1+  2   External rotators 0  0 0   1 1   Elbow flexors 1+  (clonus)  1  (at end range)  1+ 1+  1  (at end range)   Elbow extensor 1+ 1+   1  1 1   Supinators 0  0  0  0 0   Pronators 1+  1  1+  1+ 1+   Wrist flexors 2  2  3  2 2-3   Wrist extensors 0 0   0  0 0   Digit flexors 2-3  2 2  3  2-3   Digit extensors 0  0  0  0 0    Comments: Reports periods of decreased tone periodically throughout the day.      Modified Rian Scale  0    No increase in muscle tone  1    Slight increase in muscle tone, manifested by a catch and release or by minimal        resistance at the end of the range of motion when the affected part(s) is moved in        flexion or extension  1+ Slight increase in muscle tone, manifested by a catch, followed by minimal        resistance throughout the remainder (less than half) of the ROM  2    More marked increase in muscle tone through most of the ROM, but        affected part(s) easily moved  3    Considerable increase in muscle tone, passive movement difficult  4    Affected part(s) rigid in flexion or extension    Objective:     Exercises:    Exercises in bold performed in department today. Items not bolded are carried forward from prior visits for continuity of the record. Exercise/Equipment Resistance/Repetitions HEP Other comments      ADL/IADL TRAINING     Safety pictures Pt correctly ID safety concern in 19 of 19 pictures. Meal Prep Pt fried and egg. Step-by-step verbal cues provided. · Unable to crack egg  · Able to flip egg with non-dominant hand with increased time  · Able to move egg from frying pan to plate with non-dominant hand with increased time  · Reports he does not typically cook eggs at home    Pt prepared instant mac n cheese this date. Pt familiar with steps to complete task. Demo difficulty with:   · Opening pkgs with one hand   · Seeing fill line indicator inside cup  · Selecting numbers on microwave     Adaptations:  · Simplified written instructions  · dycem under cup  · Scissor to open cheese pk      Plan for cont training with one handed techniques for kitchen tasks    Educated on adapted cutting board (pt may have one already) and one handed can/jar openers.       LB dressing Doff/don right shoe and AFO with modified independence as pt required increased time    [] Increased effort this date d/t new shoes    Per PT, pt no longer requires AFO 4/15/21     Functional Mobility Pt ambulating with SBQC, w/o AFO - with mod ind  OT<>bathroom  OT>parking lot []    ADLs Pt performed toileting tasks with modified indepedence     Modified indep with dof/don overhead shirts x2  Standing to dof, seated to don []      Brake reaction time RLE: 20.0 seconds  LLE: average of 0.804 seconds    OT expressed concerns about processing and response time and explained that in-clinic assessment would be limited by aphasia [] If pt wishes to pursue a driving, he will require hand controls and may need to learn to use LLE to operate brake and accelerator. Recommended in-car driving training at [address] and [employer]      Visual status Pt's visual fields intact  Tracking intact    Pt reports right eye \"isn't good\"  Wears reading glasses occasionally      Education on stroke recovery Role of OT  Tone and management of tone  Typical progression of UE function after stoke     Reviewed recommendation for meeting with PM&R MD to discuss tone management options   [] Pt's sister-in-law verbalized understanding   Pt will benefit from reinforcement d/t communication deficits.     Discussed PM&R consult with pt's sister in law during phone call on 5/27/21      WBing RUE, fully ext  EOM  10 min     Inflatable arm splint in place to maintain elbow ext    Weight shifting toward right for increased proprioceptive input [] Denies pain with WBing    NMES in place on elbow ext during WBing   Right UE movement Scap squeeze, 5x  shld shrug, 5x    Completed in front of mirror for visual feed back  Contraction noted with shrug, slight movement noted  Contraction and movement noted with squeeze      Use of inflatable arm splint to isolate shoulder muscles for facilitating normal movement patterns and reduce compensation however - Difficulty isolating desired muscles - significant compensation noted      EOM - use of table top at grasp/release exercise during NMES to wrist/digit ext however attempts at active movement increasing flexor tone [] Empi unit    Symmetrical/synchronous  10s on/ 5s off  35 PPS  300 wavelength  2.0 ramp time       []       SPLINTING       RHS Pt reports pre-bettina resting hand splint at home as recommended by home OT. Pt reports difficult to don independently []      Home Exercise Program: PROM by caregiver, self ROM, Shoulder/scap exercises with visual feedback    Treatment/Activity Tolerance:    Patients response to treatment:   [x]Patient tolerated treatment well []Patient limited by fatigue   []Patient limited by pain  []Patient limited by other medical complications   []Other:     Assessment:   Pt tolerating PROM for improved muscle elasticity and joint integrity. Participating in [place] during NMES for proprioceptive and sensory input, facilitating ROM in RUE. Continued training on one handed techniques during kitchen tasks. Cont per OT poc.        GOALS:  Patient goal: \" Get right arm working. Move back into home. \"     Short Term Goals:  to be met in 2 weeks (by 7/13/2021)     Pt will demonstrate active assisted right shoulder flexion to 50 degrees or better, gravity reduced, while rating pain < 2/10 for improved ROM for functional task performance. - CONT GOAL, 6/29/21     Pt will demonstrate 15o degrees of active assisted elbow flexion against gravity, while rating pain < 0/10 for improved ROM for functional task performance. - GOAL MET, 6/29/21     Pt and caregiver will independently verbalize 2 techniques to obtain optimal positioning of on right vic arm to ensure approximation of glenohumeral joint and decrease risk of complications from subluxation.- GOAL MET, 6/29/21    Pt will participate in assessment of brake reaction time with each foot. - GOAL MET, 6/3/21    Pt will complete moderate level meal prep with supervision and modified instructions as needed.  - Goal Added 6/29/21    Pt will ID safety concerns in /5 safety awareness photos. - GOAL MET, 21       Long Term Goals:  to be met in 4 weeks (by 2021)     Pt will demonstrate active assisted right shoulder flexion to 50 degrees, against gravity, while rating pain < 2/10 for improved ROM for functional task performance. - Progressing, CONT GOAL, 21     Pt will demonstrate 25o degrees of right active elbow flexion,against gravity, while rating pain < 0/10 for improved ROM for functional task performance. - Progressing, CONT GOAL, 21     Pt will demonstrate 20o degrees of right active elbow extension while rating pain < 0/10 for improved ROM for functional task performance. - Progressing, CONT GOAL, 21     Pt will tolerate WBing for 10 min through RUE with elbow and wrist extension for tone management in prep for active assisted movement.  - GOAL MET, 21      Pt will demo independence with ability to acheive WBing position on RUE with elbow and wrist extension to facilitate participation in [facility] at home. - CONT GOAL, 21       Prognosis: [x]Good   [x]Fair   []Poor    Patient Requires Follow-up:  [x]Yes  []No    Plan: []Plan of care initiated     [x]Continue per plan of care    [] Alter current plan (see comments)    []Hold pending MD visit []Discharge      ---  ---- --- ---- --- ---- --- ---- --- ---- --- ---- --- ---- --- ---- --- ---- --- --- ---    Therapeutic Exercise/Home Exercise Program:    minutes    Therapeutic Activity:   minutes    ADL Training:   15  minutes      Neuromuscular Re-Education:  30 minutes      Manual Therapy:  14  minutes    Splintin minutes     Modalities:    minutes    Time in:       5758   Time Out:   1515     Timed Code Treatment Minutes:59  minutes        Total Treatment Time:   59 minutes           Electronically signed by:  Marialuisa Rangel OT, OTR/L

## 2021-07-08 NOTE — ED PROVIDER NOTES
[address] [facility]  ED PROVIDER NOTE    Patient Identification  Pt Name: Xavi Lorenzo  MRN: [id number]  Ginagfyaakov 1955  Date of evaluation: 7/8/2021  Provider: Debbie Richard MD  PCP: No primary care provider on file. Chief Complaint  Seizures (patient was being seen at his PCP and had a witnessed seizure. patient non verbal from a past stroke and squad reports baseline upon arrival to the ED )      HPI  History provided by patients legal guardian, Angus Barksdale, who is his brother  This is a 77 y.o. male who presents to the ED for seizure-like activity. Reportedly whole body shaking. The patient is unable to tell me any open-ended answers however he does answer to most yes/no questions. He endorses nausea. Denies abdominal pain. No chest pain. No fevers. No headache. No pain at all in general.  No tongue biting. No loss of urine or stool. His brother believes that he might have had a seizure in the past that was unwitnessed. He believes that he is more tired appearing than before. The patient occasionally follows commands with difficulty. He has baseline right-sided weakness of the leg, arm, and face per his brother. ROS  10 systems reviewed, pertinent positives/negatives per HPI otherwise noted to be negative. I have reviewed the following nursing documentation:  Allergies: Patient has no known allergies. Past medical history:   Past Medical History:   Diagnosis Date    Cerebral artery occlusion with cerebral infarction ([facility] [address]     Hyperlipidemia     Hypertension      Past surgical history: History reviewed. No pertinent surgical history.     Home medications:   Previous Medications    ATORVASTATIN (LIPITOR) 80 MG TABLET    Take 80 mg by mouth daily    BACLOFEN (LIORESAL) 10 MG TABLET    TAKE 1 TABLET BY MOUTH THREE TIMES DAILY    BROMOCRIPTINE (PARLODEL) 2.5 MG TABLET    TAKE 2 TABLETS BY MOUTH TWICE DAILY    CLOPIDOGREL (PLAVIX) 75 MG TABLET    Take 75 mg by mouth daily    FLUOXETINE (PROZAC) 20 MG CAPSULE    TAKE 1 CAPSULE BY MOUTH DAILY    LOSARTAN (COZAAR) 25 MG TABLET    Take 25 mg by mouth daily    PANTOPRAZOLE (PROTONIX) 40 MG TABLET    TAKE 1 TABLET BY MOUTH DAILY       Social history:  has an unknown smoking status. He has never used smokeless tobacco. He reports previous alcohol use. Family history:  History reviewed. No pertinent family history. Exam  ED Triage Vitals   BP Temp Temp Source Pulse Resp SpO2 Height Weight   07/08/21 1556 07/08/21 1556 07/08/21 1556 07/08/21 1556 07/08/21 1556 07/08/21 1556 07/08/21 1552 07/08/21 1552   (!) 142/86 98.1 °F (36.7 °C) Oral 84 16 94 % 5' 7\" (1.702 m) 175 lb (79.4 kg)     Nursing note and vitals reviewed. Constitutional: In no acute distress  HENT:      Head: Normocephalic      Ears: External ears normal.      Nose: Nose normal.     Mouth: Membrane mucosa moist   Eyes: No discharge. Neck: Supple. Trachea midline. Cardiovascular: Regular rate. Warm extremities  Pulmonary/Chest: Effort normal. No respiratory distress. Abdominal: Soft. No distension. Nontender  : Deferred  Rectal: Deferred   Musculoskeletal: Moves all extremities. No gross deformity. Neurological: Expressive aphasia present. Can answer yes or no. Some receptive aphasia. Will follow basic commands. Right arm cannot be lifted off the bed, which is his baseline. Right leg 4 out of 5 strength throughout which is also baseline. Right facial droop, also baseline. Patient acknowledges sensation in his face, arms, and legs to light palpation. Cannot cooperate with the rest of the exam.  Pupils equal round and reactive to light  Skin: Warm and dry. Psychiatric: Normal mood and affect. Behavior is normal.    Procedures      EKG  The Ekg interpreted by me in the absence of a cardiologist shows. Normal sinus rhythm. No specific ST changes appreciated.   HR 88  Qtc 491  No prior EKG for comparison      Radiology  CT HEAD WO CONTRAST    (Results Pending)   XR CHEST PORTABLE    (Results Pending)       Labs  Results for orders placed or performed during the hospital encounter of 07/08/21   CBC auto differential   Result Value Ref Range    WBC 6.6 4.0 - 11.0 K/uL    RBC 5.35 4.20 - 5.90 M/uL    Hemoglobin 15.9 13.5 - 17.5 g/dL    Hematocrit 45.3 40.5 - 52.5 %    MCV 84.6 80.0 - 100.0 fL    MCH 29.7 26.0 - 34.0 pg    MCHC 35.1 31.0 - 36.0 g/dL    RDW 13.8 12.4 - 15.4 %    Platelets 558 305 - 989 K/uL    MPV 7.7 5.0 - 10.5 fL    Neutrophils % 60.4 %    Lymphocytes % 26.7 %    Monocytes % 7.5 %    Eosinophils % 4.4 %    Basophils % 1.0 %    Neutrophils Absolute 4.0 1.7 - 7.7 K/uL    Lymphocytes Absolute 1.8 1.0 - 5.1 K/uL    Monocytes Absolute 0.5 0.0 - 1.3 K/uL    Eosinophils Absolute 0.3 0.0 - 0.6 K/uL    Basophils Absolute 0.1 0.0 - 0.2 K/uL   Comprehensive metabolic panel   Result Value Ref Range    Sodium 140 136 - 145 mmol/L    Potassium 3.9 3.5 - 5.1 mmol/L    Chloride 104 99 - 110 mmol/L    CO2 17 (L) 21 - 32 mmol/L    Anion Gap 19 (H) 3 - 16    Glucose 125 (H) 70 - 99 mg/dL    BUN 12 7 - 20 mg/dL    CREATININE 0.9 0.8 - 1.3 mg/dL    GFR Non-African American >60 >60    GFR African American >60 >60    Calcium 9.2 8.3 - 10.6 mg/dL    Total Protein 7.0 6.4 - 8.2 g/dL    Albumin 4.6 3.4 - 5.0 g/dL    Albumin/Globulin Ratio 1.9 1.1 - 2.2    Total Bilirubin 0.7 0.0 - 1.0 mg/dL    Alkaline Phosphatase 91 40 - 129 U/L    ALT 24 10 - 40 U/L    AST 19 15 - 37 U/L    Globulin 2.4 g/dL       Screenings   Elisha Coma Scale  Eye Opening: Spontaneous  Best Verbal Response: Oriented  Best Motor Response: Obeys commands  Brogan Coma Scale Score: 15       MDM and ED Course  This is a 77 y.o. male who presents to the ED for seizure-like activity. Patient unable to give us any history of this. Brother at bedside does give history. Patient has been in normal state of health.   His neuro exam is consistent with prior based upon his baseline right-sided weakness with aphasia from stroke

## 2021-07-08 NOTE — ED NOTES
Bed: 22  Expected date:   Expected time:   Means of arrival:   Comments:   Abel De La Cruz, ENRIKE  07/08/21 5249

## 2021-07-08 NOTE — ED NOTES
Patient identified as a positive fall risk on the ED triage fall screening. Patient placed in fall precautions which includes:  yellow fall risk bracelet on wrist, non skid shoes on feet, \"Be Safe\" sign placed on patient's door, and bed alarm placed under patient/alarm turned on. Patient instructed on importance of not getting out of bed or ambulating without assistance for safety. Medication reconciliation incomplete due to patient/family's inability to provide complete list.  Will need follow up.        Flo Lopez RN  07/08/21 3535

## 2021-07-08 NOTE — FLOWSHEET NOTE
[facility], [place] [address] [address]  [place], [address]   Phone: [phone]  Fax [phone]       Speech-Language Pathology  Daily Treatment Note     Date:  2021     Patient Name:  Carla Delgado            :  1955                       MRN: [id number]      Unable to proceed with the session due to the following event. There was no charge for the session. The patient was participating in [id number] [place] [address]. His session began at 1515. At 1526 the patient appeared to have a seizure as evidenced by convulsing, inability to respond and poor secretion management. The convulsions lasted ~ seconds. The Speech Therapist activated the chain of response and requested that 911 be called. The Speech and Occupational therapist assessed his breathing and pulse. The patient maintained breathing and a pulse throughout. The Speech and Occupational therapist maintained the patient's position in his chair due to lack of adequate head and neck control. OT attempted to call 911 from office phone but was unable to obtain a working line, despite using [phone]. The  outpatient rehab  was contacted by the present Speech Therapy . The   reportedly also attempted to call 911 but was also unsuccessful. The  reportedly had to make the 911 phone call via her cell phone. [email or web address] The vitals were: B/P: 176/91, HR: 63, O2: 96%. 911 arrived and transported the patient to the ED. OT called patient family and informed them of this incident and of patient's status. Speech Therapy contacted the patient's facility and informed his nurse.       María Melara MA, [address] [id number]  Speech-Language Pathologist  Phone #: [phone]  Fax #: [phone]

## 2021-07-09 LAB
EKG ATRIAL RATE: 88 BPM
EKG DIAGNOSIS: NORMAL
EKG P AXIS: 44 DEGREES
EKG P-R INTERVAL: 212 MS
EKG Q-T INTERVAL: 406 MS
EKG QRS DURATION: 108 MS
EKG QTC CALCULATION (BAZETT): 491 MS
EKG R AXIS: -28 DEGREES
EKG T AXIS: 9 DEGREES
EKG VENTRICULAR RATE: 88 BPM

## 2021-07-09 PROCEDURE — 93010 ELECTROCARDIOGRAM REPORT: CPT | Performed by: INTERNAL MEDICINE

## 2021-07-11 ENCOUNTER — HOSPITAL ENCOUNTER (EMERGENCY)
Age: 66
Discharge: HOME | End: 2021-07-11
Attending: EMERGENCY MEDICINE
Payer: MEDICARE

## 2021-07-11 ENCOUNTER — APPOINTMENT (OUTPATIENT)
Dept: GENERAL RADIOLOGY | Age: 66
End: 2021-07-11
Payer: MEDICARE

## 2021-07-11 ENCOUNTER — APPOINTMENT (OUTPATIENT)
Dept: CT IMAGING | Age: 66
End: 2021-07-11
Payer: MEDICARE

## 2021-07-11 VITALS
HEART RATE: 70 BPM | OXYGEN SATURATION: 99 % | DIASTOLIC BLOOD PRESSURE: 84 MMHG | WEIGHT: 175 LBS | RESPIRATION RATE: 20 BRPM | TEMPERATURE: 98.3 F | SYSTOLIC BLOOD PRESSURE: 134 MMHG | HEIGHT: 67 IN | BODY MASS INDEX: 27.47 KG/M2

## 2021-07-11 DIAGNOSIS — R10.31 RIGHT LOWER QUADRANT ABDOMINAL PAIN: Primary | ICD-10-CM

## 2021-07-11 LAB
A/G RATIO: 1.8 (ref 1.1–2.2)
ALBUMIN SERPL-MCNC: 4.4 G/DL (ref 3.4–5)
ALP BLD-CCNC: 95 U/L (ref 40–129)
ALT SERPL-CCNC: 20 U/L (ref 10–40)
ANION GAP SERPL CALCULATED.3IONS-SCNC: 10 MMOL/L (ref 3–16)
AST SERPL-CCNC: 16 U/L (ref 15–37)
BASOPHILS ABSOLUTE: 0 K/UL (ref 0–0.2)
BASOPHILS RELATIVE PERCENT: 0.7 %
BILIRUB SERPL-MCNC: 0.6 MG/DL (ref 0–1)
BILIRUBIN URINE: NEGATIVE
BLOOD, URINE: NEGATIVE
BUN BLDV-MCNC: 16 MG/DL (ref 7–20)
CALCIUM SERPL-MCNC: 9.4 MG/DL (ref 8.3–10.6)
CHLORIDE BLD-SCNC: 107 MMOL/L (ref 99–110)
CLARITY: CLEAR
CO2: 25 MMOL/L (ref 21–32)
COLOR: YELLOW
CREAT SERPL-MCNC: 0.8 MG/DL (ref 0.8–1.3)
EOSINOPHILS ABSOLUTE: 0.2 K/UL (ref 0–0.6)
EOSINOPHILS RELATIVE PERCENT: 3.8 %
GFR AFRICAN AMERICAN: >60
GFR NON-AFRICAN AMERICAN: >60
GLOBULIN: 2.5 G/DL
GLUCOSE BLD-MCNC: 86 MG/DL (ref 70–99)
GLUCOSE URINE: NEGATIVE MG/DL
HCT VFR BLD CALC: 44.1 % (ref 40.5–52.5)
HEMOGLOBIN: 15.5 G/DL (ref 13.5–17.5)
KETONES, URINE: NEGATIVE MG/DL
LEUKOCYTE ESTERASE, URINE: NEGATIVE
LIPASE: 30 U/L (ref 13–60)
LYMPHOCYTES ABSOLUTE: 1.1 K/UL (ref 1–5.1)
LYMPHOCYTES RELATIVE PERCENT: 22.5 %
MCH RBC QN AUTO: 29.3 PG (ref 26–34)
MCHC RBC AUTO-ENTMCNC: 35.1 G/DL (ref 31–36)
MCV RBC AUTO: 83.5 FL (ref 80–100)
MICROSCOPIC EXAMINATION: NORMAL
MONOCYTES ABSOLUTE: 0.5 K/UL (ref 0–1.3)
MONOCYTES RELATIVE PERCENT: 8.9 %
NEUTROPHILS ABSOLUTE: 3.3 K/UL (ref 1.7–7.7)
NEUTROPHILS RELATIVE PERCENT: 64.1 %
NITRITE, URINE: NEGATIVE
PDW BLD-RTO: 13.9 % (ref 12.4–15.4)
PH UA: 6.5 (ref 5–8)
PLATELET # BLD: 182 K/UL (ref 135–450)
PMV BLD AUTO: 7.6 FL (ref 5–10.5)
POTASSIUM REFLEX MAGNESIUM: 4.6 MMOL/L (ref 3.5–5.1)
PROTEIN UA: NEGATIVE MG/DL
RBC # BLD: 5.28 M/UL (ref 4.2–5.9)
SODIUM BLD-SCNC: 142 MMOL/L (ref 136–145)
SPECIFIC GRAVITY UA: 1.02 (ref 1–1.03)
TOTAL PROTEIN: 6.9 G/DL (ref 6.4–8.2)
URINE REFLEX TO CULTURE: NORMAL
URINE TYPE: 4
UROBILINOGEN, URINE: 1 E.U./DL
WBC # BLD: 5.1 K/UL (ref 4–11)

## 2021-07-11 PROCEDURE — 74177 CT ABD & PELVIS W/CONTRAST: CPT

## 2021-07-11 PROCEDURE — 80053 COMPREHEN METABOLIC PANEL: CPT

## 2021-07-11 PROCEDURE — 83690 ASSAY OF LIPASE: CPT

## 2021-07-11 PROCEDURE — 6360000002 HC RX W HCPCS: Performed by: EMERGENCY MEDICINE

## 2021-07-11 PROCEDURE — 81003 URINALYSIS AUTO W/O SCOPE: CPT

## 2021-07-11 PROCEDURE — 96374 THER/PROPH/DIAG INJ IV PUSH: CPT

## 2021-07-11 PROCEDURE — 99284 EMERGENCY DEPT VISIT MOD MDM: CPT

## 2021-07-11 PROCEDURE — 85025 COMPLETE CBC W/AUTO DIFF WBC: CPT

## 2021-07-11 PROCEDURE — 2580000003 HC RX 258: Performed by: EMERGENCY MEDICINE

## 2021-07-11 PROCEDURE — 71045 X-RAY EXAM CHEST 1 VIEW: CPT

## 2021-07-11 PROCEDURE — 6360000004 HC RX CONTRAST MEDICATION: Performed by: EMERGENCY MEDICINE

## 2021-07-11 RX ORDER — 0.9 % SODIUM CHLORIDE 0.9 %
1000 INTRAVENOUS SOLUTION INTRAVENOUS ONCE
Status: COMPLETED | OUTPATIENT
Start: 2021-07-11 | End: 2021-07-11

## 2021-07-11 RX ORDER — MORPHINE SULFATE 4 MG/ML
4 INJECTION, SOLUTION INTRAMUSCULAR; INTRAVENOUS ONCE
Status: COMPLETED | OUTPATIENT
Start: 2021-07-11 | End: 2021-07-11

## 2021-07-11 RX ADMIN — SODIUM CHLORIDE 1000 ML: 9 INJECTION, SOLUTION INTRAVENOUS at 13:05

## 2021-07-11 RX ADMIN — IOPAMIDOL 75 ML: 755 INJECTION, SOLUTION INTRAVENOUS at 14:15

## 2021-07-11 RX ADMIN — MORPHINE SULFATE 4 MG: 4 INJECTION, SOLUTION INTRAMUSCULAR; INTRAVENOUS at 13:05

## 2021-07-11 ASSESSMENT — PAIN SCALES - GENERAL: PAINLEVEL_OUTOF10: 0

## 2021-07-11 NOTE — ED NOTES
Bed: 15  Expected date:   Expected time:   Means of arrival:   Comments:  Med [address] Elshemar Avendano RN  07/11/21 9015

## 2021-07-11 NOTE — ED PROVIDER NOTES
[address] [facility]  ED  EMERGENCY DEPARTMENT ENCOUNTER      Pt Name: Ana Lambert  MRN: [id number]  Ginagfyaakov 1955  Date of evaluation: 7/11/2021  Provider: Jimmy Unger MD    CHIEF COMPLAINT       Chief Complaint   Patient presents with    Rib Pain     pt had seizure a few days ago. . pt points to his abdomin and abdomin is tender on palpation    Abdominal Pain     denies urinary sx         HISTORY OF PRESENT ILLNESS   (Location/Symptom, Timing/Onset, Context/Setting, Quality, Duration, Modifying Factors, Severity)  Note limiting factors. Ana Lambert is a 77 y.o. male with past medical history of hypertension, hyperlipidemia prior stroke with resultant expressive and receptive aphasia and dysarthria here today for possible abdominal pain    Patient states for the past few days he has had pain in his right abdomen. Denies vomiting or diarrhea. Denies fevers or chills. Denies dysuria or hematuria. Any further meaningful information very difficult to obtain from the patient as he has rather profound aphasia from prior stroke. This is not new. He denies any chest pain, cough or shortness of breath. He was seen in the emergency department on 7/8/2021 for presumed seizure. He has had no recurrent symptoms since that time    HPI    Nursing Notes were reviewed. REVIEW OF SYSTEMS    (2-9 systems for level 4, 10 or more for level 5)     Review of Systems    Please see HPI for pertinent positive and negative review of system findings. A full 10 system ROS was performed and otherwise negative. PAST MEDICAL HISTORY     Past Medical History:   Diagnosis Date    Cerebral artery occlusion with cerebral infarction ([address]     Hyperlipidemia     Hypertension     Seizures ([address]          SURGICAL HISTORY     History reviewed. No pertinent surgical history.       CURRENT MEDICATIONS       Previous Medications    ATORVASTATIN (LIPITOR) 80 MG TABLET    Take 80 mg by mouth daily    BACLOFEN (LIORESAL) 10 MG TABLET    TAKE 1 TABLET BY MOUTH THREE TIMES DAILY    BROMOCRIPTINE (PARLODEL) 2.5 MG TABLET    TAKE 2 TABLETS BY MOUTH TWICE DAILY    CLOPIDOGREL (PLAVIX) 75 MG TABLET    Take 75 mg by mouth daily    FLUOXETINE (PROZAC) 20 MG CAPSULE    TAKE 1 CAPSULE BY MOUTH DAILY    LEVETIRACETAM (KEPPRA) 500 MG TABLET    Take 1 tablet by mouth 2 times daily    LOSARTAN (COZAAR) 25 MG TABLET    Take 25 mg by mouth daily    PANTOPRAZOLE (PROTONIX) 40 MG TABLET    TAKE 1 TABLET BY MOUTH DAILY       ALLERGIES     Patient has no known allergies. FAMILY HISTORY     History reviewed. No pertinent family history. SOCIAL HISTORY       Social History     Socioeconomic History    Marital status: Unknown     Spouse name: None    Number of children: None    Years of education: None    Highest education level: None   Occupational History    None   Tobacco Use    Smoking status: Unknown If Ever Smoked    Smokeless tobacco: Never Used   Vaping Use    Vaping Use: Never used   Substance and Sexual Activity    Alcohol use: Not Currently    Drug use: Never    Sexual activity: None   Other Topics Concern    None   Social History Narrative    None     Social Determinants of Health     Financial Resource Strain:     Difficulty of Paying Living Expenses:    Food Insecurity:     Worried About Running Out of Food in the Last Year:     Ran Out of Food in the Last Year:    Transportation Needs:     Lack of Transportation (Medical):      Lack of Transportation (Non-Medical):    Physical Activity:     Days of Exercise per Week:     Minutes of Exercise per Session:    Stress:     Feeling of Stress :    Social Connections:     Frequency of Communication with Friends and Family:     Frequency of Social Gatherings with Friends and Family:     Attends [employer] Services:     Active Member of Clubs or Organizations:     Attends Club or Organization Meetings:     Marital Status:    Intimate Partner Violence:     Fear of Current or Ex-Partner:     Emotionally Abused:     Physically Abused:     Sexually Abused:        SCREENINGS               PHYSICAL EXAM    (up to 7 for level 4, 8 or more for level 5)     ED Triage Vitals [07/11/21 1242]   BP Temp Temp src Pulse Resp SpO2 Height Weight   134/80 98.3 °F (36.8 °C) -- 67 16 100 % 5' 7\" (1.702 m) 175 lb (79.4 kg)       Physical Exam    General appearance:  Cooperative. No acute distress. Skin:  Warm. Dry. Eye:  Extraocular movements intact. Ears, nose, mouth and throat:  Oral mucosa moist,  Neck:  Trachea midline. Heart:  Regular rate and rhythm  Perfusion:  intact  Respiratory:  Lungs clear to auscultation bilaterally. Respirations nonlabored. Abdominal:   Non distended. Nontender  Neurological:  Alert and oriented x 3. Moves all extremities spontaneously.   Expressive aphasia does follow commands appropriately and attempts to answer questions appropriately  Musculoskeletal:   Normal ROM, no deformities          Psychiatric:  Normal mood      DIAGNOSTIC RESULTS       Labs Reviewed   CBC WITH AUTO DIFFERENTIAL    Narrative:     Performed at:  [place] [address] [facility]   Phone [phone]   COMPREHENSIVE METABOLIC PANEL W/ REFLEX TO MG FOR LOW K    Narrative:     Performed at:  [place] [address] [facility]   Phone [phone]   LIPASE    Narrative:     Performed at:  [place] [address] [facility]   Phone [phone]   URINE RT REFLEX TO CULTURE    Narrative:     Performed at:  [place] [address] [facility]   Phone [phone]       Interpretation per the Radiologist below, if obtained/available at the time of this note:    CT ABDOMEN PELVIS W IV CONTRAST Additional Contrast? None   Preliminary Result   An acute process is not identified. Normal appendix. Prostate gland enlargement. Punctate nonobstructing right renal calculus. Atherosclerosis. XR CHEST PORTABLE   Final Result   No focal airspace consolidation or overt edema/CHF. All other labs/imaging were within normal range or not returned as of this dictation. EMERGENCY DEPARTMENT COURSE and DIFFERENTIAL DIAGNOSIS/MDM:   Vitals:    Vitals:    07/11/21 1242 07/11/21 1352 07/11/21 1405   BP: 134/80 (!) 155/96 (!) 140/75   Pulse: 67 71 68   Resp: 16 16 16   Temp: 98.3 °F (36.8 °C)     SpO2: 100% 100% 100%   Weight: 175 lb (79.4 kg)     Height: 5' 7\" (1.702 m)         Patient presents emergency department today complaining of some abdominal pain. Is well-appearing. Significant aphasia but consistent with baseline. Vital signs stable. Patient notes right-sided abdominal pain but has a very soft abdomen. His labs were completely unremarkable but given the language difficulty due to his underlying aphasia perform a CT scan showing no acute abnormality. At this time the patient is resting comfortably feels improved I do feel that he can be discharged home safely    MDM    CONSULTS     None    Critical Care:   None    REASSESSMENT          PROCEDURE     Unless otherwise noted below, none     Procedures      FINAL IMPRESSION      1. Right lower quadrant abdominal pain            DISPOSITION/PLAN   DISPOSITION          PATIENT REFERRED TO:  [facility]  ED  [address] [facility] [address]  [address]  [phone]    As needed      DISCHARGE MEDICATIONS:  New Prescriptions    No medications on file     Controlled Substances Monitoring:     No flowsheet data found.     (Please note that portions of this note were completed with a voice recognition program.  Efforts were made to edit the dictations but occasionally words are mis-transcribed.)    Farida Mosley MD (electronically signed)  Attending Emergency Physician            Kathleen Culver MD Isaias  07/11/21 [address]  [address], MD  07/11/21 1416

## 2021-07-13 ENCOUNTER — APPOINTMENT (OUTPATIENT)
Dept: SPEECH THERAPY | Age: 66
End: 2021-07-13
Payer: MEDICARE

## 2021-07-13 ENCOUNTER — APPOINTMENT (OUTPATIENT)
Dept: PHYSICAL THERAPY | Age: 66
End: 2021-07-13
Payer: MEDICARE

## 2021-07-13 ENCOUNTER — HOSPITAL ENCOUNTER (OUTPATIENT)
Dept: OCCUPATIONAL THERAPY | Age: 66
Setting detail: THERAPIES SERIES
End: 2021-07-13
Payer: MEDICARE

## 2021-07-15 ENCOUNTER — HOSPITAL ENCOUNTER (OUTPATIENT)
Dept: PHYSICAL THERAPY | Age: 66
Setting detail: THERAPIES SERIES
Discharge: HOME | End: 2021-07-15
Payer: MEDICARE

## 2021-07-15 ENCOUNTER — HOSPITAL ENCOUNTER (OUTPATIENT)
Dept: SPEECH THERAPY | Age: 66
Setting detail: THERAPIES SERIES
Discharge: HOME | End: 2021-07-15
Payer: MEDICARE

## 2021-07-15 ENCOUNTER — HOSPITAL ENCOUNTER (OUTPATIENT)
Dept: OCCUPATIONAL THERAPY | Age: 66
Setting detail: THERAPIES SERIES
Discharge: HOME | End: 2021-07-15
Payer: MEDICARE

## 2021-07-15 PROCEDURE — 97140 MANUAL THERAPY 1/> REGIONS: CPT

## 2021-07-15 PROCEDURE — 92507 TX SP LANG VOICE COMM INDIV: CPT

## 2021-07-15 PROCEDURE — 97110 THERAPEUTIC EXERCISES: CPT

## 2021-07-15 PROCEDURE — 97112 NEUROMUSCULAR REEDUCATION: CPT

## 2021-07-15 NOTE — FLOWSHEET NOTE
[facility], [place] [address] [address], [address]  Phone: [phone]  Fax [phone]      Speech-Language Pathology  Daily Treatment Note    Date:  7/15/2021    Patient Name:  Oumou Childress    :  1955  MRN: [id number]  Restrictions/Precautions:  Limited communication ability due to significant Aphasia  Treatment Diagnosis:    Codes     Aphasia as late effect of cerebrovascular accident (CVA)    -  Primary   Insurance/Certification information: Medicare A & B; [place]   Referring Physician:  Severiano Figueroa. Batsheva Sorto MD  Plan of care signed (Y/N):  Y  Visit# / total visits:     Pain level: No reported or suspected pain noted     Progress Note: []  Yes  [x]  No  Next due by: Visit #10: 4/10 or 21    Subjective: The pt was pleasant and good spirits. He had no recollection of the apparent seizure event that occurred last session. The pt was not admitted to the hospital after the event but was observed. His CT of head wo contrast results are included below. CT OF THE HEAD WITHOUT CONTRAST  2021 4:42 pm:  Impression   1. No evidence of acute intracranial abnormality. 2. Asymmetric atrophic change involving the left cerebral hemisphere with   visualization of several areas of encephalomalacia involving left basal   ganglia/centrosylvian region, left temporoparietal region, and high left   parietal lobe as described above. Objective:   Short-term Goals:  Goal 1: NEW GOAL: The pt will say indivdual phrase and short sentences, in response to therapy tasks, with 90% accuracy, mod cues:  - 75% (3/4), mod-max cues and extra time; targeted via having him describe basic daily task sequencing pictures. Goal 2: NEW GOAL: The pt will complete basic confrontational and responsive naming with 90% accuracy, min cues  - Confrontational: 11% (1/9) min cues; Needed more mod cues to retrieve the correct word.   - Responsive: 30% (3/10)

## 2021-07-15 NOTE — FLOWSHEET NOTE
[facility], [facility], [address] [place] [address] Dr  Phone: [phone]  Fax [phone]    Physical Therapy Daily Treatment Note    Date:  7/15/2021    Patient Name:  Xavi Lorenzo    :  1955  MRN: [id number]  Restrictions/Precautions:    Medical/Treatment Diagnosis Information:  · Diagnosis: CVA, R hemiparesis  Insurance/Certification information:  PT Insurance Information: Medicare  Physician Information:  Referring Practitioner: Sherman Clay MD  Plan of care signed (Y/N):  N  Visit# / total visits:      G-Code (if applicable):          LEFS     Medicare Cap (if applicable):  9663 = total amount used, updated 7/15/2021    Time in:   1:30  Timed Treatment: 30  Total Treatment Time:  35  ________________________________________________________________________________________    Pain Level:    /10  SUBJECTIVE: Pt reports that the last time he was in the clinic with SLP he had a seizure. He was taken to the hospital. Notes that he is okay but is now dealing with increased RLE pain and fatigue. OBJECTIVE: [id number]    Exercise/Equipment Resistance/Repetitions Other comments   NuStep 5 min           Supine leg press  Supine hip flexion 3 min  3 min Manual resistance   Clams #1 2x10 BLE    Bridge    Single limb     PHE    Bent knee x10  x10 RLE In side-lying this session   PKF  In sidelying this session   Supine clams  yellow   Supine hip neuro re-ed 3 min RLE    LAQ x10 RLE   With TB assist   Seated hamstring curls x10 With maxislide        Side-lying knee extension/flexion     [place] ADD ball squeeze    With hip flexion   x10    Backwards walking alternating with resistance walking in parallel barsRed band for forwards walking, 3 # weight removed intermediate through to increase stride length. Focusing on pushoff and stride length.    Standing Hip Flexion with facilitation  manual facilitation   Slant board    With hamstring stretch   TG    Standing step over object    [address] 4\"   Side-stepping UE support   Standing hip abduction. Other Therapeutic Activities:      Manual Treatments:    STM to R quad and ITB    Modalities:      Test/Measurements:         ASSESSMENT:    Pt tolerated session fairly. Able to complete all table exercise this session but requested to stop early due to fatigue. Treatment/Activity Tolerance:   [x]Patient tolerated treatment well [] Patient limited by fatique  []Patient limited by pain [] Patient limited by other medical complications  [] Other:     Goals:    Short term goals  Time Frame for Short term goals: 6 weeks  Short term goal 1: pt will be indep in HEP  Short term goal 2: pt will increase R DF strength by 1/3 muscle grade  Short term goal 3: pt will ambulate with increased glut activation RLE  Short term goal 4: pt will decrease TUG time 10 sec  Short term goal 5: pt will increase LEFS score by 10 points to hit MCID           Plan: [x] Continue per plan of care [] Alter current plan (see comments)   [] Plan of care initiated [] Hold pending MD visit [] Discharge      Plan for Next Session:  Biomechanical correction of problems as they present. Facilitate correct muscle firing patterns and activation with appropriate activities. Progress patient as tolerated.      Re-Certification Due Date:         Signature:  Suyapa Potts, PT , PT

## 2021-07-15 NOTE — FLOWSHEET NOTE
[facility], [facility], [address]   Phone: [phone]  Fax [phone]      Outpatient Occupational Therapy     [x] Daily Treatment Note   [] Progress Note   [] Discharge Note    Date:  7/15/2021    Patient Name:  William Feldman         YOB: 1955    Medical Diagnosis/ICD10[de-identified]   CVA/I63.9, Right hemiparesis, Right side neglect, Expressive aphasia  Treatment Diagnosis: Impaired self care status due to decreased functional use of RUE/RLE  Referring Physician: Dr. Kiersten Newton    Referral Date:  3/15/2021  Onset Date:  Nov 2019  Visits Allowed/Insurance/Certification Information:   Medicare A/B, AARP  Restrictions/Precautions Right vic, aphasia    Plan of care sent to provider:    [x]Faxed (date:   ) []Co-signature    (attempts: 1[x] 2 []3[])        Plan of care signed:    [x]Yes (date:5/5/21; 6/2/21;  6/30/21  )           []No       Progress Note covers period from (if applicable):    [x]NA    []From 7/1/21         To         Next Progress Note due:   7/24/2021    Visit# / total visits: 4/8, 8/8, 7/8; 7/12    Functional Outcome Measure:   3/25/2021: QuickDASH: 42  6/29/2021: QuickDASH: 42    Subjective: Pt arrived alone. Pt ambulating with SBQC.       Pain level: denies    Plan for Next Session:  WBing  NMES - elbow/wrist ext  PROM in supine    Self ROM  Cooking tasks  - one handed techniques      Assessment of UE tone/spasticity:    Date: 3/25/2021 5/4/21   6/1/21  6/8/21 6/29/21   Shoulder flexors 2 1  2  1+  2   Internal rotators 1  1+ 1+  1+  2   External rotators 0  0 0   1 1   Elbow flexors 1+  (clonus)  1  (at end range)  1+ 1+  1  (at end range)   Elbow extensor 1+ 1+   1  1 1   Supinators 0  0  0  0 0   Pronators 1+  1  1+  1+ 1+   Wrist flexors 2  2  3  2 2-3   Wrist extensors 0 0   0  0 0   Digit flexors 2-3  2 2  3  2-3   Digit extensors 0  0  0  0 0    Comments: Reports periods of decreased tone periodically throughout the day.      Modified Rian Scale  0    No increase in muscle tone  1    Slight increase in muscle tone, manifested by a catch and release or by minimal        resistance at the end of the range of motion when the affected part(s) is moved in        flexion or extension  1+ Slight increase in muscle tone, manifested by a catch, followed by minimal        resistance throughout the remainder (less than half) of the ROM  2    More marked increase in muscle tone through most of the ROM, but        affected part(s) easily moved  3    Considerable increase in muscle tone, passive movement difficult  4    Affected part(s) rigid in flexion or extension    Objective:     Exercises:    Exercises in bold performed in department today. Items not bolded are carried forward from prior visits for continuity of the record. Exercise/Equipment Resistance/Repetitions HEP Other comments      ADL/IADL TRAINING     Safety pictures Pt correctly ID safety concern in 19 of 19 pictures. Meal Prep Pt fried and egg. Step-by-step verbal cues provided. · Unable to crack egg  · Able to flip egg with non-dominant hand with increased time  · Able to move egg from frying pan to plate with non-dominant hand with increased time  · Reports he does not typically cook eggs at home    Pt prepared instant mac n cheese this date. Pt familiar with steps to complete task. Demo difficulty with:   · Opening pkgs with one hand   · Seeing fill line indicator inside cup  · Selecting numbers on microwave     Adaptations:  · Simplified written instructions  · dycem under cup  · Scissor to open cheese pk      Plan for cont training with one handed techniques for kitchen tasks    Educated on adapted cutting board (pt may have one already) and one handed can/jar openers.       LB dressing Doff/don right shoe and AFO with modified independence as pt required increased time    [] Increased effort this date d/t new shoes    Per PT, pt no longer requires AFO 4/15/21     Functional Mobility Pt ambulating with SBQC, w/o AFO - with mod ind  OT<>bathroom  OT>parking lot []    ADLs Pt performed toileting tasks with modified indepedence     Modified indep with dof/don overhead shirts x2  Standing to dof, seated to don []      Brake reaction time RLE: 20.0 seconds  LLE: average of 0.804 seconds    OT expressed concerns about processing and response time and explained that in-clinic assessment would be limited by aphasia [] If pt wishes to pursue a driving, he will require hand controls and may need to learn to use LLE to operate brake and accelerator. Recommended in-car driving training at [address] and THERAPEUTIC ACTIVITY      Visual status Pt's visual fields intact  Tracking intact    Pt reports right eye \"isn't good\"  Wears reading glasses occasionally      Education on stroke recovery Role of OT  Tone and management of tone  Typical progression of UE function after stoke     Reviewed recommendation for meeting with PM&R MD to discuss tone management options   [] Pt's sister-in-law verbalized understanding   Pt will benefit from reinforcement d/t communication deficits.     Discussed PM&R consult with pt's sister in law during phone call on 5/27/21      WBing RUE, fully ext  EOM  15 min     Inflatable arm splint in place to maintain elbow ext    Weight shifting toward right for increased proprioceptive input [] Denies pain with WBing    NMES in place on elbow ext during WBing   Right UE movement Scap squeeze, 5x  shld shrug, 5x    Completed in front of mirror for visual feed back  Contraction noted with shrug, slight movement noted  Contraction and movement noted with squeeze      Use of inflatable arm splint to isolate shoulder muscles for facilitating normal movement patterns and reduce compensation however - Difficulty isolating desired muscles - significant compensation noted      EOM - use of table top at shld ht  Active:Horizontal adduction  Active assisted: Horizontal abduction    Supine - Active assisted:   shld flex , 5x   shld ext, 5x  Elbow flex, 5x  Elbow ext, 5x   Reviewed scap squeeze and shld shrug to HEP - recommended in front of mirror    Noted pt initiating movement, even against gravity. Increased effort required - cues for breathing      Pt able to initiate movements but requiring facilitation to move through full ROM and remain in normal movement pattern    Demo flex synergy, clemente with attempts at active elbow flex     Mirror Therapy Educated pt on purpose of and research re: mirror therapy    Pt participated in mirror therapy for 10 min  Completing exercises with left hand while watching reflection  2-3 cues to cont to watch reflexion  Pt  Will benefit from cont education d/t comprehension may be limited by aphasia. Arm skate Seated EOM, using rolling/hieght adjustable table    Pushing cones of edge of table (16x)    Demo active movement for: (flexor synergy)  Horizontal adduction, elbow flex and shld ext    Active assist for:  Horizontal abduction, elbow ext, shld flex [] Increased effort required - cues for breathing        THERAPEUTIC EXERCISE and MANUAL TECHNIQUES        PROM RUE  - Supine    Shld flexion to ~105o  Shld abduction to ~90-95o  ER to ~25o  Elbow ext  Wrist ext with increased effort  Digit ext with increased effort   [] Pt's sister in law trained on PROM techniques for shld flex, shld abd and elbow ext. She returned demo and verbalized understanding. Emailed program via [address] [place] [address].       Self ROM shld flexion while supine  Instructed to avoid flex >90  Pt returned demo [] Reviewed for HEP     Scap mobs  - right EOM - All directions    Supine - scap adduction    Side lying - scap depression, upward and downward rotation []       MODALITIES     NMES Right elbow ext  Intensity: 19  Time: 12 min    Right wrist/digit ext  Intensity: 20  Time: 15 min    Attempted grasp/release exercise during NMES to wrist/digit ext however attempts at active movement increasing flexor tone [] Empi unit    Symmetrical/synchronous  10s on/ 5s off  35 PPS  300 wavelength  2.0 ramp time       []       SPLINTING       RHS Pt reports pre-bettina resting hand splint at home as recommended by home OT. Pt reports difficult to don independently []      Home Exercise Program: PROM by caregiver, self ROM, Shoulder/scap exercises with visual feedback    Treatment/Activity Tolerance:    Patients response to treatment:   [x]Patient tolerated treatment well []Patient limited by fatigue   []Patient limited by pain  []Patient limited by other medical complications   []Other:     Assessment:   Pt tolerating PROM/AAROM for improved muscle elasticity and joint integrity. Participating in [place] for proprioceptive and sensory input, facilitating ROM in RUE. Denied pain in RUE. Cont per OT poc.        GOALS:  Patient goal: \" Get right arm working. Move back into home. \"     Short Term Goals:  to be met in 2 weeks (by 7/13/2021)     Pt will demonstrate active assisted right shoulder flexion to 50 degrees or better, gravity reduced, while rating pain < 2/10 for improved ROM for functional task performance. - CONT GOAL, 6/29/21     Pt will demonstrate 15o degrees of active assisted elbow flexion against gravity, while rating pain < 0/10 for improved ROM for functional task performance. - GOAL MET, 6/29/21     Pt and caregiver will independently verbalize 2 techniques to obtain optimal positioning of on right vic arm to ensure approximation of glenohumeral joint and decrease risk of complications from subluxation.- GOAL MET, 6/29/21    Pt will participate in assessment of brake reaction time with each foot. - GOAL MET, 6/3/21    Pt will complete moderate level meal prep with supervision and modified instructions as needed. - Goal Added 6/29/21    Pt will ID safety concerns in 4/5 safety awareness photos.  - GOAL MET, 21       Long Term Goals:  to be met in 4 weeks (by 2021)     Pt will demonstrate active assisted right shoulder flexion to 50 degrees, against gravity, while rating pain < 2/10 for improved ROM for functional task performance. - Progressing, CONT GOAL, 21     Pt will demonstrate 25o degrees of right active elbow flexion,against gravity, while rating pain < 0/10 for improved ROM for functional task performance. - Progressing, CONT GOAL, 21     Pt will demonstrate 20o degrees of right active elbow extension while rating pain < 0/10 for improved ROM for functional task performance. - Progressing, CONT GOAL, 21     Pt will tolerate WBing for 10 min through RUE with elbow and wrist extension for tone management in prep for active assisted movement.  - GOAL MET, 21      Pt will demo independence with ability to acheive WBing position on RUE with elbow and wrist extension to facilitate participation in [place] at home. - CONT GOAL, 21       Prognosis: [x]Good   [x]Fair   []Poor    Patient Requires Follow-up:  [x]Yes  []No    Plan: []Plan of care initiated     [x]Continue per plan of care    [] Alter current plan (see comments)    []Hold pending MD visit []Discharge      ---  ---- --- ---- --- ---- --- ---- --- ---- --- ---- --- ---- --- ---- --- ---- --- --- ---    Therapeutic Exercise/Home Exercise Program:    minutes    Therapeutic Activity:   minutes    ADL Training:     minutes      Neuromuscular Re-Education:  40 minutes      Manual Therapy:  15 minutes    Splintin minutes     Modalities:    minutes    Time in:       3238   Time Out:   1500     Timed Code Treatment Minutes:  55  minutes        Total Treatment Time:    55 minutes           Electronically signed by:  Issac Gordon OT, OTR/BETINA

## 2021-07-20 ENCOUNTER — HOSPITAL ENCOUNTER (OUTPATIENT)
Dept: PHYSICAL THERAPY | Age: 66
Setting detail: THERAPIES SERIES
Discharge: HOME | End: 2021-07-20
Payer: MEDICARE

## 2021-07-20 ENCOUNTER — HOSPITAL ENCOUNTER (OUTPATIENT)
Dept: SPEECH THERAPY | Age: 66
Setting detail: THERAPIES SERIES
Discharge: HOME | End: 2021-07-20
Payer: MEDICARE

## 2021-07-20 ENCOUNTER — HOSPITAL ENCOUNTER (OUTPATIENT)
Dept: OCCUPATIONAL THERAPY | Age: 66
Setting detail: THERAPIES SERIES
Discharge: HOME | End: 2021-07-20
Payer: MEDICARE

## 2021-07-20 PROCEDURE — 92507 TX SP LANG VOICE COMM INDIV: CPT

## 2021-07-20 PROCEDURE — 97112 NEUROMUSCULAR REEDUCATION: CPT

## 2021-07-20 PROCEDURE — 97110 THERAPEUTIC EXERCISES: CPT

## 2021-07-20 PROCEDURE — 97140 MANUAL THERAPY 1/> REGIONS: CPT

## 2021-07-20 NOTE — FLOWSHEET NOTE
[facility], [facility], [address] [place] [facility], [address]   Phone: [phone]  Fax [phone]      Outpatient Occupational Therapy     [x] Daily Treatment Note   [] Progress Note   [] Discharge Note    Date:  7/20/2021    Patient Name:  Enrique Zarco         YOB: 1955    Medical Diagnosis/ICD10[de-identified]   CVA/I63.9, Right hemiparesis, Right side neglect, Expressive aphasia  Treatment Diagnosis: Impaired self care status due to decreased functional use of RUE/RLE  Referring Physician: Dr. Trent Lindo    Referral Date:  3/15/2021  Onset Date:  Nov 2019  Visits Allowed/Insurance/Certification Information:   Medicare A/B, AARP  Restrictions/Precautions Right vic, aphasia    Plan of care sent to provider:    [x]Faxed (date:   ) []Co-signature    (attempts: 1[x] 2 []3[])        Plan of care signed:    [x]Yes (date:5/5/21; 6/2/21;  6/30/21  )           []No       Progress Note covers period from (if applicable):    [x]NA    []From 7/1/21         To         Next Progress Note due:   7/24/2021    Visit# / total visits: 5/8, 8/8, 7/8; 7/12    Functional Outcome Measure:   3/25/2021: QuickDASH: 42  6/29/2021: QuickDASH: 42    Subjective: Pt arrived alone. Pt ambulating with SBQC. Per pt's sister-in-law, Pt has appointment with Dr. Artie Kerr on August 23rd.     Pain level: denies    Plan for Next Session:  WBing  NMES - elbow/wrist ext  PROM in supine    Self ROM  Cooking tasks  - one handed techniques      Assessment of UE tone/spasticity:    Date: 3/25/2021 5/4/21   6/1/21  6/8/21 6/29/21   Shoulder flexors 2 1  2  1+  2   Internal rotators 1  1+ 1+  1+  2   External rotators 0  0 0   1 1   Elbow flexors 1+  (clonus)  1  (at end range)  1+ 1+  1  (at end range)   Elbow extensor 1+ 1+   1  1 1   Supinators 0  0  0  0 0   Pronators 1+  1  1+  1+ 1+   Wrist flexors 2  2  3  2 2-3   Wrist extensors 0 0   0  0 0   Digit flexors 2-3  2 2  3  2-3   Digit extensors 0  0  0  0 0    Comments: Reports periods of decreased tone periodically throughout the day.      Modified Rian Scale  0    No increase in muscle tone  1    Slight increase in muscle tone, manifested by a catch and release or by minimal        resistance at the end of the range of motion when the affected part(s) is moved in        flexion or extension  1+ Slight increase in muscle tone, manifested by a catch, followed by minimal        resistance throughout the remainder (less than half) of the ROM  2    More marked increase in muscle tone through most of the ROM, but        affected part(s) easily moved  3    Considerable increase in muscle tone, passive movement difficult  4    Affected part(s) rigid in flexion or extension    Objective:     Exercises:    Exercises in bold performed in department today. Items not bolded are carried forward from prior visits for continuity of the record. Exercise/Equipment Resistance/Repetitions HEP Other comments      ADL/IADL TRAINING     Safety pictures Pt correctly ID safety concern in 19 of 19 pictures. Meal Prep Pt fried and egg. Step-by-step verbal cues provided. · Unable to crack egg  · Able to flip egg with non-dominant hand with increased time  · Able to move egg from frying pan to plate with non-dominant hand with increased time  · Reports he does not typically cook eggs at home    Pt prepared instant mac n cheese this date. Pt familiar with steps to complete task. Demo difficulty with:   · Opening pkgs with one hand   · Seeing fill line indicator inside cup  · Selecting numbers on microwave     Adaptations:  · Simplified written instructions  · dycem under cup  · Scissor to open cheese pk      Plan for cont training with one handed techniques for kitchen tasks    Educated on adapted cutting board (pt may have one already) and one handed can/jar openers.       LB dressing Doff/don right shoe and AFO with modified independence as pt required increased time    [] Increased effort this date d/t new shoes    Per PT, pt no longer requires AFO 4/15/21     Functional Mobility Pt ambulating with SBQC, w/o AFO - with mod ind  OT<>bathroom  OT>parking lot []    ADLs Pt performed toileting tasks with modified indepedence     Modified indep with dof/don overhead shirts x2  Standing to dof, seated to don []      Brake reaction time RLE: 20.0 seconds  LLE: average of 0.804 seconds    OT expressed concerns about processing and response time and explained that in-clinic assessment would be limited by aphasia [] If pt wishes to pursue a driving, he will require hand controls and may need to learn to use LLE to operate brake and accelerator. Recommended in-car driving training at [address] and THERAPEUTIC ACTIVITY      Visual status Pt's visual fields intact  Tracking intact    Pt reports right eye \"isn't good\"  Wears reading glasses occasionally      Education on stroke recovery Role of OT  Tone and management of tone  Typical progression of UE function after stoke     Reviewed recommendation for meeting with PM&R MD to discuss tone management options   [] Pt's sister-in-law verbalized understanding   Pt will benefit from reinforcement d/t communication deficits.     Discussed PM&R consult with pt's sister in law during phone call on 5/27/21      WBing RUE, fully ext  EOM  10 min     Inflatable arm splint in place to maintain elbow ext    Weight shifting toward right for increased proprioceptive input [] Denies pain with WBing    NMES in place on elbow ext during WBing   Right UE movement Scap squeeze, 5x  shld shrug, 5x    Completed in front of mirror for visual feed back  Contraction noted with shrug, slight movement noted  Contraction and movement noted with squeeze      Use of inflatable arm splint to isolate shoulder muscles for facilitating normal movement patterns and reduce compensation however - Difficulty isolating desired min    Right wrist/digit ext  Intensity: 20  Time: 15 min    Attempted grasp/release exercise during NMES to wrist/digit ext however attempts at active movement increasing flexor tone [] Empi unit    Symmetrical/synchronous  10s on/ 5s off  35 PPS  300 wavelength  2.0 ramp time       []       SPLINTING       RHS Pt reports pre-bettina resting hand splint at home as recommended by home OT. Pt reports difficult to don independently []      Home Exercise Program: PROM by caregiver, self ROM, Shoulder/scap exercises with visual feedback    Treatment/Activity Tolerance:    Patients response to treatment:   [x]Patient tolerated treatment well []Patient limited by fatigue   []Patient limited by pain  []Patient limited by other medical complications   []Other:     Assessment:   Pt tolerating PROM/AAROM for improved muscle elasticity and joint integrity. Participating in [place] for proprioceptive and sensory input, facilitating ROM in RUE. Denied pain in RUE. Cont per OT poc.        GOALS:  Patient goal: \" Get right arm working. Move back into home. \"     Short Term Goals:  to be met in 2 weeks (by 7/13/2021)     Pt will demonstrate active assisted right shoulder flexion to 50 degrees or better, gravity reduced, while rating pain < 2/10 for improved ROM for functional task performance. - CONT GOAL, 6/29/21     Pt will demonstrate 15o degrees of active assisted elbow flexion against gravity, while rating pain < 0/10 for improved ROM for functional task performance. - GOAL MET, 6/29/21     Pt and caregiver will independently verbalize 2 techniques to obtain optimal positioning of on right vic arm to ensure approximation of glenohumeral joint and decrease risk of complications from subluxation.- GOAL MET, 6/29/21    Pt will participate in assessment of brake reaction time with each foot. - GOAL MET, 6/3/21    Pt will complete moderate level meal prep with supervision and modified instructions as needed.  - Goal Added 21    Pt will ID safety concerns in 4/5 safety awareness photos. - GOAL MET, 21       Long Term Goals:  to be met in 4 weeks (by 2021)     Pt will demonstrate active assisted right shoulder flexion to 50 degrees, against gravity, while rating pain < 2/10 for improved ROM for functional task performance. - Progressing, CONT GOAL, 21     Pt will demonstrate 25o degrees of right active elbow flexion,against gravity, while rating pain < 0/10 for improved ROM for functional task performance. - Progressing, CONT GOAL, 21     Pt will demonstrate 20o degrees of right active elbow extension while rating pain < 0/10 for improved ROM for functional task performance. - Progressing, CONT GOAL, 21     Pt will tolerate WBing for 10 min through RUE with elbow and wrist extension for tone management in prep for active assisted movement.  - GOAL MET, 21      Pt will demo independence with ability to acheive WBing position on RUE with elbow and wrist extension to facilitate participation in [employer] at home. - CONT GOAL, 21       Prognosis: [x]Good   [x]Fair   []Poor    Patient Requires Follow-up:  [x]Yes  []No    Plan: []Plan of care initiated     [x]Continue per plan of care    [] Alter current plan (see comments)    []Hold pending MD visit []Discharge      ---  ---- --- ---- --- ---- --- ---- --- ---- --- ---- --- ---- --- ---- --- ---- --- --- ---    Therapeutic Exercise/Home Exercise Program:    minutes    Therapeutic Activity:   minutes    ADL Training:     minutes      Neuromuscular Re-Education:  30 minutes      Manual Therapy:  15 minutes    Splintin minutes     Modalities:    minutes    Time in:       1130   Time Out:   3265     Timed Code Treatment Minutes:  45  minutes        Total Treatment Time:  45   minutes           Electronically signed by:  Jen Jeffery OT, OTR/L

## 2021-07-20 NOTE — FLOWSHEET NOTE
[facility], [facility], [address] [facility], [address]  Phone: [phone]  Fax [phone]    Physical Therapy Daily Treatment Note    Date:  2021    Patient Name:  Heather Chappell    :  1955  MRN: [id number]  Restrictions/Precautions:    Medical/Treatment Diagnosis Information:  · Diagnosis: CVA, R hemiparesis  Insurance/Certification information:  PT Insurance Information: Medicare  Physician Information:  Referring Practitioner: Lilly Houston MD  Plan of care signed (Y/N):  N  Visit# / total visits:      G-Code (if applicable):          LEFS     Medicare Cap (if applicable):  2975= total amount used, updated 2021    Time in:  9:45  Timed Treatment: 45  Total Treatment Time:  45  ________________________________________________________________________________________    Pain Level:    /10  SUBJECTIVE: Pt reports that he is doing better and feeling less fatigue. OBJECTIVE: [id number]    Exercise/Equipment Resistance/Repetitions Other comments   NuStep 5 min           Supine leg press  Supine hip flexion 3 min  3 min Manual resistance   Clams #1 2x10 BLE    Bridge    Single limb     PHE    Bent knee x10  x10 RLE In side-lying this session   PKF  In sidelying this session   Supine clams  yellow   Supine hip neuro re-ed 3 min RLE    LAQ x10 RLE   With TB assist   Seated hamstring curls x10 With maxislide        Side-lying knee extension/flexion     Hooklying ADD ball squeeze    With hip flexion   x10    Backwards walking alternating with resistance walking in parallel bars6 lapsRed band for forwards walking, 3 # weight removed custodial through to increase stride length. Focusing on pushoff and stride length.    Standing Hip Flexion with facilitation x20 manual facilitation   Slant board    With hamstring stretch   TG    Standing step over object    Minisquat cueing   FSU 4\"   Side-stepping 4 laps // Rodger Lock support   Standing hip abduction. Other Therapeutic Activities:      Manual Treatments:    STM to R quad and ITB    Modalities:      Test/Measurements:         ASSESSMENT:    Pt tolerated session fairly. Functional activities continue focusing on increasing push off and left leg step length to focus on patient goal of improved gait speed and control. Patient required min for standing hip flexion against gravity, though had increased endurance this visit. Treatment/Activity Tolerance:   [x]Patient tolerated treatment well [] Patient limited by fatique  []Patient limited by pain [] Patient limited by other medical complications  [] Other:     Goals:    Short term goals  Time Frame for Short term goals: 6 weeks  Short term goal 1: pt will be indep in HEP  Short term goal 2: pt will increase R DF strength by 1/3 muscle grade  Short term goal 3: pt will ambulate with increased glut activation RLE  Short term goal 4: pt will decrease TUG time 10 sec  Short term goal 5: pt will increase LEFS score by 10 points to hit MCID           Plan: [x] Continue per plan of care [] Alter current plan (see comments)   [] Plan of care initiated [] Hold pending MD visit [] Discharge      Plan for Next Session:  Biomechanical correction of problems as they present. Facilitate correct muscle firing patterns and activation with appropriate activities. Progress patient as tolerated.      Re-Certification Due Date:         Signature:  Alannah Botello, PT , PT

## 2021-07-20 NOTE — FLOWSHEET NOTE
[facility], [facility],  Evanvenice Chaney, [address]  Phone: [phone]  Fax [phone]      Speech-Language Pathology  Daily Treatment Note    Date:  2021    Patient Name:  Oumou Childress    :  1955  MRN: [id number]  Restrictions/Precautions:  Limited communication ability due to significant Aphasia  Treatment Diagnosis:    Codes     Aphasia as late effect of cerebrovascular accident (CVA)    -  Primary   Insurance/Certification information: Medicare A & B; [place]   Referring Physician:  Severiano Figueroa. Batsheva Sorto MD  Plan of care signed (Y/N):  Y  Visit# / total visits:     Pain level: 2-3 pain in right leg indicated via use of gestures and patient counting with therapist     Progress Note: []  Yes  [x]  No  Next due by: Visit #10: 5/10 or 21    Subjective: The pt was his typically pleasant self. He indicated that he feels like his speech and language are back to the level it was at prior to his seizure. Objective:   Short-term Goals:  Goal 1: NEW GOAL: The pt will say indivdual phrase and short sentences, in response to therapy tasks, with 90% accuracy, mod cues:  - 85% (11/13), mod cues and extra time; targeted via open ended questions. Goal 2: NEW GOAL: The pt will complete basic confrontational and responsive naming with 90% accuracy, min cues  - Confrontational: 60% (16/10) min cues  - Responsive: 60% (3/5) min cues    Goal 3: NEW GOAL:The pt will demonstrate reading comprehension of single phrases/short sentences with 90% accuracy, mod cues  - 80% (4/5) targeted via a 5 phrase matching task, mod cues. Goal 4: The pt will use an AAC speech generating device to answer given basic questions with 80% accuracy, mod-max cues:  - GOAL DISCONTINUED. The pt has not been using or bring his iPad and has not been wanting to use AAC communication due to improved verbal communication. Goal 5:  The pt will complete basic confrontational and responsive naming with 75% accuracy, mod-max cues:  -  GOAL MET    Goal 6: The pt will say indivdual phrase and short sentences, in response to therapy tasks, with 70% accuracy, mod-max cues:  - GOAL MET    Goal 7: The pt will write single basic words with 50% accuracy, mod-max cues:  - DISCONTINUE THIS GOAL DUE TO LACK OF PROGRESS      Other Treatments:      Assessment:   Significant progress toward all goals compared to last session. Plan:   Continued Speech Therapy services 2 x week for 6 weeks. Total Treatment Time / Charges     Time in Time out Total Time / units   Cognitive Tx         Speech Tx 1050 1130 40 min / 1 unit   Dysphagia Tx           Signature:     Iliana Rachel MA, [place]  [id number]  Speech-Language Pathologist  Phone #: [phone]  Fax #: [phone]

## 2021-07-22 ENCOUNTER — HOSPITAL ENCOUNTER (OUTPATIENT)
Dept: PHYSICAL THERAPY | Age: 66
Setting detail: THERAPIES SERIES
Discharge: HOME | End: 2021-07-22
Payer: MEDICARE

## 2021-07-22 ENCOUNTER — HOSPITAL ENCOUNTER (OUTPATIENT)
Dept: SPEECH THERAPY | Age: 66
Setting detail: THERAPIES SERIES
Discharge: HOME | End: 2021-07-22
Payer: MEDICARE

## 2021-07-22 ENCOUNTER — HOSPITAL ENCOUNTER (OUTPATIENT)
Dept: OCCUPATIONAL THERAPY | Age: 66
Setting detail: THERAPIES SERIES
Discharge: HOME | End: 2021-07-22
Payer: MEDICARE

## 2021-07-22 PROCEDURE — 97112 NEUROMUSCULAR REEDUCATION: CPT

## 2021-07-22 PROCEDURE — 97535 SELF CARE MNGMENT TRAINING: CPT

## 2021-07-22 PROCEDURE — 97110 THERAPEUTIC EXERCISES: CPT

## 2021-07-22 PROCEDURE — 97140 MANUAL THERAPY 1/> REGIONS: CPT

## 2021-07-22 PROCEDURE — 92507 TX SP LANG VOICE COMM INDIV: CPT

## 2021-07-22 NOTE — FLOWSHEET NOTE
[facility], [facility], [address] [facility], [address] [place] [address]   Phone: [phone]  Fax [phone]      Outpatient Occupational Therapy     [x] Daily Treatment Note   [] Progress Note   [] Discharge Note    Date:  7/22/2021    Patient Name:  Parisa Diamond         YOB: 1955    Medical Diagnosis/ICD10[de-identified]   CVA/I63.9, Right hemiparesis, Right side neglect, Expressive aphasia  Treatment Diagnosis: Impaired self care status due to decreased functional use of RUE/RLE  Referring Physician: Dr. Arcelia Granados    Referral Date:  3/15/2021  Onset Date:  Nov 2019  Visits Allowed/Insurance/Certification Information:   Medicare A/B, [employer]  Restrictions/Precautions Right vic, aphasia    Plan of care sent to provider:    [x]Faxed (date:   ) []Co-signature    (attempts: 1[x] 2 []3[])        Plan of care signed:    [x]Yes (date:5/5/21; 6/2/21;  6/30/21  )           []No       Progress Note covers period from (if applicable):    [x]NA    []From 7/1/21         To         Next Progress Note due:   7/29/2021    Visit# / total visits: 6/8, 8/8, 7/8; 7/12    Functional Outcome Measure:   3/25/2021: QuickDASH: 42  6/29/2021: QuickDASH: 42    Subjective: Pt arrived alone. Pt ambulating with SBQC.     Pain level: denies    Plan for Next Session:  Re-assess ROM, tone  WBing  NMES - elbow/wrist ext  PROM in supine    Self ROM      Assessment of UE tone/spasticity:    Date: 3/25/2021 5/4/21   6/1/21  6/8/21 6/29/21   Shoulder flexors 2 1  2  1+  2   Internal rotators 1  1+ 1+  1+  2   External rotators 0  0 0   1 1   Elbow flexors 1+  (clonus)  1  (at end range)  1+ 1+  1  (at end range)   Elbow extensor 1+ 1+   1  1 1   Supinators 0  0  0  0 0   Pronators 1+  1  1+  1+ 1+   Wrist flexors 2  2  3  2 2-3   Wrist extensors 0 0   0  0 0   Digit flexors 2-3  2 2  3  2-3   Digit extensors 0  0  0  0 0    Comments: Reports periods of decreased tone periodically throughout AFO 4/15/21     Functional Mobility Pt ambulating with SBQC, w/o AFO - with mod ind  OT<>bathroom  OT>parking lot []    ADLs Pt performed toileting tasks with modified indepedence     Modified indep with dof/don overhead shirts x2  Standing to dof, seated to don []      Brake reaction time RLE: 20.0 seconds  LLE: average of 0.804 seconds    OT expressed concerns about processing and response time and explained that in-clinic assessment would be limited by aphasia [] If pt wishes to pursue a driving, he will require hand controls and may need to learn to use LLE to operate brake and accelerator. Recommended in-car driving training at [address] and THERAPEUTIC ACTIVITY      Visual status Pt's visual fields intact  Tracking intact    Pt reports right eye \"isn't good\"  Wears reading glasses occasionally      Education on stroke recovery Role of OT  Progress to date and limitations to progress  Tone and management of tone  Typical progression of UE function after stoke     Reviewed recommendation for meeting with PM&R MD to discuss tone management options   []   Pt will benefit from reinforcement d/t communication deficits.          WBing RUE, fully ext  EOM  5 min     Inflatable arm splint in place to maintain elbow ext    Weight shifting toward right for increased proprioceptive input [] Denies pain with WBing    NMES in place on elbow ext during WBing   Right UE movement Scap squeeze, 5x  shld shrug, 5x    Completed in front of mirror for visual feed back  Contraction noted with shrug, slight movement noted  Contraction and movement noted with squeeze      Use of inflatable arm splint to isolate shoulder muscles for facilitating normal movement patterns and reduce compensation however - Difficulty isolating desired muscles - significant compensation noted      EOM - use of table top at shld ht  Active:Horizontal adduction  Active assisted: Horizontal abduction    Supine - Active assisted:   shld flex , 2x   shld ext, 2x  Elbow flex, 2x  Elbow ext, 2x   Reviewed scap squeeze and shld shrug to HEP - recommended in front of mirror    Noted pt initiating movement, even against gravity. Increased effort required - cues for breathing      Pt able to initiate movements but requiring facilitation to move through full ROM and remain in normal movement pattern    Demo flex synergy, clemente with attempts at active elbow flex     Mirror Therapy Educated pt on purpose of and research re: mirror therapy    Pt participated in mirror therapy for 10 min  Completing exercises with left hand while watching reflection  2-3 cues to cont to watch reflexion  Pt  Will benefit from cont education d/t comprehension may be limited by aphasia. Arm skate Seated EOM, using rolling/hieght adjustable table    Pushing cones of edge of table (16x)    Demo active movement for: (flexor synergy)  Horizontal adduction, elbow flex and shld ext    Active assist for:  Horizontal abduction, elbow ext, shld flex [] Increased effort required - cues for breathing        THERAPEUTIC EXERCISE and MANUAL TECHNIQUES        PROM RUE  - Supine    Shld flexion to ~110-115o  Shld abduction to ~90-95o  ER to ~25o  Elbow ext  Wrist ext with increased effort  Digit ext with increased effort   [] Pt's sister in law trained on PROM techniques for shld flex, shld abd and elbow ext. She returned demo and verbalized understanding. Emailed program via [employer].       Self ROM shld flexion while supine  Instructed to avoid flex >90  Pt returned demo [] Reviewed for HEP     Scap mobs  - right EOM - All directions    Supine - scap adduction    Side lying - scap depression, upward and downward rotation []       MODALITIES     NMES Right elbow ext  Intensity: 19  Time: 12 min    Right wrist/digit ext  Intensity: 20  Time: 15 min    Attempted grasp/release exercise during NMES to wrist/digit ext however attempts at active movement increasing flexor tone [] Empi unit    Symmetrical/synchronous  10s on/ 5s off  35 PPS  300 wavelength  2.0 ramp time       []       SPLINTING       RHS Pt reports pre-bettina resting hand splint at home as recommended by home OT. Pt reports difficult to don independently []      Home Exercise Program: PROM by caregiver, self ROM, Shoulder/scap exercises with visual feedback    Treatment/Activity Tolerance:    Patients response to treatment:   [x]Patient tolerated treatment well []Patient limited by fatigue   []Patient limited by pain  []Patient limited by other medical complications   []Other:     Assessment:   Continued discussion re: limitations d/t tone and recommendation for consult with Dr. Sergio No for tone management. Pt seems reluctant to possibility for injections to manage tone. Pt tolerating PROM/AAROM for improved muscle elasticity and joint integrity. Participating in [employer] for proprioceptive and sensory input, facilitating ROM in RUE. Denied pain in RUE. Cont per OT poc.        GOALS:  Patient goal: \" Get right arm working. Move back into home. \"     Short Term Goals:  to be met in 2 weeks (by 7/13/2021)     Pt will demonstrate active assisted right shoulder flexion to 50 degrees or better, gravity reduced, while rating pain < 2/10 for improved ROM for functional task performance. - CONT GOAL, 6/29/21     Pt will demonstrate 15o degrees of active assisted elbow flexion against gravity, while rating pain < 0/10 for improved ROM for functional task performance. - GOAL MET, 6/29/21     Pt and caregiver will independently verbalize 2 techniques to obtain optimal positioning of on right vic arm to ensure approximation of glenohumeral joint and decrease risk of complications from subluxation.- GOAL MET, 6/29/21    Pt will participate in assessment of brake reaction time with each foot. - GOAL MET, 6/3/21    Pt will complete moderate level meal prep with supervision and modified instructions as needed.  - Goal Added 21    Pt will ID safety concerns in 4/5 safety awareness photos. - GOAL MET, 21       Long Term Goals:  to be met in 4 weeks (by 2021)     Pt will demonstrate active assisted right shoulder flexion to 50 degrees, against gravity, while rating pain < 2/10 for improved ROM for functional task performance. - Progressing, CONT GOAL, 21     Pt will demonstrate 25o degrees of right active elbow flexion,against gravity, while rating pain < 0/10 for improved ROM for functional task performance. - Progressing, CONT GOAL, 21     Pt will demonstrate 20o degrees of right active elbow extension while rating pain < 0/10 for improved ROM for functional task performance. - Progressing, CONT GOAL, 21     Pt will tolerate WBing for 10 min through RUE with elbow and wrist extension for tone management in prep for active assisted movement.  - GOAL MET, 21      Pt will demo independence with ability to acheive WBing position on RUE with elbow and wrist extension to facilitate participation in [id number] at home. - CONT GOAL, 21       Prognosis: [x]Good   [x]Fair   []Poor    Patient Requires Follow-up:  [x]Yes  []No    Plan: []Plan of care initiated     [x]Continue per plan of care    [] Alter current plan (see comments)    []Hold pending MD visit []Discharge      ---  ---- --- ---- --- ---- --- ---- --- ---- --- ---- --- ---- --- ---- --- ---- --- --- ---    Therapeutic Exercise/Home Exercise Program:    minutes    Therapeutic Activity:   minutes    ADL Training:    15  minutes      Neuromuscular Re-Education:  15 minutes      Manual Therapy:  15 minutes    Splintin minutes     Modalities:    minutes    Time in:       1130   Time Out:   3671     Timed Code Treatment Minutes:  45  minutes        Total Treatment Time:  45   minutes           Electronically signed by:  Clifton Duncan OT, OTR/L

## 2021-07-22 NOTE — FLOWSHEET NOTE
[facility]. and Therapy, [facility],  Miriam [place], [address]   Phone: [phone]  Fax [phone]      Speech-Language Pathology  Daily Treatment Note    Date:  2021    Patient Name:  Heather Langford    :  1955  MRN: [id number]  Restrictions/Precautions:  Limited communication ability due to significant Aphasia  Treatment Diagnosis:    Codes     Aphasia as late effect of cerebrovascular accident (CVA)    -  Primary   Insurance/Certification information: Medicare A & B; [address]   Referring Physician:  Ronny Hernandez. Marialuisa Hooper MD  Plan of care signed (Y/N):  Y  Visit# / total visits:     Pain level: 2-3 pain in right leg indicated via use of gestures and patient counting with therapist     Progress Note: []  Yes  [x]  No  Next due by: Visit #10: 6/10 or 21    Subjective: The pt was in good spirits throughout. Objective:   Short-term Goals:  Goal 1: NEW GOAL: The pt will say indivdual phrase and short sentences, in response to therapy tasks, with 90% accuracy, mod cues:  - 67% (10/15), mod cues and extra time; targeted via picture descriptions. Goal 2: NEW GOAL: The pt will complete basic confrontational and responsive naming with 90% accuracy, min cues  - Confrontational: 60% (6/10) min cues  - Responsive: 90% (9/10) min cues    Goal 3: NEW GOAL:The pt will demonstrate reading comprehension of single phrases/short sentences with 90% accuracy, mod cues  - 80% (4/5) targeted via a 5 phrase matching task, mod cues. Goal 4: The pt will use an AAC speech generating device to answer given basic questions with 80% accuracy, mod-max cues:  - GOAL DISCONTINUED. The pt has not been using or bring his iPad and has not been wanting to use AAC communication due to improved verbal communication. Goal 5:  The pt will complete basic confrontational and responsive naming with 75% accuracy, mod-max cues:  -  GOAL MET    Goal 6: The pt will say indivdual phrase and short sentences, in response to therapy tasks, with 70% accuracy, mod-max cues:  - GOAL MET    Goal 7: The pt will write single basic words with 50% accuracy, mod-max cues:  - DISCONTINUE THIS GOAL DUE TO LACK OF PROGRESS      Other Treatments:      Assessment:   The pt continues to demonstrate significant progress in therapy. Plan:   Continued Speech Therapy services 2 x week for 6 weeks. Total Treatment Time / Charges     Time in Time out Total Time / units   Cognitive Tx         Speech Tx 1045 1130 45 min / 1 unit   Dysphagia Tx           Signature:     Sergio Wilson MA, Merrill Cano  IT[id number]  Speech-Language Pathologist  Phone #: [phone]  Fax #: [phone]

## 2021-07-22 NOTE — FLOWSHEET NOTE
[facility]  [facility]. and Therapy, [facility], [address] [place] [facility], [address]  Phone: [phone]  Fax [phone]    Physical Therapy Daily Treatment Note    Date:  2021    Patient Name:  Nicole Rodgers    :  1955  MRN: [id number]  Restrictions/Precautions:    Medical/Treatment Diagnosis Information:  · Diagnosis: CVA, R hemiparesis  Insurance/Certification information:  PT Insurance Information: Medicare  Physician Information:  Referring Practitioner: To Quintana MD  Plan of care signed (Y/N):  N  Visit# / total visits:      G-Code (if applicable):          LEFS     Medicare Cap (if applicable):  0511= total amount used, updated 2021    Time in:  9:45  Timed Treatment: 45  Total Treatment Time:  45  ________________________________________________________________________________________    Pain Level:    /10  SUBJECTIVE: Pt reports that hi thigh is tight and painful. Notes that this was about gone prior to his seizure. Now, everything has returned and is worse. OBJECTIVE: [id number]    Exercise/Equipment Resistance/Repetitions Other comments   NuStep 5 min           Supine leg press  Supine hip flexion 3 min  3 min Manual resistance   Clams #1 2x10 BLE    Bridge    Single limb     PHE    Bent knee x10  x10 RLE In side-lying this session   PKF  In sidelying this session   Supine clams  yellow   Supine hip neuro re-ed 3 min RLE    LAQ x10 RLE   With TB assist   Seated hamstring curls x10 With maxislide        Side-lying knee extension/flexion     Hooklying ADD ball squeeze    With hip flexion   x10    Backwards walking alternating with resistance walking in parallel bars6 lapsRed band for forwards walking, 3 # weight removed skilled nursing through to increase stride length. Focusing on pushoff and stride length.    Standing Hip Flexion with facilitation x20 manual facilitation   Slant board    With hamstring stretch   TG    Standing step over object    [address] 4\"   Side-stepping 4 laps // barsUE support   Standing hip abduction. Other Therapeutic Activities:      Manual Treatments:    STM to R quad and ITB    Modalities:      Test/Measurements:         ASSESSMENT:    Pt tolerated session fairly. Functional activities continue focusing on increasing push off and left leg step length to focus on patient goal of improved gait speed and control. Patient required CGA-min for standing hip flexion against gravity, though had increased endurance this visit. Treatment/Activity Tolerance:   [x]Patient tolerated treatment well [] Patient limited by fatique  []Patient limited by pain [] Patient limited by other medical complications  [] Other:     Goals:    Short term goals  Time Frame for Short term goals: 6 weeks  Short term goal 1: pt will be indep in HEP  Short term goal 2: pt will increase R DF strength by 1/3 muscle grade  Short term goal 3: pt will ambulate with increased glut activation RLE  Short term goal 4: pt will decrease TUG time 10 sec  Short term goal 5: pt will increase LEFS score by 10 points to hit MCID           Plan: [x] Continue per plan of care [] Alter current plan (see comments)   [] Plan of care initiated [] Hold pending MD visit [] Discharge      Plan for Next Session:  Biomechanical correction of problems as they present. Facilitate correct muscle firing patterns and activation with appropriate activities. Progress patient as tolerated.      Re-Certification Due Date:         Signature:  Christine Rocha, PT , PT

## 2021-07-26 ENCOUNTER — APPOINTMENT (OUTPATIENT)
Dept: SPEECH THERAPY | Age: 66
End: 2021-07-26
Payer: MEDICARE

## 2021-07-27 ENCOUNTER — HOSPITAL ENCOUNTER (OUTPATIENT)
Dept: PHYSICAL THERAPY | Age: 66
Setting detail: THERAPIES SERIES
Discharge: HOME | End: 2021-07-27
Payer: MEDICARE

## 2021-07-27 ENCOUNTER — HOSPITAL ENCOUNTER (OUTPATIENT)
Dept: OCCUPATIONAL THERAPY | Age: 66
Setting detail: THERAPIES SERIES
Discharge: HOME | End: 2021-07-27
Payer: MEDICARE

## 2021-07-27 ENCOUNTER — HOSPITAL ENCOUNTER (OUTPATIENT)
Dept: SPEECH THERAPY | Age: 66
Setting detail: THERAPIES SERIES
Discharge: HOME | End: 2021-07-27
Payer: MEDICARE

## 2021-07-27 PROCEDURE — 92507 TX SP LANG VOICE COMM INDIV: CPT

## 2021-07-27 PROCEDURE — 97032 APPL MODALITY 1+ESTIM EA 15: CPT

## 2021-07-27 PROCEDURE — 97112 NEUROMUSCULAR REEDUCATION: CPT

## 2021-07-27 PROCEDURE — 97110 THERAPEUTIC EXERCISES: CPT

## 2021-07-27 NOTE — PROGRESS NOTES
[facility], [facility], [address], [address]   Phone: [phone]  Fax [phone]        Speech Therapy Progress Note         The following patient has been evaluated for therapy services. Please review the attached summary of the patient's plan of care, and verify that you agree with plan for additional therapy services at this time. Thank you for the referral of this patient. Please sign the attached certification form. Physician signature_______________________ Date________________      Fax to: [facility] [phone]         Date: 2021        Patient Name:  Paula Pack    :  1955  MRN: [id number]  Restrictions/Precautions:  Limited communication ability due to significant Aphasia  Treatment Diagnosis:     Codes     Aphasia as late effect of cerebrovascular accident ([address]   Insurance/Certification information: Medicare A & B; [address]   Referring Physician:  Tam Soto. Thi Boggs MD  Plan of care signed (Y/N):  Y  Visit# / total visits:     Pain level: No reported or suspected pain noted           Time Period for Report:  21 to 21  Cancels/No-shows to date:  None    Plan of Care/Treatment to date:  [x] Speech-Language Evaluation/Treatment    [] Dysphagia Evaluation/Treatment        [] Dysphagia Treatment via Neuromuscular Electrical Stimulation (NMES)   [] Modified Barium Swallowing Study  [] Fiberoptic Endoscopic Evaluation of Swallowing (FEES)    [] Cognitive-Linguistic Skills Development  [] Voice evaluation and Treatment      [] Evaluation, modification, and Training of Voice Prosthetic     [] Evaluation for Speech-Generating Augmentative and Alternative Communication Device   [] Therapeutic Services for the use of Speech-Generating Device.    [] Other:     Significant Findings At Last Visit/Comments:    Subjective:  ·  Pt continues to have improved verbal expression in natural communication settings vs more structured tasks. He is pleasant and cooperative during tx sessions, becomes frustrated at times d/t expressive language deficits. Objective:   Observation: Further increase in length and complexity of spoken utterances    Assessment:   Summary: *Of note, pt has had a seizure within the last month. The pt continues to make progress across all short-term goals. Consistent mod cues are required during structured verbal expression tasks. Spelling, reading and recognition of letters continues to be a weakness for the pt. Considering the pt's ongoing progress made across tx sessions, as well as, pt's participation and motivation, continued speech therapy services 2 x week for 4 weeks is recommended.  Patient's response to treatment: Pleasant and cooperative. Progress towards goals:    Short-term Goals:  Goal 1: NEW GOAL: The pt will say indivdual phrase and short sentences, in response to therapy tasks, with 90% accuracy, mod cues:  -  69% mod cues; on average across recent tx sessions.      Goal 2: NEW GOAL: The pt will complete basic confrontational and responsive naming with 90% accuracy, min cues  - Confrontational: 57%, mod cues; on average across recent tx sessions  - Responsive: 69%, mod cues; on average across recent tx sessions     Goal 3: NEW GOAL:The pt will demonstrate reading comprehension of single phrases/short sentences with 90% accuracy, mod cues  - 80%, mod cues; on average across recent tx sessions     Goal 4: The pt will use an AAC speech generating device to answer given basic questions with 80% accuracy, mod-max cues:  - GOAL DISCONTINUED. The pt has not been using or bring his iPad and has not been wanting to use AAC communication due to improved verbal communication.     Goal 5:  The pt will complete basic confrontational and responsive naming with 75% accuracy, mod-max cues:  -  GOAL MET     Goal 6: The pt will say

## 2021-07-27 NOTE — FLOWSHEET NOTE
[facility], [facility], [address] [place] [address]  Phone: [phone]  Fax [phone]    Physical Therapy Daily Treatment Note    Date:  2021    Patient Name:  Leopoldo Campanile    :  1955  MRN: [id number]  Restrictions/Precautions:    Medical/Treatment Diagnosis Information:  · Diagnosis: CVA, R hemiparesis  Insurance/Certification information:  PT Insurance Information: Medicare  Physician Information:  Referring Practitioner: Renetta Wiseman MD  Plan of care signed (Y/N):  N  Visit# / total visits:      G-Code (if applicable):          LEFS     Medicare Cap (if applicable):  5323= total amount used, updated 2021    Time in:  10:50  Timed Treatment: 48  Total Treatment Time:  48  ________________________________________________________________________________________    Pain Level:    /10  SUBJECTIVE: Pt reports that he doesn't feel like it is \"his leg. \"    OBJECTIVE: Sari Mckee    Exercise/Equipment Resistance/Repetitions Other comments   NuStep 5 min           Supine leg press  Supine hip flexion 3 min  3 min Manual resistance   Clams #1 x10 RLE    Bridge    Single limb x15  x15    PHE    Bent knee x10  x10 RLE In side-lying this session   PKF  In sidelying this session   Supine clams  yellow   Supine hip neuro re-ed 3 min RLE    LAQ    With TB assist   Seated hamstring curls x10 With maxislide        Side-lying knee extension/flexion     Hooklying ADD ball squeeze    With hip flexion   x10    Backwards walking alternating with resistance walking in parallel bars6 lapsRed band for forwards walking, 3 # weight removed residential through to increase stride length. Focusing on pushoff and stride length.    Standing Hip Flexion with facilitation x20 manual facilitation   Slant board 3x30\"   With hamstring stretch   TG    Standing step over object    Minisquat [id number]   FSU x15 BLE4\"   Side-stepping 4 laps // barsUE support   Standing hip abduction. Other Therapeutic Activities:      Manual Treatments:        Modalities:      Test/Measurements:         ASSESSMENT:    Pt tolerated session fairly. Functional activities continue focusing on increasing push off and left leg step length to focus on patient goal of improved gait speed and control. Patient required CGA-min for standing hip flexion against gravity, though had increased endurance this visit. Treatment/Activity Tolerance:   [x]Patient tolerated treatment well [] Patient limited by fatique  []Patient limited by pain [] Patient limited by other medical complications  [] Other:     Goals:    Short term goals  Time Frame for Short term goals: 6 weeks  Short term goal 1: pt will be indep in HEP  Short term goal 2: pt will increase R DF strength by 1/3 muscle grade  Short term goal 3: pt will ambulate with increased glut activation RLE  Short term goal 4: pt will decrease TUG time 10 sec  Short term goal 5: pt will increase LEFS score by 10 points to hit MCID           Plan: [x] Continue per plan of care [] Alter current plan (see comments)   [] Plan of care initiated [] Hold pending MD visit [] Discharge      Plan for Next Session:  Biomechanical correction of problems as they present. Facilitate correct muscle firing patterns and activation with appropriate activities. Progress patient as tolerated.      Re-Certification Due Date:         Signature:  Mohsen Reyes, PT , PT

## 2021-07-27 NOTE — FLOWSHEET NOTE
Comments: Reports periods of decreased tone periodically throughout the day.      Modified Rian Scale  0    No increase in muscle tone  1    Slight increase in muscle tone, manifested by a catch and release or by minimal        resistance at the end of the range of motion when the affected part(s) is moved in        flexion or extension  1+ Slight increase in muscle tone, manifested by a catch, followed by minimal        resistance throughout the remainder (less than half) of the ROM  2    More marked increase in muscle tone through most of the ROM, but        affected part(s) easily moved  3    Considerable increase in muscle tone, passive movement difficult  4    Affected part(s) rigid in flexion or extension    Range of Motion     AROM                     RIGHT  RIGHT RIGHT RIGHT RIGHT    LEFT      Date 3/25/2021  5/4/21 6/1/21 6/29/21  7/27/21   3/25/2021     Shoulder:   flex x  x x x x    WNL                        ABD ~75o 70-75o  See comments See comments  see  comments   WNL     Elbow:      ext/flex x See comments    See comments   see  comments   WNL     Forearm:  sup/pron x  x   x  x   WNL     Wrist:       ext/flex x  x   x x    WNL     Digits: x  x   x  see  comments    WNL        Comments:   PROM of RUE WFL with increased time d/t flexor tone (see Modified Rian Scale below)      Demo initiation of active shrug on right with visual cues, moving though approx 25% of range  Demo full range for active scap squeeze     Attempts at RUE active movement result in right scap retraction, shld extension/abduction (to ~40o) with compensation at upper trap, elbow flexion, pronation and wrist/digit flex.      While EOM, pt able to move from 124o ext to 85o flex with facilitation to avoid compensating at shoulder. Trace contraction noted with attempts at elbow ext.   Flexor tone increases with active movement, limiting active elbow ext.       No active movement noted in wrist.    Able to flex digits on performed toileting tasks with modified indepedence     Modified indep with dof/don overhead shirts x2  Standing to dof, seated to don []      Brake reaction time RLE: 20.0 seconds  LLE: average of 0.804 seconds    OT expressed concerns about processing and response time and explained that in-clinic assessment would be limited by aphasia [] If pt wishes to pursue a driving, he will require hand controls and may need to learn to use LLE to operate brake and accelerator. Recommended in-car driving training at [facility] 171 RE-EDU and THERAPEUTIC ACTIVITY      Visual status Pt's visual fields intact  Tracking intact    Pt reports right eye \"isn't good\"  Wears reading glasses occasionally      Education on stroke recovery Role of OT  Progress to date and limitations to progress  Tone and management of tone  Typical progression of UE function after stoke     Reviewed recommendation for meeting with PM&R MD to discuss tone management options   []   Pt will benefit from reinforcement d/t communication deficits.          WBing RUE, fully ext  EOM  15 min     Inflatable arm splint in place to maintain elbow ext    Weight shifting toward right for increased proprioceptive input [] Denies pain with WBing    NMES in place on elbow ext during WBing   Right UE movement Scap squeeze, 5x  shld shrug, 5x    Completed in front of mirror for visual feed back  Contraction noted with shrug, slight movement noted  Contraction and movement noted with squeeze      Use of inflatable arm splint to isolate shoulder muscles for facilitating normal movement patterns and reduce compensation however - Difficulty isolating desired muscles - significant compensation noted      EOM - use of table top at [facility] ht  Active:Horizontal adduction  Active assisted: Horizontal abduction    Supine - Active assisted:   shld flex , 2x   shld ext, 2x  Elbow flex, 2x  Elbow ext, 2x   Reviewed scap squeeze and shld shrug to HEP - recommended in front of mirror    Noted pt initiating movement, even against gravity. Demo flex synergy, clemente with attempts at active elbow flex          Increased effort required - cues for breathing      Pt able to initiate movements but requiring facilitation to move through full ROM and remain in normal movement pattern       Mirror Therapy Educated pt on purpose of and research re: mirror therapy    Pt participated in mirror therapy for 10 min  Completing exercises with left hand while watching reflection  2-3 cues to cont to watch reflexion  Pt  Will benefit from cont education d/t comprehension may be limited by aphasia. Arm skate Seated EOM, using rolling/hieght adjustable table    Pushing cones of edge of table (16x)    Demo active movement for: (flexor synergy)  Horizontal adduction, elbow flex and shld ext    Active assist for:  Horizontal abduction, elbow ext, shld flex [] Increased effort required - cues for breathing        THERAPEUTIC EXERCISE and MANUAL TECHNIQUES        PROM RUE  - Supine    Shld flexion to ~110-115o  Shld abduction to ~90-95o  ER to ~25o  Elbow ext  Wrist ext with increased effort  Digit ext with increased effort   [] Pt's sister in law trained on PROM techniques for shld flex, shld abd and elbow ext. She returned demo and verbalized understanding. Emailed program via [employer].       Self ROM shld flexion while supine  Instructed to avoid flex >90  Pt returned demo [] Reviewed for HEP     Scap mobs  - right EOM - All directions    Supine - scap adduction    Side lying - scap depression, upward and downward rotation []       MODALITIES     NMES Right elbow ext  Intensity: 18  Time: 12 min    Right wrist/digit ext  Intensity: 15  Time: 15 min    Attempted grasp/release exercise during NMES to wrist/digit ext however attempts at active movement increasing flexor tone [] Empi unit    Symmetrical/synchronous  10s on/ 5s off  35 PPS  300 wavelength  2.0 ramp time       [] SPLINTING       RHS Pt reports pre-bettina resting hand splint at home as recommended by home OT. Pt reports difficult to don independently []      Home Exercise Program: PROM by caregiver, self ROM, Shoulder/scap exercises with visual feedback    Treatment/Activity Tolerance:    Patients response to treatment:   [x]Patient tolerated treatment well []Patient limited by fatigue   []Patient limited by pain  []Patient limited by other medical complications   []Other:     Assessment:   Continued discussion re: limitations d/t tone and recommendation for consult with Dr. Santos Cheng for tone management. Participating in [place] for proprioceptive and sensory input, facilitating ROM in RUE. Demo temporary reduction in flexor tone in right elbow/wrost/hand after WBing. Denied pain in RUE. Cont per OT poc.        GOALS:  Patient goal: \" Get right arm working. Move back into home. \"     Short Term Goals:  to be met in 2 weeks (by 7/13/2021)     Pt will demonstrate active assisted right shoulder flexion to 50 degrees or better, gravity reduced, while rating pain < 2/10 for improved ROM for functional task performance. - GOAL NOT MET, 7/27/21     Pt will demonstrate 15o degrees of active assisted elbow flexion against gravity, while rating pain < 0/10 for improved ROM for functional task performance. - GOAL MET, 6/29/21    Pt will complete moderate level meal prep with supervision and modified instructions as needed. - GOAL NOT MET, 7/27/21    Pt will ID safety concerns in 4/5 safety awareness photos. - GOAL MET, 7/6/21       Long Term Goals:  to be met in 4 weeks (by 7/27/2021)     Pt will demonstrate active assisted right shoulder flexion to 50 degrees, against gravity, while rating pain < 2/10 for improved ROM for functional task performance. - GOAL NOT MET, 7/27/21     Pt will demonstrate 25o degrees of right active elbow flexion,against gravity, while rating pain < 0/10 for improved ROM for functional task performance.  - GOAL Partially MET, 21     Pt will demonstrate 20o degrees of right active elbow extension while rating pain < 0/10 for improved ROM for functional task performance. - GOAL NOT MET, 21    Pt will demo independence with ability to acheive WBing position on RUE with elbow and wrist extension to facilitate participation in [place] at home.  - - GOAL NOT MET, 21       Prognosis: []Good   [x]Fair   []Poor    Patient Requires Follow-up:  [x]Yes  []No    Plan: []Plan of care initiated     [x]Continue per plan of care    [] Alter current plan (see comments)    []Hold pending MD visit []Discharge      ---  ---- --- ---- --- ---- --- ---- --- ---- --- ---- --- ---- --- ---- --- ---- --- --- ---    Therapeutic Exercise/Home Exercise Program:    minutes    Therapeutic Activity:   minutes    ADL Training:    15  minutes      Neuromuscular Re-Education:  30 minutes      Manual Therapy:   minutes    Splintin minutes     Modalities:    minutes    Time in:       6556   Time Out:   1000     Timed Code Treatment Minutes:  45  minutes        Total Treatment Time:  45   minutes           Electronically signed by:  Júnior Forrester OT, OTR/BETINA

## 2021-07-27 NOTE — FLOWSHEET NOTE
[facility], [facility],  [address], [address]   Phone: [phone]  Fax [phone]      Speech-Language Pathology  Daily Treatment Note    Date:  2021    Patient Name:  Meagan Duran    :  1955  MRN: [id number]  Restrictions/Precautions:  Limited communication ability due to significant Aphasia  Treatment Diagnosis:    Codes     Aphasia as late effect of cerebrovascular accident (CVA)    -  Primary   Insurance/Certification information: Medicare A & B; [address]   Referring Physician:  Harvey Oquendo MD  Plan of care signed (Y/N):  Y  Visit# / total visits:     Pain level: no c/o pain    Progress Note: [x]  Yes  []  No  Next due by: Visit #10: 7/10 or 21    Subjective: The pt was his pleasant self. Objective:   Short-term Goals:  Goal 1: NEW GOAL: The pt will say indivdual phrase and short sentences, in response to therapy tasks, with 90% accuracy, mod cues:  -  56% (5/9), mod cues and extra time; targeted via use of PACE strategy / picture cards    Goal 2: NEW GOAL: The pt will complete basic confrontational and responsive naming with 90% accuracy, min cues  - Confrontational: 50% (1/2) mod cues; limited trials this date  - Responsive: 57% (4/7) mod cues    Goal 3: NEW GOAL:The pt will demonstrate reading comprehension of single phrases/short sentences with 90% accuracy, mod cues  - did not directly target; homework provided to target both reading & verbal expression    Goal 4: The pt will use an AAC speech generating device to answer given basic questions with 80% accuracy, mod-max cues:  - GOAL DISCONTINUED. The pt has not been using or bring his iPad and has not been wanting to use AAC communication due to improved verbal communication. Goal 5:  The pt will complete basic confrontational and responsive naming with 75% accuracy, mod-max cues:  -  GOAL MET    Goal 6: The pt will say indivdual phrase and short sentences, in response to therapy tasks, with 70% accuracy, mod-max cues:  - GOAL MET    Goal 7: The pt will write single basic words with 50% accuracy, mod-max cues:  - DISCONTINUE THIS GOAL DUE TO LACK OF PROGRESS      Other Treatments:      Assessment:   Consistent mod cues required for structured speech tasks this date. Plan:   Continued Speech Therapy services 2 x week for 6 weeks.      Total Treatment Time / Charges     Time in Time out Total Time / units   Cognitive Tx         Speech Tx 1002 1048 46 min / 1 unit   Dysphagia Tx           Signature:    SAMANTHA MackayS. [id number] [facility]  Speech-language pathologist  [id number]    Broderick Wiseman,  Clinician

## 2021-07-27 NOTE — PROGRESS NOTES
[facility], [facility], [address] [place] [facility], [address]  Phone: [phone]  Fax [phone]    Physical Therapy Progress Note    Date:  2021    Patient Name:  Lynda Serna    :  1955  MRN: [id number]  Restrictions/Precautions:    Medical/Treatment Diagnosis Information:  · Diagnosis: CVA, R hemiparesis  Insurance/Certification information:  PT Insurance Information: Medicare  Physician Information:  Referring Practitioner: Fernando Phillip MD  Plan of care signed (Y/N):  N  Visit# / total visits:    ________________________________________________________________________________________    Pain Level:    /10  SUBJECTIVE: Pt reports that he doesn't feel like it is \"his leg. \" Continues to report that he is having \"tight\" feelings in his leg and that his leg doesn't work like it did before his recent seizure. OBJECTIVE:   Test/Measurements:    PROM RLE (degrees)  R Hip Extension 0-10: 0-5*  R Hip ABduction 0-45: 0-35*  R Knee Flexion 0-145: 5-125*  PROM LLE (degrees)  LLE PROM: WFL  AROM LLE (degrees)  LLE AROM : WFL     Strength RLE  R Hip Flexion: 3+/5  R Hip Extension: 3+/5  R Hip ABduction: 3/5  R Hip External Rotation: 3/5  R Knee Flexion: 3+/5  R Knee Extension: 3/5  R Ankle Dorsiflexion: 3-/5  R Ankle Plantar flexion: 0/5  R Ankle Inversion: 2/5  R Ankle Eversion: 0/5  R Great Toe Extension: 0/5  R Great Toe Flexion: 0/5       ASSESSMENT:  Pt has completed 28 visits of physical therapy and has shown good improvements with strength, ROM, movement mechanics and functional mobility. Pt does continue with decreased purposeful muscle firing of hip flexor and knee extension, but this has significantly improved to where pt is now able to actively get through ROM. Pt's gait sequence has steadily improved with glut activation and push-off RLE. Pt did have a minor setback with the seizure that occurred a few weeks ago.  Pt's goal is to return to his home, and from a mobility standpoint, is an achievable goal. Pt will have to complete stairs at his home. Pt would benefit from additional therapy at 1x/wk for 4 weeks to address remaining impairments and focus on stair training for potential return to home.        Treatment/Activity Tolerance:   [x]Patient tolerated treatment well [] Patient limited by fatique  []Patient limited by pain [] Patient limited by other medical complications  [] Other:     Goals:    Short term goals  Time Frame for Short term goals: 6 weeks  Short term goal 1: pt will be indep in HEP - MET  Short term goal 2: pt will increase R DF strength by 1/3 muscle grade - MET  Short term goal 3: pt will ambulate with increased glut activation RLE - MET  Short term goal 4: pt will decrease TUG time 10 sec  Short term goal 5: pt will increase LEFS score by 10 points to hit MCID - unable to complete as pt could not comprehend          Plan: [] Continue per plan of care [x] Alter current plan (see comments)   [] Plan of care initiated [] Hold pending MD visit [] Discharge         Signature:  Suyapa Potts, PT , PT              Physician Signature:_______________________________Date:__________________  By signing above (or electronic signature), therapists plan is approved by physician

## 2021-07-29 ENCOUNTER — HOSPITAL ENCOUNTER (OUTPATIENT)
Dept: OCCUPATIONAL THERAPY | Age: 66
Setting detail: THERAPIES SERIES
Discharge: HOME | End: 2021-07-29
Payer: MEDICARE

## 2021-07-29 ENCOUNTER — HOSPITAL ENCOUNTER (OUTPATIENT)
Dept: PHYSICAL THERAPY | Age: 66
Setting detail: THERAPIES SERIES
Discharge: HOME | End: 2021-07-29
Payer: MEDICARE

## 2021-07-29 ENCOUNTER — APPOINTMENT (OUTPATIENT)
Dept: SPEECH THERAPY | Age: 66
End: 2021-07-29
Payer: MEDICARE

## 2021-07-29 PROCEDURE — 97110 THERAPEUTIC EXERCISES: CPT

## 2021-07-29 PROCEDURE — 97112 NEUROMUSCULAR REEDUCATION: CPT

## 2021-07-29 NOTE — PROGRESS NOTES
[place], [facility], [address], [address]  Phone: [phone]  Fax [phone]      Occupational Therapy Discharge  Dear Dr. Naomy Posada     The following patient has been discharged from therapy services. Please review the attached summary of the patient's plan of care, and verify that you agree with plan for discharge at this time    Plan of Care/Treatment to date:  [x] Therapeutic Exercise  [x]  Modalities:  [x] Therapeutic Activity   [] Ultrasound [x] Electrical Stimulation   [] Total Motion Release   [] Fluidotherapy [] Kinesiotaping  [x]  Neuromuscular Re-education  [] Iontophoresis [x] Coldpack/hotpack   [x]  Instruction in HEP    Other:  [x]  Manual Therapy     []   Dry needling             [x] ADL/Self Care  [x] IADL Training  [] LSVT BIG  [] Saebo  [] Splinting  [] Wheelchair Mobility                      Frequency/Duration:  # Days per week: [] 1 day # Weeks: [] 1 week [] 5 weeks      [x] 2 days   [] 2 weeks [] 6 weeks     [] 3 days   [] 3 weeks [] 7 weeks     [] 4 days   [x] 4 weeks [] 8 weeks     [] 5 days   [] 10 weeks [] 12 weeks        Rehab Potential: [] Excellent [] Good [x] Fair  [] Poor     Thank you for the referral of this patient. Please sign.      Physician signature_______________________ Date________________  By signing above, therapists plan is approved by physician     Fax to: [facility] [phone]     Electronically signed by:  Amalia Judge, OT,OTR/L        Outpatient Occupational Therapy     [x] Daily Treatment Note   [] Progress Note   [x] Discharge Note    Date:  7/29/2021    Patient Name:  Nicole Rodgers         YOB: 1955    Medical Diagnosis/ICD10[de-identified]   CVA/I63.9, Right hemiparesis, Right side neglect, Expressive aphasia  Treatment Diagnosis: Impaired self care status due to decreased functional use of RUE/RLE  Referring Physician: Dr. Naomy Posada    Referral Date:  3/15/2021  Onset Date:  Nov 2019  Visits Allowed/Insurance/Certification Information:   Medicare A/B, [employer]ARP  Restrictions/Precautions Right vic, aphasia    Plan of care sent to provider:    [x]Faxed (date:   ) []Co-signature    (attempts: 1[x] 2 []3[])        Plan of care signed:    [x]Yes (date:5/5/21; 6/2/21;  6/30/21  )           []No       Progress Note covers period from (if applicable):    [x]NA    []From 7/1/21         To     7/29/21      Visit# / total visits: 8/8, 8/8, 7/8; 7/12 (30 total visits)    Functional Outcome Measure:   3/25/2021: QuickDASH: 42  6/29/2021: QuickDASH: 42  7/29/2021: QuickDASH: 42    Subjective: Pt arrived alone. Pt ambulating with SBQC.       Pain level: denies        Assessment of UE tone/spasticity:    Date: 3/25/2021 5/4/21   6/1/21  6/8/21 6/29/21 7/27/21   Shoulder flexors 2 1  2  1+  2 2   Internal rotators 1  1+ 1+  1+  2 2   External rotators 0  0 0   1 1 1   Elbow flexors 1+  (clonus)  1  (at end range)  1+ 1+  1  (at end range)  1  (at end range)   Elbow extensor 1+ 1+   1  1 1  1  (at end range)   Supinators 0  0  0  0 0 0   Pronators 1+  1  1+  1+ 1+ 1+   Wrist flexors 2  2  3  2 2-3 2   Wrist extensors 0 0   0  0 0 0   Digit flexors 2-3  2 2  3  2-3 3   Digit extensors 0  0  0  0 0 0    Comments: Reports periods of decreased tone periodically throughout the day.      Modified Rian Scale  0    No increase in muscle tone  1    Slight increase in muscle tone, manifested by a catch and release or by minimal        resistance at the end of the range of motion when the affected part(s) is moved in        flexion or extension  1+ Slight increase in muscle tone, manifested by a catch, followed by minimal        resistance throughout the remainder (less than half) of the ROM  2    More marked increase in muscle tone through most of the ROM, but        affected part(s) easily moved  3    Considerable increase in muscle tone, passive movement difficult  4    Affected part(s) rigid in flexion or extension    Range of Motion     AROM                     RIGHT  RIGHT RIGHT RIGHT RIGHT    LEFT      Date 3/25/2021  5/4/21 6/1/21 6/29/21  7/27/21   3/25/2021     Shoulder:   flex x  x x x x    WNL                        ABD ~75o 70-75o  See comments See comments  see  comments   WNL     Elbow:      ext/flex x See comments    See comments   see  comments   WNL     Forearm:  sup/pron x  x   x  x   WNL     Wrist:       ext/flex x  x   x x    WNL     Digits: x  x   x  see  comments    WNL        Comments:   PROM of RUE WFL with increased time d/t flexor tone (see Modified Rian Scale below)      Demo initiation of active shrug on right with visual cues, moving though approx 25% of range  Demo full range for active scap squeeze     Attempts at RUE active movement result in right scap retraction, shld extension/abduction (to ~40o) with compensation at upper trap, elbow flexion, pronation and wrist/digit flex.      While EOM, pt able to move from 124o ext to 85o flex with facilitation to avoid compensating at shoulder. Trace contraction noted with attempts at elbow ext. Flexor tone increases with active movement, limiting active elbow ext.       No active movement noted in wrist.    Able to flex digits on command with max effort. No active digit ext. No sublux noted in right [facility] joint.         Strength  Muscle  (*denotes pain) Right   Right   Right     Left   DATE 3/25/2021 5/4/21  6/1/21 6/29/21 7/27/21      3/25/2021   Shoulder Flexion  1  ? 1 1  1     WNL   Shoulder Abduction  2+ 3-  2+ 2+ 2+      WNL   Elbow Flexion 1?  1 1 2  2     WNL   Elbow Extension 0  1 1 1 1      WNL   Pronation 0  0 0 0  0     WNL   Supination 0  0 0 0  0     WNL   Wrist Flexion 0  0 0 0  1     WNL   Wrist Extension 0  0 0 0  0     WNL          Objective:     Exercises:    Exercises in bold performed in department today.   Items not bolded are carried forward from prior visits for continuity of the record. Exercise/Equipment Resistance/Repetitions HEP Other comments      ADL/IADL TRAINING     Safety pictures Pt correctly ID safety concern in 19 of 19 pictures. Meal Prep Pt fried and egg. Step-by-step verbal cues provided. · Unable to crack egg  · Able to flip egg with non-dominant hand with increased time  · Able to move egg from frying pan to plate with non-dominant hand with increased time  · Reports he does not typically cook eggs at home    Pt prepared instant mac n cheese this date. Pt familiar with steps to complete task. Demo difficulty with:   · Opening pkgs with one hand   · Seeing fill line indicator inside cup  · Selecting numbers on microwave     Adaptations:  · Simplified written instructions  · dycem under cup  · Scissor to open cheese pk      Plan for cont training with one handed techniques for kitchen tasks    Educated on adapted cutting board (pt may have one already) and one handed can/jar openers. Functional Mobility Pt ambulating with SBQC, w/o AFO - with mod ind  OT<>bathroom  OT>parking lot []      Brake reaction time RLE: 20.0 seconds  LLE: average of 0.804 seconds    OT expressed concerns about processing and response time and explained that in-clinic assessment would be limited by aphasia [] If pt wishes to pursue a driving, he will require hand controls and may need to learn to use LLE to operate brake and accelerator.     Recommended in-car driving training at [address] and THERAPEUTIC ACTIVITY        Education on stroke recovery Role of OT  Progress to date and limitations to progress  Tone and management of tone  Typical progression of UE function after stoke     Reviewed recommendation for meeting with PM&R MD to discuss tone management options   []            WBing RUE, fully ext  EOM  10 min     Weight shifting toward right for increased proprioceptive input [] Denies pain with WBing       Right UE movement Scap squeeze, 5x      Completed in front of mirror for visual feed back  Contraction noted with shrug, slight movement noted  Contraction and movement noted with squeeze      EOM - use of table top at shld ht  Active:Horizontal adduction  Active assisted: Horizontal abduction    Supine - Active assisted:   shld flex , 2x   shld ext, 2x  Elbow flex, 2x  Elbow ext, 2x   Reprinted and reviewed scap squeeze for HEP - recommended in front of mirror    Noted pt initiating movement, even against gravity. Increased effort required - cues for breathing      Pt able to initiate movements but requiring facilitation to move through full ROM and remain in normal movement pattern            THERAPEUTIC EXERCISE and MANUAL TECHNIQUES        PROM RUE  - Supine    Shld flexion to ~110-115o  Shld abduction to ~90-95o  ER to ~25o  Elbow ext  Wrist ext with increased effort  Digit ext with increased effort   [] Pt's sister in law trained on PROM techniques for shld flex, shld abd and elbow ext. She returned demo and verbalized understanding. Emailed program via [employer]. Reprinted PROM for supine shoulder abduction and suggested pt ask staff at [address] [place] [address] to assist as appropriate. Self ROM shld flexion while supine  Instructed to avoid flex >90  Pt returned demo    Digit ext - on table top or with palm flat on seating surface beside him. Provided dycem to assist with holding digits in ext.   [] Reprinted and reviewed for HEP     Scap mobs  - right EOM - All directions    Supine - scap adduction    Side lying - scap depression, upward and downward rotation []       MODALITIES     NMES Right elbow ext  Intensity: 18  Time: 12 min    Right wrist/digit ext  Intensity: 15  Time: 15 min    Attempted grasp/release exercise during NMES to wrist/digit ext however attempts at active movement increasing flexor tone [] Empi unit    Symmetrical/synchronous  10s on/ 5s off  35 PPS  300 wavelength  2.0 ramp time       []       SPLINTING       RHS Pt reports pre-bettina resting hand splint at home as recommended by home OT. Pt reports difficult to don independently []      Home Exercise Program: PROM by caregiver, self ROM, Shoulder/scap exercises with visual feedback    Treatment/Activity Tolerance:    Patients response to treatment:   [x]Patient tolerated treatment well []Patient limited by fatigue   []Patient limited by pain  []Patient limited by other medical complications   []Other:     Assessment:   Pt demo limited progress toward OT goals. Pt met 2/4 STG and 0.5/4 LTG. Barriers to progress include spasticity in RUE. Tone reduction techniques of PROM, NMES and WBing have only temporary effects. Pt demo increased flexor tone in RUE with attempts at active movement. OT recommending pt consult Dr. Tanner Stanford for tone management options. Pt has an appointment with Dr. Tanner Stanford scheduled for August 23, 2021. Pt discharged from outpatient OT caseload this date due to limited progress. Recommened pt return to outpatient OT if he chooses to proceed with tone management options presented by Dr Tanner Stanford. Pt and caregivers in agreement with plan for d/c from OT caseload this date. Pt to continue with OT HEP of self ROM of RUE and scapular squeezes.         GOALS:  Patient goal: \" Get right arm working. Move back into home. \"     Short Term Goals:  to be met in 2 weeks (by 7/13/2021)     Pt will demonstrate active assisted right shoulder flexion to 50 degrees or better, gravity reduced, while rating pain < 2/10 for improved ROM for functional task performance. - GOAL NOT MET, 7/27/21     Pt will demonstrate 15o degrees of active assisted elbow flexion against gravity, while rating pain < 0/10 for improved ROM for functional task performance. - GOAL MET, 6/29/21    Pt will complete moderate level meal prep with supervision and modified instructions as needed. - GOAL NOT MET, 7/27/21    Pt will ID safety concerns in 4/5 safety awareness photos.  - GOAL MET, 7/6/21       Long Term

## 2021-07-29 NOTE — FLOWSHEET NOTE
[facility], [facility], [address] [facility], [address]   Phone: [phone]  Fax [phone]      Outpatient Occupational Therapy     [x] Daily Treatment Note   [] Progress Note   [] Discharge Note    Date:  7/29/2021    Patient Name:  Enrique Zarco         YOB: 1955    Medical Diagnosis/ICD10[de-identified]   CVA/I63.9, Right hemiparesis, Right side neglect, Expressive aphasia  Treatment Diagnosis: Impaired self care status due to decreased functional use of RUE/RLE  Referring Physician: Dr. Trent Lindo    Referral Date:  3/15/2021  Onset Date:  Nov 2019  Visits Allowed/Insurance/Certification Information:   Medicare A/B, AARP  Restrictions/Precautions Right vic, aphasia    Plan of care sent to provider:    [x]Faxed (date:   ) []Co-signature    (attempts: 1[x] 2 []3[])        Plan of care signed:    [x]Yes (date:5/5/21; 6/2/21;  6/30/21  )           []No       Progress Note covers period from (if applicable):    [x]NA    []From 7/1/21         To     7/29/21      Visit# / total visits: 8/8, 8/8, 7/8; 7/12 (30 total visits)    Functional Outcome Measure:   3/25/2021: QuickDASH: 42  6/29/2021: QuickDASH: 42  7/29/2021: QuickDASH: 42    Subjective: Pt arrived alone. Pt ambulating with SBQC.       Pain level: denies        Assessment of UE tone/spasticity:    Date: 3/25/2021 5/4/21   6/1/21  6/8/21 6/29/21 7/27/21   Shoulder flexors 2 1  2  1+  2 2   Internal rotators 1  1+ 1+  1+  2 2   External rotators 0  0 0   1 1 1   Elbow flexors 1+  (clonus)  1  (at end range)  1+ 1+  1  (at end range)  1  (at end range)   Elbow extensor 1+ 1+   1  1 1  1  (at end range)   Supinators 0  0  0  0 0 0   Pronators 1+  1  1+  1+ 1+ 1+   Wrist flexors 2  2  3  2 2-3 2   Wrist extensors 0 0   0  0 0 0   Digit flexors 2-3  2 2  3  2-3 3   Digit extensors 0  0  0  0 0 0    Comments: Reports periods of decreased tone periodically throughout the day.      Modified Rian Scale  0    No increase in muscle tone  1    Slight increase in muscle tone, manifested by a catch and release or by minimal        resistance at the end of the range of motion when the affected part(s) is moved in        flexion or extension  1+ Slight increase in muscle tone, manifested by a catch, followed by minimal        resistance throughout the remainder (less than half) of the ROM  2    More marked increase in muscle tone through most of the ROM, but        affected part(s) easily moved  3    Considerable increase in muscle tone, passive movement difficult  4    Affected part(s) rigid in flexion or extension    Range of Motion     AROM                     RIGHT  RIGHT RIGHT RIGHT RIGHT    LEFT      Date 3/25/2021  5/4/21 6/1/21 6/29/21  7/27/21   3/25/2021     Shoulder:   flex x  x x x x    WNL                        ABD ~75o 70-75o  See comments See comments  see  comments   WNL     Elbow:      ext/flex x See comments    See comments   see  comments   WNL     Forearm:  sup/pron x  x   x  x   WNL     Wrist:       ext/flex x  x   x x    WNL     Digits: x  x   x  see  comments    WNL        Comments:   PROM of RUE WFL with increased time d/t flexor tone (see Modified Rian Scale below)      Demo initiation of active shrug on right with visual cues, moving though approx 25% of range  Demo full range for active scap squeeze     Attempts at RUE active movement result in right scap retraction, shld extension/abduction (to ~40o) with compensation at upper trap, elbow flexion, pronation and wrist/digit flex.      While EOM, pt able to move from 124o ext to 85o flex with facilitation to avoid compensating at shoulder. Trace contraction noted with attempts at elbow ext. Flexor tone increases with active movement, limiting active elbow ext.       No active movement noted in wrist.    Able to flex digits on command with max effort. No active digit ext. No sublux noted in right GH joint.       Strength  Muscle  (*denotes pain) Right   Right   Right     Left   DATE 3/25/2021 5/4/21  6/1/21 6/29/21 7/27/21      3/25/2021   Shoulder Flexion  1  ? 1 1  1     WNL   Shoulder Abduction  2+ 3-  2+ 2+ 2+      WNL   Elbow Flexion 1?  1 1 2  2     WNL   Elbow Extension 0  1 1 1 1      WNL   Pronation 0  0 0 0  0     WNL   Supination 0  0 0 0  0     WNL   Wrist Flexion 0  0 0 0  1     WNL   Wrist Extension 0  0 0 0  0     WNL          Objective:     Exercises:    Exercises in bold performed in department today. Items not bolded are carried forward from prior visits for continuity of the record. Exercise/Equipment Resistance/Repetitions HEP Other comments      ADL/IADL TRAINING     Safety pictures Pt correctly ID safety concern in 19 of 19 pictures. Meal Prep Pt fried and egg. Step-by-step verbal cues provided. · Unable to crack egg  · Able to flip egg with non-dominant hand with increased time  · Able to move egg from frying pan to plate with non-dominant hand with increased time  · Reports he does not typically cook eggs at home    Pt prepared instant mac n cheese this date. Pt familiar with steps to complete task. Demo difficulty with:   · Opening pkgs with one hand   · Seeing fill line indicator inside cup  · Selecting numbers on microwave     Adaptations:  · Simplified written instructions  · dycem under cup  · Scissor to open cheese pk      Plan for cont training with one handed techniques for kitchen tasks    Educated on adapted cutting board (pt may have one already) and one handed can/jar openers.       LB dressing Doff/don right shoe and AFO with modified independence as pt required increased time    [] Increased effort this date d/t new shoes    Per PT, pt no longer requires AFO 4/15/21     Functional Mobility Pt ambulating with SBQC, w/o AFO - with mod ind  OT<>bathroom  OT>parking lot []    ADLs Pt performed toileting tasks with modified indepedence     Modified indep with dof/don overhead shirts x2  Standing to dof, seated to don []      Brake reaction time RLE: 20.0 seconds  LLE: average of 0.804 seconds    OT expressed concerns about processing and response time and explained that in-clinic assessment would be limited by aphasia [] If pt wishes to pursue a driving, he will require hand controls and may need to learn to use LLE to operate brake and accelerator. Recommended in-car driving training at [facility] RE-EDU and THERAPEUTIC ACTIVITY      Visual status Pt's visual fields intact  Tracking intact    Pt reports right eye \"isn't good\"  Wears reading glasses occasionally      Education on stroke recovery Role of OT  Progress to date and limitations to progress  Tone and management of tone  Typical progression of UE function after stoke     Reviewed recommendation for meeting with PM&R MD to discuss tone management options   []            WBing RUE, fully ext  EOM  10 min     Inflatable arm splint in place to maintain elbow ext    Weight shifting toward right for increased proprioceptive input [] Denies pain with WBing       Right UE movement Scap squeeze, 5x      Completed in front of mirror for visual feed back  Contraction noted with shrug, slight movement noted  Contraction and movement noted with squeeze      Use of inflatable arm splint to isolate shoulder muscles for facilitating normal movement patterns and reduce compensation however - Difficulty isolating desired muscles - significant compensation noted      EOM - use of table top at shld ht  Active:Horizontal adduction  Active assisted: Horizontal abduction    Supine - Active assisted:   shld flex , 2x   shld ext, 2x  Elbow flex, 2x  Elbow ext, 2x   Reprinted and reviewed scap squeeze for HEP - recommended in front of mirror    Noted pt initiating movement, even against gravity.                  Increased effort required - cues for breathing      Pt able to initiate movements but requiring facilitation to move through full ROM and remain in normal movement pattern       Mirror Therapy Educated pt on purpose of and research re: mirror therapy    Pt participated in mirror therapy for 10 min  Completing exercises with left hand while watching reflection  2-3 cues to cont to watch reflexion  Pt  Will benefit from cont education d/t comprehension may be limited by aphasia. Arm skate Seated EOM, using rolling/hieght adjustable table    Pushing cones of edge of table (16x)    Demo active movement for: (flexor synergy)  Horizontal adduction, elbow flex and shld ext    Active assist for:  Horizontal abduction, elbow ext, shld flex [] Increased effort required - cues for breathing        THERAPEUTIC EXERCISE and MANUAL TECHNIQUES        PROM RUE  - Supine    Shld flexion to ~110-115o  Shld abduction to ~90-95o  ER to ~25o  Elbow ext  Wrist ext with increased effort  Digit ext with increased effort   [] Pt's sister in law trained on PROM techniques for shld flex, shld abd and elbow ext. She returned demo and verbalized understanding. Emailed program via [employer]. Reprinted PROM for supine shoulder abduction and suggested pt ask staff at [address] [place] [address] to assist as appropriate. Self ROM shld flexion while supine  Instructed to avoid flex >90  Pt returned demo    Digit ext - on table top or with palm flat on seating surface beside him. Provided dycem to assist with holding digits in ext.   [] Reprinted and reviewed for HEP     Scap mobs  - right EOM - All directions    Supine - scap adduction    Side lying - scap depression, upward and downward rotation []       MODALITIES     NMES Right elbow ext  Intensity: 18  Time: 12 min    Right wrist/digit ext  Intensity: 15  Time: 15 min    Attempted grasp/release exercise during NMES to wrist/digit ext however attempts at active movement increasing flexor tone [] Empi unit    Symmetrical/synchronous  10s on/ 5s off  35 PPS  300 wavelength  2.0 ramp time       [] SPLINTING       RHS Pt reports pre-bettina resting hand splint at home as recommended by home OT. Pt reports difficult to don independently []      Home Exercise Program: PROM by caregiver, self ROM, Shoulder/scap exercises with visual feedback    Treatment/Activity Tolerance:    Patients response to treatment:   [x]Patient tolerated treatment well []Patient limited by fatigue   []Patient limited by pain  []Patient limited by other medical complications   []Other:     Assessment:   Pt demo limited progress toward OT goals. Pt met 2/4 STG and 0.5/4 LTG. Barriers to progress include spasticity in RUE. Tone reduction techniques of PROM, NMES and WBing have only temporary effects. Pt demo increased flexor tone in RUE with attempts at active movement. OT recommending pt consult Dr. Philly Cullen for tone management options. Pt has an appointment with Dr. Philly Cullen scheduled for August 23, 2021. Pt discharged from outpatient OT caseload this date due to limited progress. Recommened pt return to outpatient OT if he chooses to proceed with tone management options presented by Dr Philly Cullen. Pt and caregivers in agreement with plan for d/c from OT caseload this date. Pt to continue with OT HEP of self ROM of RUE and scapular squeezes.         GOALS:  Patient goal: \" Get right arm working. Move back into home. \"     Short Term Goals:  to be met in 2 weeks (by 7/13/2021)     Pt will demonstrate active assisted right shoulder flexion to 50 degrees or better, gravity reduced, while rating pain < 2/10 for improved ROM for functional task performance. - GOAL NOT MET, 7/27/21     Pt will demonstrate 15o degrees of active assisted elbow flexion against gravity, while rating pain < 0/10 for improved ROM for functional task performance. - GOAL MET, 6/29/21    Pt will complete moderate level meal prep with supervision and modified instructions as needed. - GOAL NOT MET, 7/27/21    Pt will ID safety concerns in 4/5 safety awareness photos.  - GOAL MET, 21       Long Term Goals:  to be met in 4 weeks (by 2021)     Pt will demonstrate active assisted right shoulder flexion to 50 degrees, against gravity, while rating pain < 2/10 for improved ROM for functional task performance. - GOAL NOT MET, 21     Pt will demonstrate 25o degrees of right active elbow flexion,against gravity, while rating pain < 0/10 for improved ROM for functional task performance. - GOAL Partially MET, 21     Pt will demonstrate 20o degrees of right active elbow extension while rating pain < 0/10 for improved ROM for functional task performance. - GOAL NOT MET, 21    Pt will demo independence with ability to acheive WBing position on RUE with elbow and wrist extension to facilitate participation in [place] at home.  - - GOAL NOT MET, 21       Prognosis: []Good   [x]Fair   []Poor    Patient Requires Follow-up:  []Yes  [x]No    Plan: []Plan of care initiated     []Continue per plan of care    [] Alter current plan (see comments)    []Hold pending MD visit [x]Discharge      ---  ---- --- ---- --- ---- --- ---- --- ---- --- ---- --- ---- --- ---- --- ---- --- --- ---    Therapeutic Exercise/Home Exercise Program:   30 minutes    Therapeutic Activity:   minutes    ADL Training:     minutes      Neuromuscular Re-Education:  15 minutes      Manual Therapy:   minutes    Splintin minutes     Modalities:    minutes    Time in:       1000   Time Out:        Timed Code Treatment Minutes:  45  minutes        Total Treatment Time:  45   minutes           Electronically signed by:  Tang Encinas OT, OTR/L

## 2021-07-29 NOTE — FLOWSHEET NOTE
[facility], [facility] [address], [address]   Phone: [phone]  Fax [phone]    Physical Therapy Daily Treatment Note    Date:  2021    Patient Name:  Aliya Bergman    :  1955  MRN: [id number]  Restrictions/Precautions:    Medical/Treatment Diagnosis Information:  · Diagnosis: CVA, R hemiparesis  Insurance/Certification information:  PT Insurance Information: Medicare  Physician Information:  Referring Practitioner: Ganesh Starks MD  Plan of care signed (Y/N):  N  Visit# / total visits:      G-Code (if applicable):          LEFS     Medicare Cap (if applicable):  7660= total amount used, updated 2021    Time in:  9:10  Timed Treatment: 50  Total Treatment Time:  50  ________________________________________________________________________________________    Pain Level:    /10  SUBJECTIVE: Pt continues to report about his RLE and foot. Unable to portray exactly what he is feeling but notes that he is unable to move it with a purpose. OBJECTIVE: [id number]    Exercise/Equipment Resistance/Repetitions Other comments   NuStep 5 min           Supine leg press  Supine hip flexion 3 min  3 min Manual resistance   Clams #1 x10 RLE    Bridge    Single limb x15  x15    PHE    Bent knee x10  x10 RLE In side-lying this session   PKF  In sidelying this session   Supine clams  yellow   Supine hip neuro re-ed 3 min RLE    LAQ    With TB assist   Seated hamstring curls x10 With maxislide        Side-lying knee extension/flexion     Hooklying ADD ball squeeze    With hip flexion   x10    Backwards walking alternating with resistance walking in parallel bars6 lapsRed band for forwards walking, 3 # weight removed CHCF through to increase stride length. Focusing on pushoff and stride length.    Standing Hip Flexion with facilitation x20 manual facilitation   Slant board 3x30\"   With hamstring stretch   TG Standing step over object    Minisquat [id number]   FSU x15 BLE6\"   Side-stepping 4 laps // barsUE support   Standing hip abduction. x10 B     Other Therapeutic Activities:      Manual Treatments:        Modalities:      Test/Measurements:         ASSESSMENT:    Pt tolerated session fairly. Functional activities continue focusing on increasing push off and left leg step length to focus on patient goal of improved gait speed and control. Patient required UE assist on // bars for FSU. Able to perform mod I with handrail for 6\" step. Treatment/Activity Tolerance:   [x]Patient tolerated treatment well [] Patient limited by fatique  []Patient limited by pain [] Patient limited by other medical complications  [] Other:     Goals:    Short term goals  Time Frame for Short term goals: 6 weeks  Short term goal 1: pt will be indep in HEP  Short term goal 2: pt will increase R DF strength by 1/3 muscle grade  Short term goal 3: pt will ambulate with increased glut activation RLE  Short term goal 4: pt will decrease TUG time 10 sec  Short term goal 5: pt will increase LEFS score by 10 points to hit MCID           Plan: [x] Continue per plan of care [] Alter current plan (see comments)   [] Plan of care initiated [] Hold pending MD visit [] Discharge      Plan for Next Session:  Biomechanical correction of problems as they present. Facilitate correct muscle firing patterns and activation with appropriate activities. Progress patient as tolerated.      Re-Certification Due Date:         Signature:  Vernon Ortega, PT , PT

## 2021-08-03 ENCOUNTER — HOSPITAL ENCOUNTER (OUTPATIENT)
Dept: SPEECH THERAPY | Age: 66
Setting detail: THERAPIES SERIES
Discharge: HOME | End: 2021-08-03
Payer: MEDICARE

## 2021-08-03 PROCEDURE — 92507 TX SP LANG VOICE COMM INDIV: CPT

## 2021-08-03 NOTE — FLOWSHEET NOTE
[facility], [facility], [address]  [place], [address]   Phone: [phone]  Fax [phone]      Speech-Language Pathology  Daily Treatment Note    Date:  8/3/2021    Patient Name:  Nicole Rodgers    :  1955  MRN: [id number]  Restrictions/Precautions:  Limited communication ability due to significant Aphasia  Treatment Diagnosis:    Codes     Aphasia as late effect of cerebrovascular accident (CVA)    -  Primary   Insurance/Certification information: Medicare A & B; [address]   Referring Physician:  Lesia Manzano. Aleks Saunders MD  Plan of care signed (Y/N):  Y  Visit# / total visits:     Pain level: no c/o pain    Progress Note: []  Yes  [x]  No  Next due by: Visit #10: 1/10 or 21    Subjective: The pt was in good spirits. Apparent improve spoken verbal expression this date compared to his last session. Objective:   Short-term Goals:  Goal 1: NEW GOAL: The pt will say indivdual phrase and short sentences, in response to therapy tasks, with 90% accuracy, mod cues:  -  70% (7/0), mod cues and extra time; targeted having the pt say a phrase about a given word (e.g, \"Car\", \"You drive it\")    Goal 2: NEW GOAL: The pt will complete basic confrontational and responsive naming with 90% accuracy, min cues  - Confrontational: 70% (7/10) min cues and extra time  - Responsive: 70% (4/7) min cues and extra time    Goal 3: NEW GOAL:The pt will demonstrate reading comprehension of single phrases/short sentences with 90% accuracy, mod cues  - 40%, (2/5), mod cues; targeted by having him read a phrase and answer a yes/no question about the phrase    Goal 4: The pt will use an AAC speech generating device to answer given basic questions with 80% accuracy, mod-max cues:  - GOAL DISCONTINUED. The pt has not been using or bring his iPad and has not been wanting to use AAC communication due to improved verbal communication. Goal 5:  The pt will complete basic confrontational and responsive naming with 75% accuracy, mod-max cues:  -  GOAL MET    Goal 6: The pt will say indivdual phrase and short sentences, in response to therapy tasks, with 70% accuracy, mod-max cues:  - GOAL MET    Goal 7: The pt will write single basic words with 50% accuracy, mod-max cues:  - DISCONTINUE THIS GOAL DUE TO LACK OF PROGRESS      Other Treatments:      Assessment:   Increased difficulty with the phrase reading task today; the method in which this goal is targeted tends to greatly influence his accuracy reflecting poor reading comprehension. Plan:   Continued Speech Therapy services 2 x week for 6 weeks. Total Treatment Time / Charges     Time in Time out Total Time / units   Cognitive Tx         Speech Tx 1430 1520 50 min / 1 unit   Dysphagia Tx           Signature:     Luis Mathews, CCC-SLP  LW[address][id number]  Speech-Language Pathologist  Phone: [phone]  Speech Desk: [phone]     Holley Savage,  Clinician

## 2021-08-05 ENCOUNTER — HOSPITAL ENCOUNTER (OUTPATIENT)
Dept: SPEECH THERAPY | Age: 66
Setting detail: THERAPIES SERIES
Discharge: HOME | End: 2021-08-05
Payer: MEDICARE

## 2021-08-05 PROCEDURE — 92507 TX SP LANG VOICE COMM INDIV: CPT

## 2021-08-05 NOTE — FLOWSHEET NOTE
[facility], [facility],  [address], [address]   Phone: [phone]  Fax [phone]      Speech-Language Pathology  Daily Treatment Note    Date:  2021    Patient Name:  Leopoldo Campanile    :  1955  MRN: [id number]  Restrictions/Precautions:  Limited communication ability due to significant Aphasia  Treatment Diagnosis:    Codes     Aphasia as late effect of cerebrovascular accident (CVA)    -  Primary   Insurance/Certification information: Medicare A & B; [address]   Referring Physician:  Jaison Murrieta. Melody Willis MD  Plan of care signed (Y/N):  Y  Visit# / total visits:  10/12   Pain level: no c/o pain    Progress Note: []  Yes  [x]  No  Next due by: Visit #10: 2/10 or 21    Subjective: The pt was his typically pleasant self. Objective:   Short-term Goals:  Goal 1: NEW GOAL: The pt will say indivdual phrase and short sentences, in response to therapy tasks, with 90% accuracy, mod cues:  -  80% (8/0), mod cues and extra time; targeted having the pt say a phrase about a given word (e.g, \"Car\", \"You drive it\")    Goal 2: NEW GOAL: The pt will complete basic confrontational and responsive naming with 90% accuracy, min cues  - Confrontational: 50% (5/10) min cues and extra time  - Responsive: 80% (8/10) min cues and extra time    Goal 3: NEW GOAL:The pt will demonstrate reading comprehension of single phrases/short sentences with 90% accuracy, mod cues  - 67%, (4/6), mod cues and extra time; targeted by having him read a phrase and answer a yes/no question about the phrase    Goal 4: The pt will use an AAC speech generating device to answer given basic questions with 80% accuracy, mod-max cues:  - GOAL DISCONTINUED. The pt has not been using or bring his iPad and has not been wanting to use AAC communication due to improved verbal communication. Goal 5:  The pt will complete basic confrontational and responsive naming with 75% accuracy, mod-max cues:  -  GOAL MET    Goal 6: The pt will say indivdual phrase and short sentences, in response to therapy tasks, with 70% accuracy, mod-max cues:  - GOAL MET    Goal 7: The pt will write single basic words with 50% accuracy, mod-max cues:  - DISCONTINUE THIS GOAL DUE TO LACK OF PROGRESS      Other Treatments:  Labeling words to complete carrier phrases: 100% (8/8)    Assessment:   Noted improved verbal output this date overall. Plan:   Continued Speech Therapy services 2 x week for 6 weeks. Total Treatment Time / Charges     Time in Time out Total Time / units   Cognitive Tx         Speech Tx 1015 1100 45 min / 1 unit   Dysphagia Tx           Signature:     Luis Garcia, [id number] [id number]  Speech-Language Pathologist  Phone: [phone]  Speech Desk: [phone]

## 2021-08-09 ENCOUNTER — APPOINTMENT (OUTPATIENT)
Dept: SPEECH THERAPY | Age: 66
End: 2021-08-09
Payer: MEDICARE

## 2021-08-10 ENCOUNTER — HOSPITAL ENCOUNTER (OUTPATIENT)
Dept: SPEECH THERAPY | Age: 66
Setting detail: THERAPIES SERIES
Discharge: HOME | End: 2021-08-10
Payer: MEDICARE

## 2021-08-10 PROCEDURE — 92507 TX SP LANG VOICE COMM INDIV: CPT

## 2021-08-10 NOTE — FLOWSHEET NOTE
accuracy, mod-max cues:  - GOAL MET    Goal 7: The pt will write single basic words with 50% accuracy, mod-max cues:  - DISCONTINUE THIS GOAL DUE TO LACK OF PROGRESS      Other Treatments:  Basic yes/no questions: 62% (5/8)          Assessment:   Pt with increased spontaneous production of single words and short phrases throughout tx session. Plan:   Continued Speech Therapy services 2 x week for 6 weeks.      Total Treatment Time / Charges     Time in Time out Total Time / units   Cognitive Tx         Speech Tx 1015 1100 45 min / 1 unit   Dysphagia Tx           Signature:    Kamila Fontanez,  Clinician    SAMANTHA BautistaS. [id number] [facility], [place]  Speech-language pathologist  [id number]

## 2021-08-13 ENCOUNTER — HOSPITAL ENCOUNTER (OUTPATIENT)
Dept: PHYSICAL THERAPY | Age: 66
Setting detail: THERAPIES SERIES
Discharge: HOME | End: 2021-08-13
Payer: MEDICARE

## 2021-08-13 PROCEDURE — 97110 THERAPEUTIC EXERCISES: CPT

## 2021-08-13 PROCEDURE — 97112 NEUROMUSCULAR REEDUCATION: CPT

## 2021-08-13 NOTE — FLOWSHEET NOTE
[facility], [facility], [address] [place] [address], [address] [place] [address]  Phone: [phone]  Fax [phone]    Physical Therapy Daily Treatment Note    Date:  2021    Patient Name:  Lamont Haque    :  1955  MRN: [id number]  Restrictions/Precautions:    Medical/Treatment Diagnosis Information:  · Diagnosis: CVA, R hemiparesis  Insurance/Certification information:  PT Insurance Information: Medicare  Physician Information:  Referring Practitioner: Erendira García MD  Plan of care signed (Y/N):  N  Visit# / total visits:      G-Code (if applicable):          LEFS     Medicare Cap (if applicable):  6983= total amount used, updated 2021    Time in:  12:30  Timed Treatment: 50  Total Treatment Time:  50  ________________________________________________________________________________________    Pain Level:    /10  SUBJECTIVE: Pt continues to report about his RLE and foot. Notes that they are not working how he wants them to. Notes that if he could just get a little more use out of his foot than he would be better off. OBJECTIVE: [id number]    Exercise/Equipment Resistance/Repetitions Other comments   NuStep 5 min           Supine leg press  Supine hip flexion 3 min  3 min Manual resistance   Clams #1 x10 RLE    Bridge    Single limb x15  x15    PHE    Bent knee x10  x10 RLE In side-lying this session   PKF  In sidelying this session   Supine clams  yellow   Supine hip neuro re-ed 3 min RLE    LAQ    With TB assist   Seated hamstring curls x10 With maxislide        Side-lying knee extension/flexion     Hooklying ADD ball squeeze    With hip flexion   x10    Backwards walking alternating with resistance walking in parallel bars6 lapsRed band for forwards walking, 3 # weight removed senior care through to increase stride length. Focusing on pushoff and stride length.    Standing Hip Flexion with facilitation x20 manual facilitation Slant board 3x30\"   With hamstring stretch   TG    Standing step over object    Minisquat [id number]   FSU x15 BLE6\"   Side-stepping 4 laps // barsUE support   Standing hip abduction. x10 B     Other Therapeutic Activities:      Manual Treatments:        Modalities:      Test/Measurements:         ASSESSMENT:    Pt tolerated session fairly. Functional activities continue focusing on increasing push off and left leg step length to focus on patient goal of improved gait speed and control. Patient required UE assist on // bars for FSU. Able to perform mod I with handrail for 6\" step. Treatment/Activity Tolerance:   [x]Patient tolerated treatment well [] Patient limited by fatique  []Patient limited by pain [] Patient limited by other medical complications  [] Other:     Goals:    Short term goals  Time Frame for Short term goals: 6 weeks  Short term goal 1: pt will be indep in HEP  Short term goal 2: pt will increase R DF strength by 1/3 muscle grade  Short term goal 3: pt will ambulate with increased glut activation RLE  Short term goal 4: pt will decrease TUG time 10 sec  Short term goal 5: pt will increase LEFS score by 10 points to hit MCID           Plan: [x] Continue per plan of care [] Alter current plan (see comments)   [] Plan of care initiated [] Hold pending MD visit [] Discharge      Plan for Next Session:  Biomechanical correction of problems as they present. Facilitate correct muscle firing patterns and activation with appropriate activities. Progress patient as tolerated.      Re-Certification Due Date:         Signature:  Chapin Can, PT , PT

## 2021-08-17 ENCOUNTER — APPOINTMENT (OUTPATIENT)
Dept: PHYSICAL THERAPY | Age: 66
End: 2021-08-17
Payer: MEDICARE

## 2021-08-17 ENCOUNTER — APPOINTMENT (OUTPATIENT)
Dept: SPEECH THERAPY | Age: 66
End: 2021-08-17
Payer: MEDICARE

## 2021-08-19 ENCOUNTER — APPOINTMENT (OUTPATIENT)
Dept: SPEECH THERAPY | Age: 66
End: 2021-08-19
Payer: MEDICARE

## 2021-08-24 ENCOUNTER — APPOINTMENT (OUTPATIENT)
Dept: SPEECH THERAPY | Age: 66
End: 2021-08-24
Payer: MEDICARE

## 2021-08-24 ENCOUNTER — APPOINTMENT (OUTPATIENT)
Dept: PHYSICAL THERAPY | Age: 66
End: 2021-08-24
Payer: MEDICARE

## 2021-08-26 ENCOUNTER — APPOINTMENT (OUTPATIENT)
Dept: SPEECH THERAPY | Age: 66
End: 2021-08-26
Payer: MEDICARE

## 2021-08-31 ENCOUNTER — HOSPITAL ENCOUNTER (OUTPATIENT)
Dept: SPEECH THERAPY | Age: 66
Setting detail: THERAPIES SERIES
Discharge: HOME | End: 2021-08-31
Payer: MEDICARE

## 2021-08-31 ENCOUNTER — HOSPITAL ENCOUNTER (OUTPATIENT)
Dept: PHYSICAL THERAPY | Age: 66
Setting detail: THERAPIES SERIES
Discharge: HOME | End: 2021-08-31
Payer: MEDICARE

## 2021-08-31 PROCEDURE — 97110 THERAPEUTIC EXERCISES: CPT

## 2021-08-31 PROCEDURE — 97112 NEUROMUSCULAR REEDUCATION: CPT

## 2021-08-31 NOTE — FLOWSHEET NOTE
[facility], [facility], [address] [place] [address]  Phone: [phone]  Fax [phone]      Speech Therapy  Cancellation/No-show Note  Patient Name:  Aliya Bergman  :  1955   Date:  2021  Cancels to Date: 0  No-shows to Date: 1    Patient status for today's appointment patient:  []  Cancelled  []  Rescheduled appointment  [x]  No-show     Reason given by patient:  []  Patient ill  []  Conflicting appointment  []  No transportation    []  Conflict with work  [x]  No reason given  []  Other:     Comments:      Phone call information:   [x]  Phone call made today to patient at 10:25 time at number provided:      [x]  Patient answered, conversation as follows: The pt's facility, the [address], was contacted by the therapist. The  said that the pt was set up to be transferred to the appointment but another staff member said that the appointment was cancelled and had the pt go back to his room. The  was informed that the appointment was not called on outpatient rehabilitation's end and that he had a Speech Therapy appointment at 10:15 AM and a Physical Therapy appointment at 11:15. She said that she would reach out to this staff member and call our office back as soon as possible with details.     []  Patient did not answer, message left as follows:  []  Phone call not made today    Electronically signed by:  Karen Ordonez, CORWIN

## 2021-08-31 NOTE — FLOWSHEET NOTE
[facility]. and Therapy, [facility], [address] [place] [address]  Phone: [phone]  Fax [phone]    Physical Therapy Daily Treatment Note    Date:  2021    Patient Name:  Car Sorto    :  1955  MRN: [id number]  Restrictions/Precautions:    Medical/Treatment Diagnosis Information:  · Diagnosis: CVA, R hemiparesis  Insurance/Certification information:  PT Insurance Information: Medicare  Physician Information:  Referring Practitioner: Laurine Goldberg, MD  Plan of care signed (Y/N):  N  Visit# / total visits:  28/28 3/4    G-Code (if applicable):          LEFS     Medicare Cap (if applicable):  5289 = total amount used, updated 2021    Time in:  11:20  Timed Treatment: 40  Total Treatment Time:  40  ________________________________________________________________________________________    Pain Level:    /10  SUBJECTIVE: Pt notes that he is doing okay. Flustered about his schedule this morning. Reports tightness in his arm and foot. OBJECTIVE: [id number]    Exercise/Equipment Resistance/Repetitions Other comments   NuStep 5 min           Supine leg press  Supine hip flexion 3 min  3 min Manual resistance   Clams #1 x10 RLE    Bridge    Single limb x15  x15    PHE    Bent knee x10  x10 RLE In side-lying this session   PKF  In sidelying this session   Supine hip neuro re-ed 3 min RLE    LAQ    With TB assist   Seated hamstring curls x10 With maxislide        Side-lying knee extension/flexion     Hooklying ADD ball squeeze    With hip flexion   x10    Backwards walking alternating with resistance walking in parallel bars6 lapsRed band for forwards walking, 3 # weight removed residential through to increase stride length. Focusing on pushoff and stride length.    Standing Hip Flexion with facilitation x20 manual facilitation   Slant board 3x30\"   With hamstring stretch   TG    Standing step over object    Minisquat [id number]   FSU x15 BLE6\"   Side-stepping UE support   Standing hip abduction. x10 B     Other Therapeutic Activities:      Manual Treatments:        Modalities:      Test/Measurements:         ASSESSMENT:    Pt tolerated session fairly. Functional activities continue focusing on increasing push off and left leg step length to focus on patient goal of improved gait speed and control. Patient required UE assist on // bars for FSU. Able to perform mod I with handrail for 6\" step. Treatment/Activity Tolerance:   [x]Patient tolerated treatment well [] Patient limited by fatique  []Patient limited by pain [] Patient limited by other medical complications  [] Other:     Goals:    Short term goals  Time Frame for Short term goals: 6 weeks  Short term goal 1: pt will be indep in HEP  Short term goal 2: pt will increase R DF strength by 1/3 muscle grade  Short term goal 3: pt will ambulate with increased glut activation RLE  Short term goal 4: pt will decrease TUG time 10 sec  Short term goal 5: pt will increase LEFS score by 10 points to hit MCID           Plan: [x] Continue per plan of care [] Alter current plan (see comments)   [] Plan of care initiated [] Hold pending MD visit [] Discharge      Plan for Next Session:  Biomechanical correction of problems as they present. Facilitate correct muscle firing patterns and activation with appropriate activities. Progress patient as tolerated.      Re-Certification Due Date:         Signature:  Carrie Sheridan, PT , PT

## 2021-09-02 ENCOUNTER — HOSPITAL ENCOUNTER (OUTPATIENT)
Dept: SPEECH THERAPY | Age: 66
Setting detail: THERAPIES SERIES
Discharge: HOME | End: 2021-09-02
Payer: MEDICARE

## 2021-09-02 PROCEDURE — 92507 TX SP LANG VOICE COMM INDIV: CPT

## 2021-09-02 NOTE — FLOWSHEET NOTE
[facility] [place] [address] [address], [address] [place] [address]   Phone: [phone]  Fax [phone]      Speech-Language Pathology  Daily Treatment Note    Date:  2021    Patient Name:  Gail Owens    :  1955  MRN: [id number]  Restrictions/Precautions:  Limited communication ability due to significant Aphasia  Treatment Diagnosis:    Codes     Aphasia as late effect of cerebrovascular accident (CVA)    -  Primary   Insurance/Certification information: Medicare A & B; [address]   Referring Physician:  Janice Sam. Quinton Velasco MD  Plan of care signed (Y/N):  Y  Visit# / total visits:     Pain level: no c/o pain    Progress Note: [x]  Yes  []  No  Next due by: Visit #10:    Subjective: The pt was seen today for the first time since 8/10/21 2/2 the patient quarantining due to a positive COVID-19 case at his facility. He was pleasant and in good spirits. Noted improved verbal output this date in conversational speech. Objective:   Short-term Goals:  Goal 1: NEW GOAL: The pt will say indivdual phrase and short sentences, in response to therapy tasks, with 90% accuracy, mod cues:  -80% (8/610), mod cues and extra time; targeted via basic picture descriptions    Goal 2: NEW GOAL: The pt will complete basic confrontational and responsive naming with 90% accuracy, min cues  - Confrontational: 70% (7/10) with min cues and extra time  - Responsive:  80% (8/10) with min cues and extra time     Goal 3: NEW GOAL:The pt will demonstrate reading comprehension of single phrases/short sentences with 90% accuracy, mod cues  - 50% (5/10), mod cues; targeted via having him read single phrases/short sentences and answer 'Yes' and 'No' questions about each phrase/sentence. Goal 4: The pt will use an AAC speech generating device to answer given basic questions with 80% accuracy, mod-max cues:  - GOAL DISCONTINUED.  The pt has not been using or bring his iPad and has not been wanting to use AAC communication due to improved verbal communication. Goal 5: The pt will complete basic confrontational and responsive naming with 75% accuracy, mod-max cues:  -  GOAL MET    Goal 6: The pt will say indivdual phrase and short sentences, in response to therapy tasks, with 70% accuracy, mod-max cues:  - GOAL MET    Goal 7: The pt will write single basic words with 50% accuracy, mod-max cues:  - DISCONTINUE THIS GOAL DUE TO LACK OF PROGRESS      Other Treatments:      Assessment:   Continued overall progress is noted. Plan:   Continued Speech Therapy services 2 x week for 6 weeks. Total Treatment Time / Charges     Time in Time out Total Time / units   Cognitive Tx         Speech Tx 1015 1100 45 min / 1 unit   Dysphagia Tx           Signature:     Ruby Kiser MA, [place] Rosales  PX#[id number]  Speech-Language Pathologist  Phone #: [phone]  Fax #: [phone]

## 2021-09-02 NOTE — PROGRESS NOTES
Subjective:  · The pt was seen today for the first time since 8/10/21 2/2 the patient quarantining due to a positive COVID-19 case at his facility. He was pleasant and in good spirits. Noted improved verbal output this date in conversation. .     Objective:   Observation: Further improved diversity of spoken vocabulary. Assessment:   Summary: Again the patient was only seen 3 times since his last progress note due to the pt missing significant time due to the pt's facility being on lock down due to a COVID-19 case there. Despite a reduced number of treatment sessions, the pt continues to demonstrate significant progress. His overall length and complexity of spoken utterance continues to improve. His reading comprehension has also improved. Considering the progress made, continued Speech Therapy services are recommended.  Patient's response to treatment: Pleasant and cooperative. Progress towards goals:    Short-term Goals:  Goal 1: NEW GOAL: The pt will say indivdual phrase and short sentences, in response to therapy tasks, with 90% accuracy, mod cues:  -80% (8/610), mod cues and extra time; targeted via basic picture descriptions     Goal 2: NEW GOAL: The pt will complete basic confrontational and responsive naming with 90% accuracy, min cues  - Confrontational: 70% (7/10) with min cues and extra time  - Responsive:  80% (8/10) with min cues and extra time      Goal 3: NEW GOAL:The pt will demonstrate reading comprehension of single phrases/short sentences with 90% accuracy, mod cues  - 50% (5/10), mod cues; targeted via having him read single phrases/short sentences and answer 'Yes' and 'No' questions about each phrase/sentence.     Goal 4: The pt will use an AAC speech generating device to answer given basic questions with 80% accuracy, mod-max cues:  - GOAL DISCONTINUED.  The pt has not been using or bring his iPad and has not been wanting to use AAC communication due to improved verbal

## 2021-09-07 ENCOUNTER — HOSPITAL ENCOUNTER (OUTPATIENT)
Dept: SPEECH THERAPY | Age: 66
Setting detail: THERAPIES SERIES
Discharge: HOME | End: 2021-09-07
Payer: MEDICARE

## 2021-09-07 ENCOUNTER — HOSPITAL ENCOUNTER (OUTPATIENT)
Dept: PHYSICAL THERAPY | Age: 66
Setting detail: THERAPIES SERIES
Discharge: HOME | End: 2021-09-07
Payer: MEDICARE

## 2021-09-07 NOTE — PROGRESS NOTES
Physical Therapy  Cancellation/No-show Note  Patient Name:  Debbie Kennedy  :  1955   Date:  2021  Cancels to date: 1  No-shows to date: 0    For today's appointment patient:  [x] Cancelled  [] Rescheduled appointment  [] No-show     Reason given by patient:  [] Patient ill  [] Conflicting appointment  [x] No transportation    [] Conflict with work  [] No reason given  [] Other:     Comments:      Electronically signed by:  Rosemarie Vaughn PT

## 2021-09-07 NOTE — FLOWSHEET NOTE
[address] [place] [address] and [address], [address] [place] [address]  Phone: [phone]  Fax [phone]      Speech Therapy  Cancellation/No-show Note  Patient Name:  Linda Oneill  :  1955   Date:  2021  Cancels to Date: 1  No-shows to Date: 1    Patient status for today's appointment patient:  [x]  Cancelled  []  Rescheduled appointment  []  No-show     Reason given by patient:  []  Patient ill  []  Conflicting appointment  [x]  No transportation    []  Conflict with work  []  No reason given  []  Other:     Comments:      Phone call information:   []  Phone call made today to patient at  time at number provided:      []  Patient answered, conversation as follows:    []  Patient did not answer, message left as follows:  [x]  Phone call not made today    Electronically signed by:  Bela Palafox, SLP

## 2021-09-09 ENCOUNTER — HOSPITAL ENCOUNTER (OUTPATIENT)
Dept: SPEECH THERAPY | Age: 66
Setting detail: THERAPIES SERIES
Discharge: HOME | End: 2021-09-09
Payer: MEDICARE

## 2021-09-09 PROCEDURE — 92507 TX SP LANG VOICE COMM INDIV: CPT

## 2021-09-09 NOTE — FLOWSHEET NOTE
[facility]  [facility]. and Therapy, [facility], [address], [address] [place]   Phone: [phone]  Fax [phone]      Speech-Language Pathology  Daily Treatment Note    Date:  2021    Patient Name:  Wally Peres    :  1955  MRN: [id number]  Restrictions/Precautions:  Limited communication ability due to significant Aphasia  Treatment Diagnosis:    Codes     Aphasia as late effect of cerebrovascular accident (CVA)    -  Primary   Insurance/Certification information: Medicare A & B; [address]   Referring Physician:  Anneliese Spring. Armando Turcios MD  Plan of care signed (Y/N):  Y  Visit# / total visits:     Pain level: no c/o pain    Progress Note: []  Yes  [x]  No  Next due by: Visit #10: 1/10 or 10/2/21    Subjective: The pt was in good spirits. He indicated that he went flying in his plane with his brother over the weekend. Objective:   Short-term Goals:  Goal 1: NEW GOAL: The pt will say indivdual phrase and short sentences, in response to therapy tasks, with 90% accuracy, mod cues:  -73% (8/11), mod cues and extra time; targeted via open ended questions and stating the purpose of given items    Goal 2: NEW GOAL: The pt will complete basic confrontational and responsive naming with 90% accuracy, min cues  - Confrontational: 70% (7/10) with min cues and extra time  - Responsive:  70% (7/10) with min cues and extra time     Goal 3: NEW GOAL:The pt will demonstrate reading comprehension of single phrases/short sentences with 90% accuracy, mod cues  - 60% (3/5), mod cues; targeted via having him read single phrases/short sentences and answer 'Yes' and 'No' questions about each phrase/sentence. Goal 4: The pt will use an AAC speech generating device to answer given basic questions with 80% accuracy, mod-max cues:  - GOAL DISCONTINUED.  The pt has not been using or bring his iPad and has not been wanting to use AAC communication due to improved verbal communication. Goal 5: The pt will complete basic confrontational and responsive naming with 75% accuracy, mod-max cues:  -  GOAL MET    Goal 6: The pt will say indivdual phrase and short sentences, in response to therapy tasks, with 70% accuracy, mod-max cues:  - GOAL MET    Goal 7: The pt will write single basic words with 50% accuracy, mod-max cues:  - DISCONTINUE THIS GOAL DUE TO LACK OF PROGRESS      Other Treatments:      Assessment:   Continued overall progress is noted. Plan:   Continued Speech Therapy services 2 x week for 6 weeks. Total Treatment Time / Charges     Time in Time out Total Time / units   Cognitive Tx         Speech Tx 1230 1320 50 min / 1 unit   Dysphagia Tx           Signature:     Lizett Mac MA, [id number] [facility]  PI#[id number]  Speech-Language Pathologist  Phone #: [phone]  Fax #: [phone]

## 2021-09-14 ENCOUNTER — HOSPITAL ENCOUNTER (OUTPATIENT)
Dept: SPEECH THERAPY | Age: 66
Setting detail: THERAPIES SERIES
Discharge: HOME | End: 2021-09-14
Payer: MEDICARE

## 2021-09-14 PROCEDURE — 92507 TX SP LANG VOICE COMM INDIV: CPT

## 2021-09-14 NOTE — FLOWSHEET NOTE
[facility]  [facility]. and Therapy, [place] [address] [address]  [address] [facility], [address] [place] [address]   Phone: [phone]  Fax [phone]      Speech-Language Pathology  Daily Treatment Note    Date:  2021    Patient Name:  Carla Delgado    :  1955  MRN: [id number]  Restrictions/Precautions:  Limited communication ability due to significant Aphasia  Treatment Diagnosis:    Codes     Aphasia as late effect of cerebrovascular accident (CVA)    -  Primary   Insurance/Certification information: Medicare A & B; [address]   Referring Physician:  Racheal Galvan. Hai Archibald MD  Plan of care signed (Y/N):  Y  Visit# / total visits:  3/12   Pain level: no c/o pain    Progress Note: []  Yes  [x]  No  Next due by: Visit #10: 2/10 or 10/2/21    Subjective: The pt was in overall good spirits. He continues to quickly become upset when experiencing difficulty with verbal expression. Objective:   Short-term Goals:  Goal 1: NEW GOAL: The pt will say indivdual phrase and short sentences, in response to therapy tasks, with 90% accuracy, mod cues:  -73% (8/11), mod cues and extra time; targeted via open ended questions and stating the purpose of given items    Goal 2: NEW GOAL: The pt will complete basic confrontational and responsive naming with 90% accuracy, min cues  - Confrontational: 63% (5/8) with min cues and extra time  - Responsive:  75% (6/8) with min cues and extra time     Goal 3: NEW GOAL:The pt will demonstrate reading comprehension of single phrases/short sentences with 90% accuracy, mod cues  - 60% (3/5), mod cues; targeted via having him read single phrases/short sentences and answer 'Yes' and 'No' questions about each phrase/sentence. Goal 4: The pt will use an AAC speech generating device to answer given basic questions with 80% accuracy, mod-max cues:  - GOAL DISCONTINUED.  The pt has not been using or bring his iPad and has not been wanting to use AAC communication due to improved verbal communication. Goal 5: The pt will complete basic confrontational and responsive naming with 75% accuracy, mod-max cues:  -  GOAL MET    Goal 6: The pt will say indivdual phrase and short sentences, in response to therapy tasks, with 70% accuracy, mod-max cues:  - GOAL MET    Goal 7: The pt will write single basic words with 50% accuracy, mod-max cues:  - DISCONTINUE THIS GOAL DUE TO LACK OF PROGRESS      Other Treatments: The therapist had the pt read phrases that indicating raising and falling intonation along with the therapist with max cues and NILSA hand tapping. The pt was able to read some of the words with noted improved verbal fluency saying the phrases. Assessment:   The pt is able to say more word and function words spontaneously. However, he routinely has his connected speech interrupted by inability to say the intended word. He continues to need cues to try to incorporate strategies during these instances. Plan:   Continued Speech Therapy services 2 x week for 6 weeks. Total Treatment Time / Charges     Time in Time out Total Time / units   Cognitive Tx         Speech Tx 1043 1130 47 min / 1 unit   Dysphagia Tx           Signature:     Iliana Rachel MA, [address] [facility]#[id number]  Speech-Language Pathologist  Phone #: [phone]  Fax #: [phone]

## 2021-09-15 ENCOUNTER — APPOINTMENT (OUTPATIENT)
Dept: PHYSICAL THERAPY | Age: 66
End: 2021-09-15
Payer: MEDICARE

## 2021-09-16 ENCOUNTER — HOSPITAL ENCOUNTER (OUTPATIENT)
Dept: SPEECH THERAPY | Age: 66
Setting detail: THERAPIES SERIES
Discharge: HOME | End: 2021-09-16
Payer: MEDICARE

## 2021-09-16 ENCOUNTER — HOSPITAL ENCOUNTER (OUTPATIENT)
Dept: PHYSICAL THERAPY | Age: 66
Setting detail: THERAPIES SERIES
Discharge: HOME | End: 2021-09-16
Payer: MEDICARE

## 2021-09-16 PROCEDURE — 92507 TX SP LANG VOICE COMM INDIV: CPT

## 2021-09-16 PROCEDURE — 97110 THERAPEUTIC EXERCISES: CPT

## 2021-09-16 NOTE — PROGRESS NOTES
[facility]. and Therapy, [facility], [address]  Phone: [phone]  Fax [phone]    Physical Therapy Progress Note    Date:  2021    Patient Name:  Lamont Haque    :  1955  MRN: [id number]  Restrictions/Precautions:    Medical/Treatment Diagnosis Information:  · Diagnosis: CVA, R hemiparesis  Insurance/Certification information:  PT Insurance Information: Medicare  Physician Information:  Referring Practitioner: Erendira García MD  Plan of care signed (Y/N):  N  Visit# / total visits:      G-Code (if applicable):          LEFS  (unable to reassess as no family member present, pt with aphasia)    Medicare Cap (if applicable):  0206 = total amount used, updated 2021  ________________________________________________________________________________________    Pain Level:    /10  SUBJECTIVE: Pt notes that he is doing okay. Still complaining of tightness in his foot and leg. Unable to complete HEP without assistance. OBJECTIVE:   Test/Measurements:    PROM RLE (degrees)  R Hip Extension 0-10: 0-5*  R Hip ABduction 0-45: 0-35*  R Knee Flexion 0-145: 5-125*  PROM LLE (degrees)  LLE PROM: WFL  AROM LLE (degrees)  LLE AROM : WFL     Strength RLE  R Hip Flexion: 3+/5  R Hip Extension: 3+/5  R Hip ABduction: 3/5  R Hip External Rotation: 3/5  R Knee Flexion: 3+/5  R Knee Extension: 3/5  R Ankle Dorsiflexion: 3-/5  R Ankle Plantar flexion: 0/5  R Ankle Inversion: 2/5  R Ankle Eversion: 0/5  R Great Toe Extension: 0/5  R Great Toe Flexion: 0/5       ASSESSMENT:  Pt has completed 32 visits of physical therapy and has shown good improvements with strength, ROM, movement mechanics and functional mobility. Pt does continue with decreased purposeful muscle firing of hip flexor and knee extension, but this has significantly improved to where pt is now able to actively get through ROM.  Pt's gait sequence has steadily improved with glut activation and push-off RLE. Pt's goal continues to be to return to his home, and from a mobility standpoint, is an achievable goal. PT has been able to complete steps with assistance of handrail. Pt would benefit from additional therapy at 1x/wk for 4 weeks to address remaining impairments and focus on stair training for potential return to home.       Treatment/Activity Tolerance:   [x]Patient tolerated treatment well [] Patient limited by fatique  []Patient limited by pain [] Patient limited by other medical complications  [] Other:     Goals:    Short term goals  Time Frame for Short term goals: 6 weeks  Short term goal 1: pt will be indep in HEP - NOT MET  Short term goal 2: pt will increase R DF strength by 1/3 muscle grade - MET  Short term goal 3: pt will ambulate with increased glut activation RLE - MET at times  Short term goal 4: pt will decrease TUG time 10 sec - NOT MET  Short term goal 5: pt will increase LEFS score by 10 points to hit MCID - unable to assess          Plan: [] Continue per plan of care [x] Alter current plan (see comments)   [] Plan of care initiated [] Hold pending MD visit [] Discharge          Signature:  Charbel Bhatt PT , PT              Physician Signature:_______________________________Date:__________________  By signing above (or electronic signature), therapists plan is approved by physician

## 2021-09-16 NOTE — FLOWSHEET NOTE
[facility]. and Therapy, [facility], [facility] [address]  Phone: [phone]  Fax [phone]    Physical Therapy Daily Treatment Note    Date:  2021    Patient Name:  Jose Yang    :  1955  MRN: [id number]  Restrictions/Precautions:    Medical/Treatment Diagnosis Information:  · Diagnosis: CVA, R hemiparesis  Insurance/Certification information:  PT Insurance Information: Medicare  Physician Information:  Referring Practitioner: Devonte Barroso MD  Plan of care signed (Y/N):  N  Visit# / total visits:      G-Code (if applicable):          LEFS     Medicare Cap (if applicable):  3550 = total amount used, updated 2021    Time in:  9:15  Timed Treatment: 30  Total Treatment Time:  30  ________________________________________________________________________________________    Pain Level:    /10  SUBJECTIVE: Pt notes that he is doing okay. Still complaining of tightness in his foot and leg. OBJECTIVE: [id number]    Exercise/Equipment Resistance/Repetitions Other comments   NuStep 5 min           Supine leg press  Supine hip flexion Manual resistance   Clams #1    Bridge    Single limb    PHE    Bent knee In side-lying this session   PKF In sidelying this session   Supine hip neuro re-ed    LAQ    With TB assist   Seated hamstring curls x10 With maxislide        Side-lying knee extension/flexion     Hooklying ADD ball squeeze    With hip flexion       Backwards walking alternating with resistance walking in parallel bars6 lapsRed band for forwards walking, 3 # weight removed assisted through to increase stride length. Focusing on pushoff and stride length. Standing Hip Flexion with facilitation x15 manual facilitation   Slant board 3x30\"   With hamstring stretch   TG    Standing step over object    Minisquat [id number]   FSU x15 BLE6\"   Side-stepping 4 laps // barsUE support   Standing hip abduction.  x10 B Other Therapeutic Activities:      Manual Treatments:        Modalities:      Test/Measurements:         ASSESSMENT:    Pt tolerated session fairly. Functional activities continue focusing on increasing push off and left leg step length to focus on patient goal of improved gait speed and control. Patient required UE assist on // bars for FSU. Able to perform mod I with handrail for 6\" step. Treatment/Activity Tolerance:   [x]Patient tolerated treatment well [] Patient limited by fatique  []Patient limited by pain [] Patient limited by other medical complications  [] Other:     Goals:    Short term goals  Time Frame for Short term goals: 6 weeks  Short term goal 1: pt will be indep in HEP  Short term goal 2: pt will increase R DF strength by 1/3 muscle grade  Short term goal 3: pt will ambulate with increased glut activation RLE  Short term goal 4: pt will decrease TUG time 10 sec  Short term goal 5: pt will increase LEFS score by 10 points to hit MCID           Plan: [x] Continue per plan of care [] Alter current plan (see comments)   [] Plan of care initiated [] Hold pending MD visit [] Discharge      Plan for Next Session:  Biomechanical correction of problems as they present. Facilitate correct muscle firing patterns and activation with appropriate activities. Progress patient as tolerated.      Re-Certification Due Date:         Signature:  Eileen Florence, PT , PT

## 2021-09-16 NOTE — FLOWSHEET NOTE
[facility]  [facility]. and Therapy, [place] [address] [address]  [address] [facility], [address] [place] [address]   Phone: [phone]  Fax [phone]      Speech-Language Pathology  Daily Treatment Note    Date:  2021    Patient Name:  Flo Shepherd    :  1955  MRN: [id number]  Restrictions/Precautions:  Limited communication ability due to significant Aphasia  Treatment Diagnosis:    Codes     Aphasia as late effect of cerebrovascular accident (CVA)    -  Primary   Insurance/Certification information: Medicare A & B; [address]   Referring Physician:  Jahaira Nevarez. Tre Bowie MD  Plan of care signed (Y/N):  Y  Visit# / total visits:     Pain level: no c/o pain    Progress Note: []  Yes  [x]  No  Next due by: Visit #10: 3/10 or 10/2/21    Subjective: The pt was in overall good spirits, seen after PT session. Objective:   Short-term Goals:  Goal 1: NEW GOAL: The pt will say indivdual phrase and short sentences, in response to therapy tasks, with 90% accuracy, mod cues:  - indirectly targeted throughout structured tasks     Goal 2: NEW GOAL: The pt will complete basic confrontational and responsive naming with 90% accuracy, min cues  - Confrontational: 4/10 (40%) with min cues and extra time  - Responsive: DNT    Goal 3: NEW GOAL:The pt will demonstrate reading comprehension of single phrases/short sentences with 90% accuracy, mod cues  - 5/6 (83%), mod cues; targeted via having him read single phrases/short sentences and answer 'Yes' and 'No' questions about each phrase/sentence. Goal 4: The pt will use an AAC speech generating device to answer given basic questions with 80% accuracy, mod-max cues:  - GOAL DISCONTINUED. The pt has not been using or bring his iPad and has not been wanting to use AAC communication due to improved verbal communication. Goal 5:  The pt will complete basic confrontational and responsive naming with 75% accuracy, mod-max cues:  -  GOAL MET    Goal 6: The pt will say indivdual phrase and short sentences, in response to therapy tasks, with 70% accuracy, mod-max cues:  - GOAL MET    Goal 7: The pt will write single basic words with 50% accuracy, mod-max cues:  - DISCONTINUE THIS GOAL DUE TO LACK OF PROGRESS      Other Treatments:  -Treating SLP again had the pt read short formulated phrases along with SLP providing max cues and NILSA hand tapping. The pt was able to read some of the words with noted improved verbal fluency saying the phrases. Assessment:   The pt continues to produce more word and function words spontaneously. During reading comprehension task noted pt stated 'yes' although pointed to 'no' at times during task. Plan:   Continued Speech Therapy services 2 x week for 6 weeks.      Total Treatment Time / Charges     Time in Time out Total Time / units   Cognitive Tx         Speech Tx 1015 1100 45 min / 1 unit   Dysphagia Tx          Signature:    ESMER Sevilla  Speech-language pathologist  [id number]

## 2021-09-21 ENCOUNTER — HOSPITAL ENCOUNTER (OUTPATIENT)
Dept: SPEECH THERAPY | Age: 66
Setting detail: THERAPIES SERIES
Discharge: HOME | End: 2021-09-21
Payer: MEDICARE

## 2021-09-21 ENCOUNTER — APPOINTMENT (OUTPATIENT)
Dept: SPEECH THERAPY | Age: 66
End: 2021-09-21
Payer: MEDICARE

## 2021-09-21 ENCOUNTER — APPOINTMENT (OUTPATIENT)
Dept: PHYSICAL THERAPY | Age: 66
End: 2021-09-21
Payer: MEDICARE

## 2021-09-21 PROCEDURE — 92507 TX SP LANG VOICE COMM INDIV: CPT

## 2021-09-21 NOTE — FLOWSHEET NOTE
wanting to use AAC communication due to improved verbal communication. Goal 5: The pt will complete basic confrontational and responsive naming with 75% accuracy, mod-max cues:  -  GOAL MET    Goal 6: The pt will say indivdual phrase and short sentences, in response to therapy tasks, with 70% accuracy, mod-max cues:  - GOAL MET    Goal 7: The pt will write single basic words with 50% accuracy, mod-max cues:  - DISCONTINUE THIS GOAL DUE TO LACK OF PROGRESS      Other Treatments: The therapist had the pt read phrases that indicating raising and falling intonation along with the therapist with max cues and NILSA hand tapping. The pt was able to read some of the words with noted improved verbal fluency saying the phrases. Assessment:   Continued overall progress in therapy    Plan:   Continued Speech Therapy services 2 x week for 6 weeks. Total Treatment Time / Charges     Time in Time out Total Time / units   Cognitive Tx         Speech Tx 1032 1130 58 min / 1 unit   Dysphagia Tx          Signature:     Kal Posey MA, [address] [facility][id number]  Speech-Language Pathologist  Phone #: [phone]  Fax #: [phone]

## 2021-09-23 ENCOUNTER — HOSPITAL ENCOUNTER (OUTPATIENT)
Dept: PHYSICAL THERAPY | Age: 66
Setting detail: THERAPIES SERIES
Discharge: HOME | End: 2021-09-23
Payer: MEDICARE

## 2021-09-23 ENCOUNTER — HOSPITAL ENCOUNTER (OUTPATIENT)
Dept: SPEECH THERAPY | Age: 66
Setting detail: THERAPIES SERIES
Discharge: HOME | End: 2021-09-23
Payer: MEDICARE

## 2021-09-23 PROCEDURE — 92507 TX SP LANG VOICE COMM INDIV: CPT

## 2021-09-23 PROCEDURE — 97110 THERAPEUTIC EXERCISES: CPT

## 2021-09-23 NOTE — FLOWSHEET NOTE
[facility]. and Therapy, [facility] [place] [facility] [facility], [address]  Phone: [phone]  Fax [phone]    Physical Therapy Daily Treatment Note    Date:  2021    Patient Name:  Gail Owens    :  1955  MRN: [id number]  Restrictions/Precautions:    Medical/Treatment Diagnosis Information:  · Diagnosis: CVA, R hemiparesis  Insurance/Certification information:  PT Insurance Information: Medicare  Physician Information:  Referring Practitioner: Ronel Lim MD  Plan of care signed (Y/N):  N  Visit# / total visits:      G-Code (if applicable):          LEFS     Medicare Cap (if applicable):  7977 = total amount used, updated 2021    Time in:  11:15  Timed Treatment: 45  Total Treatment Time:  45  ________________________________________________________________________________________    Pain Level:    /10  SUBJECTIVE: Nothing new to report. OBJECTIVE: [id number]    Exercise/Equipment Resistance/Repetitions Other comments   NuStep 5 min           Supine leg press  Supine hip flexion 3 min  3 min Manual resistance   Clams #1 x10 RLE    Bridge    Single limb x15  x15    PHE    Bent knee In side-lying this session   PKF In sidelying this session   Supine hip neuro re-ed 3 min RLE   LAQ    With TB assist   Seated hamstring curls x10 With maxislide        Side-lying knee extension/flexion     Hooklying ADD ball squeeze    With hip flexion   x10    Backwards walking alternating with resistance walking in parallel bars6 lapsRed band for forwards walking, 3 # weight removed USP through to increase stride length. Focusing on pushoff and stride length. Standing Hip Flexion with facilitation x15 manual facilitation   Slant board 3x30\"   With hamstring stretch   TG    Standing step over object    Minisquat [id number]   FSU x15 BLE6\"   Side-stepping 4 laps // barsUE support   Standing hip abduction.  x10 B     Other Therapeutic Activities:      Manual Treatments:        Modalities:      Test/Measurements:         ASSESSMENT:    Pt tolerated session fairly. Functional activities continue focusing on increasing push off and left leg step length to focus on patient goal of improved gait speed and control. Treatment/Activity Tolerance:   [x]Patient tolerated treatment well [] Patient limited by fatique  []Patient limited by pain [] Patient limited by other medical complications  [] Other:     Goals:    Short term goals  Time Frame for Short term goals: 6 weeks  Short term goal 1: pt will be indep in HEP  Short term goal 2: pt will increase R DF strength by 1/3 muscle grade  Short term goal 3: pt will ambulate with increased glut activation RLE  Short term goal 4: pt will decrease TUG time 10 sec  Short term goal 5: pt will increase LEFS score by 10 points to hit MCID           Plan: [x] Continue per plan of care [] Alter current plan (see comments)   [] Plan of care initiated [] Hold pending MD visit [] Discharge      Plan for Next Session:  Biomechanical correction of problems as they present. Facilitate correct muscle firing patterns and activation with appropriate activities. Progress patient as tolerated.      Re-Certification Due Date:         Signature:  Bartolome Ro, PT , PT

## 2021-09-23 NOTE — FLOWSHEET NOTE
[facility]  [facility]. and Therapy, [facility],  [address], [address]   Phone: [phone]  Fax [phone]      Speech-Language Pathology  Daily Treatment Note    Date:  2021    Patient Name:  Mary Kay Van    :  1955  MRN: [id number]  Restrictions/Precautions:  Limited communication ability due to significant Aphasia  Treatment Diagnosis:    Codes     Aphasia as late effect of cerebrovascular accident (CVA)    -  Primary   Insurance/Certification information: Medicare A & B; [address]   Referring Physician:  Latesha Robbins. Renae Nevarez MD  Plan of care signed (Y/N):  Y  Visit# / total visits:     Pain level: no c/o pain    Progress Note: []  Yes  [x]  No  Next due by: Visit #10: 5/10 or 10/2/21    Subjective: The pt was in good spirits. He arrived 13 minutes late to the appointment due to his transportation. Objective:   Short-term Goals:  Goal 1: NEW GOAL: The pt will say indivdual phrase and short sentences, in response to therapy tasks, with 90% accuracy, mod cues:  - 80% (8/10), mod cues; targeted by having him finish carrier phrases/sentences with 2 or more word responses     Goal 2: NEW GOAL: The pt will complete basic confrontational and responsive naming with 90% accuracy, min cues  - Confrontational: 60% (6/10) with min cues and extra time  - Responsive: 70% (7/10), min cues and extra tme    Goal 3: NEW GOAL:The pt will demonstrate reading comprehension of single phrases/short sentences with 90% accuracy, mod cues  - Goal targeted indirectly through other treatment tasks. Goal 4: The pt will use an AAC speech generating device to answer given basic questions with 80% accuracy, mod-max cues:  - GOAL DISCONTINUED. The pt has not been using or bring his iPad and has not been wanting to use AAC communication due to improved verbal communication. Goal 5:  The pt will complete basic confrontational and responsive naming with 75% accuracy, mod-max cues:  -  GOAL MET    Goal 6: The pt will say indivdual phrase and short sentences, in response to therapy tasks, with 70% accuracy, mod-max cues:  - GOAL MET    Goal 7: The pt will write single basic words with 50% accuracy, mod-max cues:  - DISCONTINUE THIS GOAL DUE TO LACK OF PROGRESS      Other Treatments: The therapist had the pt read phrases that indicating raising and falling intonation along with the therapist with mod cues and NILSA hand tapping. The pt was able to read more of the words independently today    Assessment:   Continued overall progress in therapy    Plan:   Continued Speech Therapy services 2 x week for 6 weeks. Total Treatment Time / Charges     Time in Time out Total Time / units   Cognitive Tx         Speech Tx 1043 1115 32 min / 1 unit   Dysphagia Tx          Signature:     Anna Marie Matthews MA, [address] [facility][id number]  Speech-Language Pathologist  Phone #: [phone]  Fax #: [phone]

## 2021-09-28 ENCOUNTER — HOSPITAL ENCOUNTER (OUTPATIENT)
Dept: PHYSICAL THERAPY | Age: 66
Setting detail: THERAPIES SERIES
Discharge: HOME | End: 2021-09-28
Payer: MEDICARE

## 2021-09-28 ENCOUNTER — HOSPITAL ENCOUNTER (OUTPATIENT)
Dept: SPEECH THERAPY | Age: 66
Setting detail: THERAPIES SERIES
Discharge: HOME | End: 2021-09-28
Payer: MEDICARE

## 2021-09-28 PROCEDURE — 92507 TX SP LANG VOICE COMM INDIV: CPT

## 2021-09-28 PROCEDURE — 97110 THERAPEUTIC EXERCISES: CPT

## 2021-09-28 NOTE — FLOWSHEET NOTE
Activities:      Manual Treatments:        Modalities:      Test/Measurements:         ASSESSMENT:    Pt tolerated session fairly. Functional activities continue focusing on increasing push off and left leg step length to focus on patient goal of improved gait speed and control. Treatment/Activity Tolerance:   [x]Patient tolerated treatment well [] Patient limited by fatique  []Patient limited by pain [] Patient limited by other medical complications  [] Other:     Goals:    Short term goals  Time Frame for Short term goals: 6 weeks  Short term goal 1: pt will be indep in HEP  Short term goal 2: pt will increase R DF strength by 1/3 muscle grade  Short term goal 3: pt will ambulate with increased glut activation RLE  Short term goal 4: pt will decrease TUG time 10 sec  Short term goal 5: pt will increase LEFS score by 10 points to hit MCID           Plan: [x] Continue per plan of care [] Alter current plan (see comments)   [] Plan of care initiated [] Hold pending MD visit [] Discharge      Plan for Next Session:  Biomechanical correction of problems as they present. Facilitate correct muscle firing patterns and activation with appropriate activities. Progress patient as tolerated.      Re-Certification Due Date:         Signature:  Sneha Eaton PT , PT

## 2021-09-28 NOTE — FLOWSHEET NOTE
[facility]  [facility]. and Therapy, [facility],  Miriam Bruner  [place], [address]   Phone: [phone]  Fax [phone]      Speech-Language Pathology  Daily Treatment Note    Date:  2021    Patient Name:  Lamont Haque    :  1955  MRN: [id number]  Restrictions/Precautions:  Limited communication ability due to significant Aphasia  Treatment Diagnosis:    Codes     Aphasia as late effect of cerebrovascular accident (CVA)    -  Primary   Insurance/Certification information: Medicare A & B; [address]   Referring Physician:  Gabbie Avendaño. Jim Mondragon MD  Plan of care signed (Y/N):  Y  Visit# / total visits:     Pain level: no c/o pain    Progress Note: []  Yes  [x]  No  Next due by: Visit #10: 6/10 or 10/2/21    Subjective: The pt was in good spirits. Noted continued improved verbal output. There is just one more session scheduled after today. The pt was told that further Speech Therapy sessions are recommended (2 x week for an additional 4 weeks) due to the significant progress made in therapy. Objective:   Short-term Goals:  Goal 1: NEW GOAL: The pt will say indivdual phrase and short sentences, in response to therapy tasks, with 90% accuracy, mod cues:  - 90% (9/10), mod cues; targeted by having him state a phrase about functional words  GOAL MET     Goal 2: NEW GOAL: The pt will complete basic confrontational and responsive naming with 90% accuracy, min cues  - Confrontational: 70% (7/10) with min cues and extra time  - Responsive: 90% (9/10), min cues and extra tme    Goal 3: NEW GOAL:The pt will demonstrate reading comprehension of single phrases/short sentences with 90% accuracy, mod cues  - 60% (6/10), mod cues; targeted by having him read a single phrase and answer a basic yes/no question about what he read; Some yes/no reversals noted where he would say yes but point to no and vice versa.      Goal 4: The pt will use an AAC speech generating device to answer given basic questions with 80% accuracy, mod-max cues:  - GOAL DISCONTINUED. The pt has not been using or bring his iPad and has not been wanting to use AAC communication due to improved verbal communication. Goal 5: The pt will complete basic confrontational and responsive naming with 75% accuracy, mod-max cues:  -  GOAL MET    Goal 6: The pt will say indivdual phrase and short sentences, in response to therapy tasks, with 70% accuracy, mod-max cues:  - GOAL MET    Goal 7: The pt will write single basic words with 50% accuracy, mod-max cues:  - DISCONTINUE THIS GOAL DUE TO LACK OF PROGRESS       NEW GOAL:  The pt will say indivdual sentences, in response to therapy tasks, with 90% accuracy, mod-max cues      Other Treatments:  - The therapist had the pt read phrases that indicating raising and falling intonation along with the therapist with mod cues and NILSA hand tapping. The pt was able to read more of the words independently today despite longer phrases targeted. - Basic spelling panda requiring him to rearrange 3-4 letters to form basic words: 100% (15/15), he is unable to spell the same words given a moveable alphabet. Assessment:   Continued overall progress in therapy. He can now say more words when speaking but his utterances are still often interrupted due to word-finding difficulty. Plan:   Continued Speech Therapy services 2 x week for 6 weeks. Total Treatment Time / Charges     Time in Time out Total Time / units   Cognitive Tx         Speech Tx 6411 6337 60 min / 1 unit   Dysphagia Tx          Signature:     María Melara MA, [facility]  [id number]  Speech-Language Pathologist  Phone #: [phone]  Fax #: [phone]

## 2021-09-30 ENCOUNTER — HOSPITAL ENCOUNTER (OUTPATIENT)
Dept: SPEECH THERAPY | Age: 66
Setting detail: THERAPIES SERIES
Discharge: HOME | End: 2021-09-30
Payer: MEDICARE

## 2021-09-30 PROCEDURE — 92507 TX SP LANG VOICE COMM INDIV: CPT

## 2021-09-30 NOTE — FLOWSHEET NOTE
[facility], [facility],  [address], [address]   Phone: [phone]  Fax [phone]      Speech-Language Pathology  Daily Treatment Note    Date:  2021    Patient Name:  Romulo Ann    :  1955  MRN: [id number]  Restrictions/Precautions:  Limited communication ability due to significant Aphasia  Treatment Diagnosis:    Codes     Aphasia as late effect of cerebrovascular accident (CVA)    -  Primary   Insurance/Certification information: Medicare A & B; [address]   Referring Physician:  Sven Flower. Marquita Monroy MD  Plan of care signed (Y/N):  Y  Visit# / total visits:     Pain level: no c/o pain    Progress Note: []  Yes  [x]  No  Next due by: Visit #10: 7/10 or 10/2/21    Subjective: The pt has been scheduled for additional sessions. His sister-in-law asked the therapist about the pt's comprehension of time, money and numbers. For this reason the pt's comprehension and production of these aspects were assessed today. Objective:   Further Assessment of numbers, money and time:  The pt struggles to comprehend and label basic numbers if they are not in the logical order (1,2, 3, 4, etc.). He was able to ID clock hand placement for the hour and if the time was the hour and a half. However, he was unable to comprehend quarter after or till or other clock times by placing the hands in the correct location. He is able to ID basic math concepts. For instance, when given x 2, 5 dollar bills he is able to ID if the amount is or is not $10 vs $20 via pointing to yes/no cards. However, he was unable to ID that a $5 and x 2, $1 equaled $7. Will give the pt apps to target basic counting, math and time at home. A goal was added for comprehension and production of basic numbers 1-10.      Short-term Goals:  Goal 1: NEW GOAL: The pt will say indivdual sentences, in response to therapy tasks, with 90% accuracy, mod-max cues:    Goal 2: NEW GOAL: The pt will complete basic confrontational and responsive naming with 90% accuracy, min cues  - Goal targeted indirectly through other treatment tasks. Goal 3: NEW GOAL:The pt will demonstrate reading comprehension of single phrases/short sentences with 90% accuracy, mod cues  - Goal targeted indirectly through other treatment tasks. Goal 4: NEW GOAL: The pt will verbally identify and label numbers 1-10 with the numbers being out of logical order, 90% accuracy, mod cues    Goal 5: The pt will complete basic confrontational and responsive naming with 75% accuracy, mod-max cues:  -  GOAL MET    Goal 6: The pt will say indivdual phrase and short sentences, in response to therapy tasks, with 70% accuracy, mod-max cues:  - GOAL MET    Goal 7: The pt will write single basic words with 50% accuracy, mod-max cues:  - DISCONTINUE THIS GOAL DUE TO LACK OF PROGRESS     Goal 8: NEW GOAL: The pt will say indivdual phrase and short sentences, in response to therapy tasks, with 90% accuracy, mod cues:  - GOAL MET     Goal 9: The pt will use an AAC speech generating device to answer given basic questions with 80% accuracy, mod-max cues:  - GOAL DISCONTINUED. The pt has not been using or bring his iPad and has not been wanting to use AAC communication due to improved verbal communication. Other Treatments:      Assessment:   Again, the assessment of time, money and numbers was completed. See the above objective section for more details. Plan:   Continued Speech Therapy services 2 x week for 6 weeks. Total Treatment Time / Charges     Time in Time out Total Time / units   Cognitive Tx         Speech Tx 1230 1315 45 min / 1 unit   Dysphagia Tx          Signature:     Radha Vela MA, [id number] [facility]#[id number]  Speech-Language Pathologist  Phone #: [phone]  Fax #: [phone]

## 2021-10-05 ENCOUNTER — HOSPITAL ENCOUNTER (OUTPATIENT)
Dept: SPEECH THERAPY | Age: 66
Setting detail: THERAPIES SERIES
Discharge: HOME | End: 2021-10-05
Payer: MEDICARE

## 2021-10-05 PROCEDURE — 92507 TX SP LANG VOICE COMM INDIV: CPT

## 2021-10-05 NOTE — PROGRESS NOTES
[facility], [facility], [address], [address]   Phone: [phone]  Fax [phone]        Speech Therapy Progress Note         The following patient has been evaluated for therapy services. Please review the attached summary of the patient's plan of care, and verify that you agree with plan for additional therapy services at this time. Thank you for the referral of this patient. Please sign the attached certification form. Physician signature_______________________ Date________________      Fax to: [facility] [phone]         Date: 10/5/2021        Patient Name:  Snow Parikh    :  1955  MRN: [id number]  Restrictions/Precautions:  Limited communication ability due to significant Aphasia  Treatment Diagnosis:     Codes     Aphasia as late effect of cerebrovascular accident ([address]   Insurance/Certification information: Medicare A & B; [address]   Referring Physician:  Gertrude Carlson. Saravanan Hoffman MD  Plan of care signed (Y/N):  Y  Visit# / total visits:     Pain level: No reported or suspected pain noted           Time Period for Report:  9/3/21 to 10/5/21   Cancels/No-shows to date:  None    Plan of Care/Treatment to date:  [x] Speech-Language Evaluation/Treatment    [] Dysphagia Evaluation/Treatment        [] Dysphagia Treatment via Neuromuscular Electrical Stimulation (NMES)   [] Modified Barium Swallowing Study  [] Fiberoptic Endoscopic Evaluation of Swallowing (FEES)    [] Cognitive-Linguistic Skills Development  [] Voice evaluation and Treatment      [] Evaluation, modification, and Training of Voice Prosthetic     [] Evaluation for Speech-Generating Augmentative and Alternative Communication Device   [] Therapeutic Services for the use of Speech-Generating Device.    [] Other:     Significant Findings At Last Visit/Comments:    Subjective:  · The pt was pleasant and in good spirits. The pt's sister-in-law recently asked the therapist about the pt's comprehension of time, money and numbers. For this reason the pt's comprehension and production of these aspects were assessed on 9/30/21    Objective:   Observation: Increasing length of spoken utterances overall.  Further Testing:   Further Assessment of numbers, money and time:  The pt struggles to comprehend and label basic numbers if they are not in the logical order (1,2, 3, 4, etc.). He was able to ID clock hand placement for the hour and if the time was the hour and a half. However, he was unable to comprehend quarter after or till or other clock times by placing the hands in the correct location. He is able to ID basic math concepts. For instance, when given x 2, 5 dollar bills he is able to ID if the amount is or is not $10 vs $20 via pointing to yes/no cards. However, he was unable to ID that a $5 and x 2, $1 equaled $7. Will give the pt apps to target basic counting, math and time at home. A goal was added for comprehension and production of basic numbers 1-10. Assessment:   Summary: The pt continues to make steady gains in therapy. His length of spoken utterances continue to increase. He is often able to say spontaneous phrases and shorter sentences. Word-finding difficulty still interrupts his connected speech. His ability to read single words and phrases also continues to improve. Therapy will continue to target these areas as well as comprehension and production of numbers. See the rest of this report for more details.  Patient's response to treatment: Pleasant and cooperative.      Progress towards goals:    Short-term Goals:  Goal 1: NEW GOAL: The pt will say indivdual sentences, in response to therapy tasks, with 90% accuracy, mod-max cues:  - 75% (6/8), mod-max cues and extra time  GOAL PARTIALLY MET     Goal 2: NEW GOAL: The pt will complete basic confrontational and responsive naming with 90% accuracy, min cues  - Basic Confrontational namin% (17/20), min cues  - Basic Responsive namin% (10/10), min cues  GOAL PARTIALLY MET     Goal 3: NEW GOAL:The pt will demonstrate reading comprehension of single phrases/short sentences with 90% accuracy, mod cues  - 70% (7/10), mod cues  GOAL PARTIALLY MET     Goal 4: NEW GOAL: The pt will verbally identify and label numbers 1-10 with the numbers being out of logical order, 90% accuracy, mod cues:  - 80% (8/10), mod cues  GOAL PARTIALLY MET     Goal 5: The pt will complete basic confrontational and responsive naming with 75% accuracy, mod-max cues:  -  GOAL MET     Goal 6: The pt will say indivdual phrase and short sentences, in response to therapy tasks, with 70% accuracy, mod-max cues:  - GOAL MET     Goal 7: The pt will write single basic words with 50% accuracy, mod-max cues:  - DISCONTINUE THIS GOAL DUE TO LACK OF PROGRESS      Goal 8: NEW GOAL: The pt will say indivdual phrase and short sentences, in response to therapy tasks, with 90% accuracy, mod cues:  - GOAL MET     Goal 9: The pt will use an AAC speech generating device to answer given basic questions with 80% accuracy, mod-max cues:  - GOAL DISCONTINUED. The pt has not been using or bring his iPad and has not been wanting to use AAC communication due to improved verbal communication.        Current Frequency/Duration:  # Days per week: [] 1 day # Weeks: [] 1 week [x] 4 weeks      [x] 2 days   [] 2 weeks [] 5 weeks      [] 3 days   [] 3 weeks [] 6 weeks     Rehab Potential: [] Excellent [x] Good [] Fair  [] Poor     Goal Status:  [] Achieved [x] Partially Achieved  [] Not Achieved     Patient Status: [] Continue per initial plan of Care     [] Patient now discharged     [x] Additional visits requested, Please re-certify for additional visits:      Requested frequency/duration: 2 X/week for 4 weeks    Electronically signed by:     Markus Bonilla MA, CCC-SLP  [id number]  Speech-Language Pathologist    Phone: [phone]  Fax: [phone]    If you have any questions or concerns, please don't hesitate to call.   Thank you for your referral.

## 2021-10-05 NOTE — FLOWSHEET NOTE
[facility]  [facility]. and Therapy, [facility], [address], [address]   Phone: [phone]  Fax [phone]      Speech-Language Pathology  Daily Treatment Note    Date:  10/5/2021    Patient Name:  Kennard Olszewski    :  1955  MRN: [id number]  Restrictions/Precautions:  Limited communication ability due to significant Aphasia  Treatment Diagnosis:    Codes     Aphasia as late effect of cerebrovascular accident (CVA)    -  Primary   Insurance/Certification information: Medicare A & B; [address]   Referring Physician:  Gema Prasad. Irish Villa MD  Plan of care signed (Y/N):  Y  Visit# / total visits:     Pain level: no c/o pain    Progress Note: [x]  Yes  []  No  Next due by: Visit #10:     Subjective: The pt was in overall good spirits. Objective:   Short-term Goals:  Goal 1: NEW GOAL: The pt will say indivdual sentences, in response to therapy tasks, with 90% accuracy, mod-max cues:  - 75% (6/8), mod-max cues and extra time; targeted by having the pt say a sentence that contains a given word. Goal 2: NEW GOAL: The pt will complete basic confrontational and responsive naming with 90% accuracy, min cues  - Basic Confrontational namin% (17/20), min cues  - Basic Responsive namin% (10/10), min cues    Goal 3: NEW GOAL:The pt will demonstrate reading comprehension of single phrases/short sentences with 90% accuracy, mod cues  - 70% (7/10), mod cues; targeted by having him read a single phrase and answer a basic yes/no question about what he read; Some yes/no reversals noted where he would say yes but point to no and vice versa. Goal 4: NEW GOAL: The pt will verbally identify and label numbers 1-10 with the numbers being out of logical order, 90% accuracy, mod cues:  - 80% (8/10), mod cues; the pt tended to count in order 1-10 to determine the given number. Goal 5:  The pt will complete basic confrontational and responsive naming with 75% accuracy, mod-max cues:  -  GOAL MET    Goal 6: The pt will say indivdual phrase and short sentences, in response to therapy tasks, with 70% accuracy, mod-max cues:  - GOAL MET    Goal 7: The pt will write single basic words with 50% accuracy, mod-max cues:  - DISCONTINUE THIS GOAL DUE TO LACK OF PROGRESS     Goal 8: NEW GOAL: The pt will say indivdual phrase and short sentences, in response to therapy tasks, with 90% accuracy, mod cues:  - GOAL MET     Goal 9: The pt will use an AAC speech generating device to answer given basic questions with 80% accuracy, mod-max cues:  - GOAL DISCONTINUED. The pt has not been using or bring his iPad and has not been wanting to use AAC communication due to improved verbal communication. Other Treatments:      Assessment:   Overall noted improvement toward current goals. Plan:   Continued Speech Therapy services 2 x week for 6 weeks. Total Treatment Time / Charges     Time in Time out Total Time / units   Cognitive Tx         Speech Tx 1035 1120 45 min / 1 unit   Dysphagia Tx          Signature:     Marry Aquino MA, [id number] [facility]#[id number]  Speech-Language Pathologist  Phone #: [phone]  Fax #: [phone]

## 2021-10-07 ENCOUNTER — HOSPITAL ENCOUNTER (OUTPATIENT)
Dept: SPEECH THERAPY | Age: 66
Setting detail: THERAPIES SERIES
Discharge: HOME | End: 2021-10-07
Payer: MEDICARE

## 2021-10-07 PROCEDURE — 92507 TX SP LANG VOICE COMM INDIV: CPT

## 2021-10-07 NOTE — FLOWSHEET NOTE
[facility]  [facility]. and Therapy, [facility], [address], [address]   Phone: [phone]  Fax [phone]      Speech-Language Pathology  Daily Treatment Note    Date:  10/7/2021    Patient Name:  Abran Ponce    :  1955  MRN: [id number]  Restrictions/Precautions:  Limited communication ability due to significant Aphasia  Treatment Diagnosis:    Codes     Aphasia as late effect of cerebrovascular accident (CVA)    -  Primary   Insurance/Certification information: Medicare A & B; [address]   Referring Physician:  Esmer Burnett. Brittany Carballo MD  Plan of care signed (Y/N):  Y  Visit# / total visits:  10/12   Pain level: no c/o pain    Progress Note: []  Yes  [x]  No  Next due by: Visit #10: 1/10 or 21    Subjective: The pt was his typically pleasant self. He appeared more easily frustrated with his verbal expression deficits this date. Objective:   Short-term Goals:  Goal 1: NEW GOAL: The pt will say indivdual sentences, in response to therapy tasks, with 90% accuracy, mod-max cues:  - 70% (7/10), mod-max cues and extra time; targeted by describing given picture scenes. Goal 2: NEW GOAL: The pt will complete basic confrontational and responsive naming with 90% accuracy, min cues  - Basic Confrontational namin% (7/10), min cues  - Basic Responsive namin% (10/10), min cues    Goal 3: NEW GOAL:The pt will demonstrate reading comprehension of single phrases/short sentences with 90% accuracy, mod cues  - Goal targeted indirectly through other treatment tasks. Goal 4: NEW GOAL: The pt will verbally identify and label numbers 1-10 with the numbers being out of logical order, 90% accuracy, mod cues:  - 50% (5/10), mod cues; the pt tended to count in order 1-10 to determine the given number; More difficulty possibly due to the therapist only showing numbers on flash cards without the written word. Goal 5:  The pt will complete basic confrontational and responsive naming with 75% accuracy, mod-max cues:  -  GOAL MET    Goal 6: The pt will say indivdual phrase and short sentences, in response to therapy tasks, with 70% accuracy, mod-max cues:  - GOAL MET    Goal 7: The pt will write single basic words with 50% accuracy, mod-max cues:  - DISCONTINUE THIS GOAL DUE TO LACK OF PROGRESS     Goal 8: NEW GOAL: The pt will say indivdual phrase and short sentences, in response to therapy tasks, with 90% accuracy, mod cues:  - GOAL MET     Goal 9: The pt will use an AAC speech generating device to answer given basic questions with 80% accuracy, mod-max cues:  - GOAL DISCONTINUED. The pt has not been using or bring his iPad and has not been wanting to use AAC communication due to improved verbal communication. Other Treatments:      Assessment:   Overall noted improvement toward current goals. Plan:   Continued Speech Therapy services 2 x week for 6 weeks. Total Treatment Time / Charges     Time in Time out Total Time / units   Cognitive Tx         Speech Tx 1015 1100 45 min / 1 unit   Dysphagia Tx          Signature:     Chadwick Acosta MA, [id number] [place] [address]#[id number]  Speech-Language Pathologist  Phone #: [phone]  Fax #: [phone]

## 2021-10-12 ENCOUNTER — APPOINTMENT (OUTPATIENT)
Dept: SPEECH THERAPY | Age: 66
End: 2021-10-12
Payer: MEDICARE

## 2021-10-14 ENCOUNTER — HOSPITAL ENCOUNTER (OUTPATIENT)
Dept: SPEECH THERAPY | Age: 66
Setting detail: THERAPIES SERIES
Discharge: HOME | End: 2021-10-14
Payer: MEDICARE

## 2021-10-14 PROCEDURE — 92507 TX SP LANG VOICE COMM INDIV: CPT

## 2021-10-14 NOTE — FLOWSHEET NOTE
[facility]  [facility]. and Therapy, [facility],  [address], [address]   Phone: [phone]  Fax [phone]      Speech-Language Pathology  Daily Treatment Note    Date:  10/14/2021    Patient Name:  Kennard Olszewski    :  1955  MRN: [id number]  Restrictions/Precautions:  Limited communication ability due to significant Aphasia  Treatment Diagnosis:    Codes     Aphasia as late effect of cerebrovascular accident (CVA)    -  Primary   Insurance/Certification information: Medicare A & B; [address]   Referring Physician:  Gema Prasad. Irish Villa MD  Plan of care signed (Y/N):  Y  Visit# / total visits:     Pain level: no c/o pain    Progress Note: []  Yes  [x]  No  Next due by: Visit #10: 2/10 or 21    Subjective: The pt was in good spirits. Noted increased length of spontaneous utterances today. Objective:   Short-term Goals:  Goal 1: NEW GOAL: The pt will say indivdual sentences, in response to therapy tasks, with 90% accuracy, mod-max cues:  - 80% (8/10), mod-max cues and extra time; targeted by answering open-ended questions. Goal 2: NEW GOAL: The pt will complete basic confrontational and responsive naming with 90% accuracy, min cues  - Basic Confrontational namin% (8/10), min cues  - Basic Responsive namin% (8/10), min cues    Goal 3: NEW GOAL:The pt will demonstrate reading comprehension of single phrases/short sentences with 90% accuracy, mod cues  - 80% (8/10), mod cues; targeted by having him read a single phrase and then point to the correct picture (from a field of 2-4 pictures) that best matches the given phrase he read: 80% (8/10)      Goal 4: NEW GOAL: The pt will verbally identify and label numbers 1-10 with the numbers being out of logical order, 90% accuracy, mod cues:  - 70% (7/10), mod cues; the pt tended to count in order 1-10 to determine the given number;     Goal 5:  The pt will complete basic confrontational and

## 2021-10-19 ENCOUNTER — HOSPITAL ENCOUNTER (OUTPATIENT)
Dept: SPEECH THERAPY | Age: 66
Setting detail: THERAPIES SERIES
Discharge: HOME | End: 2021-10-19
Payer: MEDICARE

## 2021-10-19 PROCEDURE — 92507 TX SP LANG VOICE COMM INDIV: CPT

## 2021-10-19 NOTE — FLOWSHEET NOTE
[facility]  [facility]. and Therapy, [facility], [address], [address]   Phone: [phone]  Fax [phone]      Speech-Language Pathology  Daily Treatment Note    Date:  10/19/2021    Patient Name:  Paris Velasco    :  1955  MRN: [id number]  Restrictions/Precautions:  Limited communication ability due to significant Aphasia  Treatment Diagnosis:    Codes     Aphasia as late effect of cerebrovascular accident (CVA)    -  Primary   Insurance/Certification information: Medicare A & B; [address]   Referring Physician:  Martine Martinez. Primitivo Lazo MD  Plan of care signed (Y/N):  Y  Visit# / total visits:     Pain level: no c/o pain    Progress Note: []  Yes  [x]  No  Next due by: Visit #10: 3/10 or 21    Subjective: The pt was in good spirits. Noted increased length of spontaneous utterances today. Objective:   Short-term Goals:  Goal 1: NEW GOAL: The pt will say indivdual sentences, in response to therapy tasks, with 90% accuracy, mod-max cues:  - 80% (8/10), mod-max cues and extra time; targeted by answering open-ended questions. Goal 2: NEW GOAL: The pt will complete basic confrontational and responsive naming with 90% accuracy, min cues  - Basic Confrontational namin% (6/10), min cues  - Basic Responsive namin% (8/10), min cues    Goal 3: NEW GOAL:The pt will demonstrate reading comprehension of single phrases/short sentences with 90% accuracy, mod cues  - 70% (7/10), mod cues; targeted by having him read a single phrase and then answer basic yes/no questions about what he read. Goal 4: NEW GOAL: The pt will verbally identify and label numbers 1-10 with the numbers being out of logical order, 90% accuracy, mod cues:  - 90% (9/10), mod cues; the pt tended to count in order 1-10 to determine the given number;     Goal 5:  The pt will complete basic confrontational and responsive naming with 75% accuracy, mod-max cues:  -  GOAL MET    Goal 6: The pt will say indivdual phrase and short sentences, in response to therapy tasks, with 70% accuracy, mod-max cues:  - GOAL MET    Goal 7: The pt will write single basic words with 50% accuracy, mod-max cues:  - DISCONTINUE THIS GOAL DUE TO LACK OF PROGRESS     Goal 8: NEW GOAL: The pt will say indivdual phrase and short sentences, in response to therapy tasks, with 90% accuracy, mod cues:  - GOAL MET     Goal 9: The pt will use an AAC speech generating device to answer given basic questions with 80% accuracy, mod-max cues:  - GOAL DISCONTINUED. The pt has not been using or bring his iPad and has not been wanting to use AAC communication due to improved verbal communication. Other Treatments:      Assessment:   Continued increased frequency of spontaneous spoken sentences noted. .    Plan:   Continued Speech Therapy services 2 x week for 6 weeks. Total Treatment Time / Charges     Time in Time out Total Time / units   Cognitive Tx         Speech Tx 1046 1133 47 min / 1 unit   Dysphagia Tx          Signature:     Hailey Pacheco MA, [address]  TP#[id number]  Speech-Language Pathologist  Phone #: [phone]  Fax #: [phone]

## 2021-10-21 ENCOUNTER — HOSPITAL ENCOUNTER (OUTPATIENT)
Dept: SPEECH THERAPY | Age: 66
Setting detail: THERAPIES SERIES
Discharge: HOME | End: 2021-10-21
Payer: MEDICARE

## 2021-10-21 PROCEDURE — 92507 TX SP LANG VOICE COMM INDIV: CPT

## 2021-10-21 NOTE — FLOWSHEET NOTE
[facility], [facility], [address] [place], [place] [address], [address]   Phone: [phone]  Fax [phone]      Speech-Language Pathology  Daily Treatment Note    Date:  10/21/2021    Patient Name:  Danuta Hill    :  1955  MRN: [id number]  Restrictions/Precautions:  Limited communication ability due to significant Aphasia  Treatment Diagnosis:    Codes     Aphasia as late effect of cerebrovascular accident (CVA)    -  Primary   Insurance/Certification information: Medicare A & B; [address]   Referring Physician:  Wm Mcclure MD  Plan of care signed (Y/N):  Y  Visit# / total visits:     Pain level: no c/o pain    Progress Note: []  Yes  [x]  No  Next due by: Visit #10: 4/10 or 21    Subjective: The pt continues to demonstrate overall progress in therapy. He still become frustrated when he encounters difficulty with verbal expression but appears to have it better controlled now. Objective:   Short-term Goals:  Goal 1: NEW GOAL: The pt will say indivdual sentences, in response to therapy tasks, with 90% accuracy, mod-max cues:  - 80% (8/10), mod-max cues and extra time; targeted by describing picture scenes. Goal 2: NEW GOAL: The pt will complete basic confrontational and responsive naming with 90% accuracy, min cues  - Basic Confrontational namin% (8/10), min cues  - Basic Responsive namin% (9/10), min cues    Goal 3: NEW GOAL:The pt will demonstrate reading comprehension of single phrases/short sentences with 90% accuracy, mod cues  - 67% (4/6), mod cues; targeted by having him read a single phrase and then answer basic yes/no questions about what he read. Goal 4: NEW GOAL: The pt will verbally identify and label numbers 1-10 with the numbers being out of logical order, 90% accuracy, mod cues:  - 80% (8/10), mod cues; the pt tended to count in order 1-10 to determine the given number;     Goal 5:  The pt will complete basic confrontational and responsive naming with 75% accuracy, mod-max cues:  -  GOAL MET    Goal 6: The pt will say indivdual phrase and short sentences, in response to therapy tasks, with 70% accuracy, mod-max cues:  - GOAL MET    Goal 7: The pt will write single basic words with 50% accuracy, mod-max cues:  - DISCONTINUE THIS GOAL DUE TO LACK OF PROGRESS     Goal 8: NEW GOAL: The pt will say indivdual phrase and short sentences, in response to therapy tasks, with 90% accuracy, mod cues:  - GOAL MET     Goal 9: The pt will use an AAC speech generating device to answer given basic questions with 80% accuracy, mod-max cues:  - GOAL DISCONTINUED. The pt has not been using or bring his iPad and has not been wanting to use AAC communication due to improved verbal communication. Other Treatments:  - Reading phrases with the therapist that had raising and falling intonation: The pt continues to be able to read some of the words independently out loud when completing these tasks. Assessment:   Overall progress continues to be noted. Plan:   Continued Speech Therapy services 2 x week for 6 weeks. Total Treatment Time / Charges     Time in Time out Total Time / units   Cognitive Tx         Speech Tx 1044 2260 45 min / 1 unit   Dysphagia Tx          Signature:     Adriana Niño MA, [address] [facility]  [id number]  Speech-Language Pathologist  Phone #: [phone]  Fax #: [phone]

## 2021-10-26 ENCOUNTER — HOSPITAL ENCOUNTER (OUTPATIENT)
Dept: SPEECH THERAPY | Age: 66
Setting detail: THERAPIES SERIES
Discharge: HOME | End: 2021-10-26
Payer: MEDICARE

## 2021-10-26 PROCEDURE — 92507 TX SP LANG VOICE COMM INDIV: CPT

## 2021-10-26 NOTE — FLOWSHEET NOTE
[facility], [facility], [address], [address]   Phone: [phone]  Fax [phone]      Speech-Language Pathology  Daily Treatment Note    Date:  10/26/2021    Patient Name:  Vika Davila    :  1955  MRN: [id number]  Restrictions/Precautions:  Limited communication ability due to significant Aphasia  Treatment Diagnosis:    Codes     Aphasia as late effect of cerebrovascular accident (CVA)    -  Primary   Insurance/Certification information: Medicare A & B; [address]   Referring Physician:  Sharath Thornton. Dorothea Bernard MD  Plan of care signed (Y/N):  Y  Visit# / total visits:  3/12   Pain level: no c/o pain    Progress Note: []  Yes  [x]  No  Next due by: Visit #10: 5/10 or 21    Subjective: The pt was in good spirits. He reported more frustration with his difficulty expressing himself than he typically has lately. Objective:   Short-term Goals:  Goal 1: NEW GOAL: The pt will say indivdual sentences, in response to therapy tasks, with 90% accuracy, mod-max cues:  - 80% (8/10), mod-max cues and extra time; targeted by having him say a self-generated sentence containing a given word. Goal 2: NEW GOAL: The pt will complete basic confrontational and responsive naming with 90% accuracy, min cues  - Basic Confrontational namin% (9/10), min cues  - Basic Responsive namin% (8/10), min cues    Goal 3: NEW GOAL:The pt will demonstrate reading comprehension of single phrases/short sentences with 90% accuracy, mod cues  - 40% (2/5), mod cues; targeted by having him read a single phrase and then answer basic yes/no questions about what he read.      Goal 4: NEW GOAL: The pt will verbally identify and label numbers 1-10 with the numbers being out of logical order, 90% accuracy, mod cues:  - 90% (8/10), mod cues; the pt tended to count in order 1-10 to determine the given number  WILL D/C GOAL AS MET IF ABLE TO MAINTAIN ACCURACY Goal 5: The pt will complete basic confrontational and responsive naming with 75% accuracy, mod-max cues:  -  GOAL MET    Goal 6: The pt will say indivdual phrase and short sentences, in response to therapy tasks, with 70% accuracy, mod-max cues:  - GOAL MET    Goal 7: The pt will write single basic words with 50% accuracy, mod-max cues:  - DISCONTINUE THIS GOAL DUE TO LACK OF PROGRESS     Goal 8: NEW GOAL: The pt will say indivdual phrase and short sentences, in response to therapy tasks, with 90% accuracy, mod cues:  - GOAL MET     Goal 9: The pt will use an AAC speech generating device to answer given basic questions with 80% accuracy, mod-max cues:  - GOAL DISCONTINUED. The pt has not been using or bring his iPad and has not been wanting to use AAC communication due to improved verbal communication. Other Treatments:  - Reading phrases with the therapist that had raising and falling intonation: The pt continues to be able to read some of the words independently out loud when completing these tasks. Assessment:   Overall progress continues to be noted. Plan:   Continued Speech Therapy services 2 x week for 6 weeks. Total Treatment Time / Charges     Time in Time out Total Time / units   Cognitive Tx         Speech Tx 1048 2173 45 min / 1 unit   Dysphagia Tx          Signature:     Miladis Rogers MA, [address] [facility]  [id number]  Speech-Language Pathologist  Phone #: [phone]  Fax #: [phone]

## 2021-10-28 ENCOUNTER — HOSPITAL ENCOUNTER (OUTPATIENT)
Dept: SPEECH THERAPY | Age: 66
Setting detail: THERAPIES SERIES
Discharge: HOME | End: 2021-10-28
Payer: MEDICARE

## 2021-10-28 PROCEDURE — 92507 TX SP LANG VOICE COMM INDIV: CPT

## 2021-10-28 NOTE — FLOWSHEET NOTE
[facility], [facility],  [address], [address]   Phone: [phone]  Fax [phone]      Speech-Language Pathology  Daily Treatment Note    Date:  10/28/2021    Patient Name:  Maday Manley    :  1955  MRN: [id number]  Restrictions/Precautions:  Limited communication ability due to significant Aphasia  Treatment Diagnosis:    Codes     Aphasia as late effect of cerebrovascular accident (CVA)    -  Primary   Insurance/Certification information: Medicare A & B; [address]   Referring Physician:  Mendel Moos. Pancho Rebolledo MD  Plan of care signed (Y/N):  Y  Visit# / total visits:     Pain level: no c/o pain    Progress Note: []  Yes  [x]  No  Next due by: Visit #10: 6/10 or 21    Subjective: The pt again was more quickly frustrated when verbal expression difficulty was encountered. Objective:   Short-term Goals:  Goal 1: NEW GOAL: The pt will say indivdual sentences, in response to therapy tasks, with 90% accuracy, mod-max cues:  - 63% (5/8), mod-max cues and extra time; targeted by having him say a self-generated sentence containing a given word. Goal 2: NEW GOAL: The pt will complete basic confrontational and responsive naming with 90% accuracy, min cues  - Basic Responsive namin% (5/6), min cues    Goal 3: NEW GOAL:The pt will demonstrate reading comprehension of single phrases/short sentences with 90% accuracy, mod cues  - Goal targeted indirectly through other treatment tasks. Goal 4: NEW GOAL: The pt will verbally identify and label numbers 1-10 with the numbers being out of logical order, 90% accuracy, mod cues:  - 90% (/10), mod cues; the pt tended to count in order 1-10 to determine the given number  GOAL MET    Goal 5:  The pt will complete basic confrontational and responsive naming with 75% accuracy, mod-max cues:  -  GOAL MET    Goal 6: The pt will say indivdual phrase and short sentences, in response to therapy tasks, with 70% accuracy, mod-max cues:  - GOAL MET    Goal 7: The pt will write single basic words with 50% accuracy, mod-max cues:  - DISCONTINUE THIS GOAL DUE TO LACK OF PROGRESS     Goal 8: NEW GOAL: The pt will say indivdual phrase and short sentences, in response to therapy tasks, with 90% accuracy, mod cues:  - GOAL MET     Goal 9: The pt will use an AAC speech generating device to answer given basic questions with 80% accuracy, mod-max cues:  - GOAL DISCONTINUED. The pt has not been using or bring his iPad and has not been wanting to use AAC communication due to improved verbal communication. NEW GOAL:  The pt will verbally identify and label numbers 11-20 with the numbers being out of logical order, 90% accuracy, mod cues: Other Treatments:    Assessment:   Overall progress continues to be noted. Plan:   Continued Speech Therapy services 2 x week for 6 weeks. Total Treatment Time / Charges     Time in Time out Total Time / units   Cognitive Tx         Speech Tx 1045 1130 45 min / 1 unit   Dysphagia Tx          Signature:     Palma Lofton MA, [address] [facility]#[id number]  Speech-Language Pathologist  Phone #: [phone]  Fax #: [phone]

## 2021-11-04 ENCOUNTER — HOSPITAL ENCOUNTER (OUTPATIENT)
Dept: SPEECH THERAPY | Age: 66
Setting detail: THERAPIES SERIES
Discharge: HOME | End: 2021-11-04
Payer: MEDICARE

## 2021-11-04 PROCEDURE — 92507 TX SP LANG VOICE COMM INDIV: CPT

## 2021-11-04 NOTE — PROGRESS NOTES
[facility], [facility],  [address], [address]   Phone: [phone]  Fax [phone]        Speech Therapy Progress Note         The following patient has been evaluated for therapy services. Please review the attached summary of the patient's plan of care, and verify that you agree with plan for additional therapy services at this time. Thank you for the referral of this patient. Please sign the attached certification form. Physician signature_______________________ Date________________      Fax to: [facility] [phone]         Date: 2021        Patient Name:  Geno Mejia    :  1955  MRN: [id number]  Restrictions/Precautions:  Limited communication ability due to significant Aphasia  Treatment Diagnosis:     Codes     Aphasia as late effect of cerebrovascular accident ([address]   Insurance/Certification information: Medicare A & B; [address]   Referring Physician:  Stephane Richards. Shelly Gaitan MD  Plan of care signed (Y/N):  Y  Visit# / total visits:     Pain level: No reported or suspected pain noted           Time Period for Report:  10/6/21 to 21   Cancels/No-shows to date:  None    Plan of Care/Treatment to date:  [x] Speech-Language Evaluation/Treatment    [] Dysphagia Evaluation/Treatment        [] Dysphagia Treatment via Neuromuscular Electrical Stimulation (NMES)   [] Modified Barium Swallowing Study  [] Fiberoptic Endoscopic Evaluation of Swallowing (FEES)    [] Cognitive-Linguistic Skills Development  [] Voice evaluation and Treatment      [] Evaluation, modification, and Training of Voice Prosthetic     [] Evaluation for Speech-Generating Augmentative and Alternative Communication Device   [] Therapeutic Services for the use of Speech-Generating Device. [] Other:     Significant Findings At Last Visit/Comments:    Subjective:  · The pt was in good spirits.  The pt reportedly had Botox injections in his right arm yesterday. He denied any pain.        Objective:   Observation: Continued improving length and complexity of spoken utterances. Assessment:   Summary: The pt continues to make slow but steady progress in therapy. His verbal expression continues to improve with more natural phrase and short-sentence productions noted. He still needs ongoing cues due to brocca's Aphasia. His ready accuracy continues to improve. He met another short-term goal with additional goal of increased complexity added.  Patient's response to treatment: Pleasant and cooperative. Progress towards goals:    Short-term Goals:  Goal 1: NEW GOAL: The pt will say indivdual sentences, in response to therapy tasks, with 90% accuracy, mod-max cues:  - 60% (6/10), mod-max cues   GOAL NOT MET     Goal 2: NEW GOAL: The pt will complete basic confrontational and responsive naming with 90% accuracy, min cues  - Basic Confrontational namin% (9/10), min cues  - Basic Responsive namin% (9/10), min cues  GOAL MET     Goal 3: NEW GOAL:The pt will demonstrate reading comprehension of single phrases/short sentences with 90% accuracy, mod cues  - 100%, mod cues  WILL D/C GOAL AS MET IF ABLE TO MAINTAIN ACCURACY     Goal 4: NEW GOAL: The pt will verbally identify and label numbers 11-20 with the numbers being out of logical order, 90% accuracy, mod cues:  - 50% (5/10), mod cues  GOAL NOT MET     Goal 5: The pt will complete basic confrontational and responsive naming with 75% accuracy, mod-max cues:  -  GOAL MET     Goal 6: The pt will say indivdual phrase and short sentences, in response to therapy tasks, with 70% accuracy, mod-max cues:  - GOAL MET     Goal 7:  The pt will write single basic words with 50% accuracy, mod-max cues:  - DISCONTINUE THIS GOAL DUE TO LACK OF PROGRESS      Goal 8: NEW GOAL: The pt will say indivdual phrase and short sentences, in response to therapy tasks, with 90% accuracy, mod cues:  - GOAL MET     Goal 9: The pt will use an AAC speech generating device to answer given basic questions with 80% accuracy, mod-max cues:  - GOAL DISCONTINUED. The pt has not been using or bring his iPad and has not been wanting to use AAC communication due to improved verbal communication.     Goal 10: NEW GOAL: The pt will verbally identify and label numbers 1-10 with the numbers being out of logical order, 90% accuracy, mod cues:  - 90% (/10), mod cues; the pt tended to count in order 1-10 to determine the given number  GOAL MET        NEW GOAL:   The pt will complete moderately confrontational and responsive naming with 90% accuracy, min-mod cues        Current Frequency/Duration:  # Days per week: [] 1 day # Weeks: [] 1 week [x] 4 weeks      [x] 2 days   [] 2 weeks [] 5 weeks      [] 3 days   [] 3 weeks [] 6 weeks     Rehab Potential: [] Excellent [x] Good [] Fair  [] Poor     Goal Status:  [] Achieved [x] Partially Achieved  [] Not Achieved     Patient Status: [] Continue per initial plan of Care     [] Patient now discharged     [x] Additional visits requested, Please re-certify for additional visits:      Requested frequency/duration: 2 X/week for 4 weeks    Electronically signed by: Radha Vela MA, [address] [facility] [place][id number]  Speech-Language Pathologist    Phone: [phone]  Fax: [phone]    If you have any questions or concerns, please don't hesitate to call.   Thank you for your referral.

## 2021-11-04 NOTE — FLOWSHEET NOTE
[facility] Therapy, [facility], [address], [address] [place]   Phone: [phone]  Fax [phone]      Speech-Language Pathology  Daily Treatment Note    Date:  2021    Patient Name:  Mena Sparks    :  1955  MRN: [id number]  Restrictions/Precautions:  Limited communication ability due to significant Aphasia  Treatment Diagnosis:    Codes     Aphasia as late effect of cerebrovascular accident (CVA)    -  Primary   Insurance/Certification information: Medicare A & B; [address]   Referring Physician:  Erika Wilde. Jeanette Kaba MD  Plan of care signed (Y/N):  Y  Visit# / total visits:     Pain level: no c/o pain    Progress Note: [x]  Yes  []  No  Next due by: Visit #10:     Subjective: The pt was in good spirits. The pt reportedly had Botox injections in his right arm yesterday. He denied any pain. Objective:   Short-term Goals:  Goal 1: NEW GOAL: The pt will say indivdual sentences, in response to therapy tasks, with 90% accuracy, mod-max cues:  - 60% (6/10), mod-max cues and extra time; targeted by basic picture descriptions    Goal 2: NEW GOAL: The pt will complete basic confrontational and responsive naming with 90% accuracy, min cues  - Basic Confrontational namin% (9/10), min cues  - Basic Responsive namin% (9/10), min cues  GOAL MET    Goal 3: NEW GOAL:The pt will demonstrate reading comprehension of single phrases/short sentences with 90% accuracy, mod cues  - 100% (6/6), mod cues; targeted by having him read a single phrase and then answer basic yes/no questions about what he read. WILL D/C GOAL AS MET IF ABLE TO MAINTAIN ACCURACY    Goal 4: NEW GOAL: The pt will verbally identify and label numbers 11-20 with the numbers being out of logical order, 90% accuracy, mod cues:  - 50% (/10), mod cues    Goal 5:  The pt will complete basic confrontational and responsive naming with 75% accuracy, mod-max cues:  -  GOAL MET    Goal 6: The pt will say indivdual phrase and short sentences, in response to therapy tasks, with 70% accuracy, mod-max cues:  - GOAL MET    Goal 7: The pt will write single basic words with 50% accuracy, mod-max cues:  - DISCONTINUE THIS GOAL DUE TO LACK OF PROGRESS     Goal 8: NEW GOAL: The pt will say indivdual phrase and short sentences, in response to therapy tasks, with 90% accuracy, mod cues:  - GOAL MET     Goal 9: The pt will use an AAC speech generating device to answer given basic questions with 80% accuracy, mod-max cues:  - GOAL DISCONTINUED. The pt has not been using or bring his iPad and has not been wanting to use AAC communication due to improved verbal communication. Goal 10: NEW GOAL: The pt will verbally identify and label numbers 1-10 with the numbers being out of logical order, 90% accuracy, mod cues:  - 90% (/10), mod cues; the pt tended to count in order 1-10 to determine the given number  GOAL MET      NEW GOAL:   The pt will complete moderately confrontational and responsive naming with 90% accuracy, min-mod cues    Other Treatments:    Assessment:   Overall progress continues to be noted. Plan:   Continued Speech Therapy services 2 x week for 6 weeks. Total Treatment Time / Charges     Time in Time out Total Time / units   Cognitive Tx         Speech Tx 1045 1130 45 min / 1 unit   Dysphagia Tx          Signature:     Elise Urias MA, [address] [facility]#[id number]  Speech-Language Pathologist  Phone #: [phone]  Fax #: [phone]

## 2021-11-09 ENCOUNTER — HOSPITAL ENCOUNTER (OUTPATIENT)
Dept: SPEECH THERAPY | Age: 66
Setting detail: THERAPIES SERIES
Discharge: HOME | End: 2021-11-09
Payer: MEDICARE

## 2021-11-09 PROCEDURE — 92507 TX SP LANG VOICE COMM INDIV: CPT

## 2021-11-09 NOTE — FLOWSHEET NOTE
[facility] Therapy, [facility],  [address], [address]   Phone: [phone]  Fax [phone]      Speech-Language Pathology  Daily Treatment Note    Date:  2021    Patient Name:  Kennard Olszewski    :  1955  MRN: [id number]  Restrictions/Precautions:  Limited communication ability due to significant Aphasia  Treatment Diagnosis:    Codes     Aphasia as late effect of cerebrovascular accident (CVA)    -  Primary   Insurance/Certification information: Medicare A & B; [address]   Referring Physician:  Gema Prasad. Irish Villa MD  Plan of care signed (Y/N):  Y  Visit# / total visits:     Pain level: no c/o pain    Progress Note: []  Yes  [x]  No  Next due by: Visit #10: 1/10 or 21     Subjective: The pt was in overall good spirits. He continues to voice frustration with his expressive Aphasia and inability to fully express himself. The possibility of joining an Aphasia support group was again mentioned. The pt expressed interest in joining the  CHAT group. Will reach out to his POA re: this matter. Objective:   Short-term Goals:  Goal 1: NEW GOAL: The pt will say indivdual sentences, in response to therapy tasks, with 90% accuracy, mod-max cues:  - Goal targeted indirectly through other treatment tasks. Goal 2: NEW GOAL:   The pt will complete moderately confrontational and responsive naming with 90% accuracy, min-mod cues:  - Confrontational: 40% (4/10), min-mod cues  - Responsive: 50% (5/10), min-mod cues    Goal 3: NEW GOAL:The pt will demonstrate reading comprehension of single phrases/short sentences with 90% accuracy, mod cues  - Goal targeted indirectly through other treatment tasks. Goal 4: NEW GOAL: The pt will verbally identify and label numbers 11-20 with the numbers being out of logical order, 90% accuracy, mod cues:  - 50% (5/10), mod cues    Goal 5:  The pt will complete basic confrontational and responsive naming with 75% accuracy, mod-max cues:  -  GOAL MET    Goal 6: The pt will say indivdual phrase and short sentences, in response to therapy tasks, with 70% accuracy, mod-max cues:  - GOAL MET    Goal 7: The pt will write single basic words with 50% accuracy, mod-max cues:  - DISCONTINUE THIS GOAL DUE TO LACK OF PROGRESS     Goal 8: NEW GOAL: The pt will say indivdual phrase and short sentences, in response to therapy tasks, with 90% accuracy, mod cues:  - GOAL MET     Goal 9: The pt will use an AAC speech generating device to answer given basic questions with 80% accuracy, mod-max cues:  - GOAL DISCONTINUED. The pt has not been using or bring his iPad and has not been wanting to use AAC communication due to improved verbal communication. Goal 10: NEW GOAL: The pt will verbally identify and label numbers 1-10 with the numbers being out of logical order, 90% accuracy, mod cues:  - 90% (/10), mod cues; the pt tended to count in order 1-10 to determine the given number  GOAL MET    Goal 11: NEW GOAL: The pt will complete basic confrontational and responsive naming with 90% accuracy, min cues  - GOAL MET      Other Treatments:    Assessment:   Overall progress continues to be noted. Plan:   Continued Speech Therapy services 2 x week for 6 weeks. Total Treatment Time / Charges     Time in Time out Total Time / units   Cognitive Tx         Speech Tx 1050 1135 45 min / 1 unit   Dysphagia Tx          Signature:     Brett Delgado MA, [address] [id number]  Speech-Language Pathologist  Phone #: [phone]  Fax #: [phone]

## 2021-11-11 ENCOUNTER — HOSPITAL ENCOUNTER (OUTPATIENT)
Dept: SPEECH THERAPY | Age: 66
Setting detail: THERAPIES SERIES
Discharge: HOME | End: 2021-11-11
Payer: MEDICARE

## 2021-11-11 PROCEDURE — 92507 TX SP LANG VOICE COMM INDIV: CPT

## 2021-11-11 NOTE — FLOWSHEET NOTE
[facility], [facility], [address], [address]  Phone: [phone]  Fax [phone]      Speech-Language Pathology  Daily Treatment Note    Date:  2021    Patient Name:  Yasmin Garza    :  1955  MRN: [id number]  Restrictions/Precautions:  Limited communication ability due to significant Aphasia  Treatment Diagnosis:    Codes     Aphasia as late effect of cerebrovascular accident (CVA)    -  Primary   Insurance/Certification information: Medicare A & B; [place]   Referring Physician:  Jennyfer Ruiz. Piedad Pringle MD  Plan of care signed (Y/N):  Y  Visit# / total visits:     Pain level: no c/o pain    Progress Note: []  Yes  [x]  No  Next due by: Visit #10: 2/10 or 21     Subjective: The pt was his typically pleasant self. Objective:   Short-term Goals:  Goal 1: NEW GOAL: The pt will say indivdual sentences, in response to therapy tasks, with 90% accuracy, mod-max cues:  - 70% (7/10), mod-max cues and extra time; targeted via picture descriptions    Goal 2: NEW GOAL:   The pt will complete moderately confrontational and responsive naming with 90% accuracy, min-mod cues:  - Confrontational: 60% (6/10), min-mod cues  - Responsive: 50% (5/10), min-mod cues    Goal 3: NEW GOAL:The pt will demonstrate reading comprehension of single phrases/short sentences with 90% accuracy, mod cues  - 60%, mod cues; targeted by having him read a phrase and then answer yes/no questions about what he read    Goal 4: NEW GOAL: The pt will verbally identify and label numbers 11-20 with the numbers being out of logical order, 90% accuracy, mod cues:  - 40% (4/10), mod cues    Goal 5: The pt will complete basic confrontational and responsive naming with 75% accuracy, mod-max cues:  -  GOAL MET    Goal 6: The pt will say indivdual phrase and short sentences, in response to therapy tasks, with 70% accuracy, mod-max cues:  - GOAL MET    Goal 7:  The pt will write single basic words with 50% accuracy, mod-max cues:  - DISCONTINUE THIS GOAL DUE TO LACK OF PROGRESS     Goal 8: NEW GOAL: The pt will say indivdual phrase and short sentences, in response to therapy tasks, with 90% accuracy, mod cues:  - GOAL MET     Goal 9: The pt will use an AAC speech generating device to answer given basic questions with 80% accuracy, mod-max cues:  - GOAL DISCONTINUED. The pt has not been using or bring his iPad and has not been wanting to use AAC communication due to improved verbal communication. Goal 10: NEW GOAL: The pt will verbally identify and label numbers 1-10 with the numbers being out of logical order, 90% accuracy, mod cues:  - 90% (/10), mod cues; the pt tended to count in order 1-10 to determine the given number  GOAL MET    Goal 11: NEW GOAL: The pt will complete basic confrontational and responsive naming with 90% accuracy, min cues  - GOAL MET      Other Treatments:    Assessment:   Overall progress continues to be noted. Plan:   Continued Speech Therapy services 2 x week for 6 weeks. Total Treatment Time / Charges     Time in Time out Total Time / units   Cognitive Tx         Speech Tx 1026 1111 45 min / 1 unit   Dysphagia Tx          Signature:     Unknown JARED Orozco, [address] [facility]  GP#[id number]  Speech-Language Pathologist  Phone #: [phone]  Fax #: [phone]

## 2021-11-16 ENCOUNTER — HOSPITAL ENCOUNTER (OUTPATIENT)
Dept: SPEECH THERAPY | Age: 66
Setting detail: THERAPIES SERIES
Discharge: HOME | End: 2021-11-16
Payer: MEDICARE

## 2021-11-16 ENCOUNTER — HOSPITAL ENCOUNTER (OUTPATIENT)
Dept: OCCUPATIONAL THERAPY | Age: 66
Setting detail: THERAPIES SERIES
Discharge: HOME | End: 2021-11-16
Payer: MEDICARE

## 2021-11-16 PROCEDURE — 97166 OT EVAL MOD COMPLEX 45 MIN: CPT

## 2021-11-16 PROCEDURE — 97140 MANUAL THERAPY 1/> REGIONS: CPT

## 2021-11-16 PROCEDURE — 92507 TX SP LANG VOICE COMM INDIV: CPT

## 2021-11-16 PROCEDURE — 97112 NEUROMUSCULAR REEDUCATION: CPT

## 2021-11-16 NOTE — FLOWSHEET NOTE
[facility], [address]   Phone: [phone]  Fax [phone]      Outpatient Occupational Therapy     [x] Daily Treatment Note   [] Progress Note   [] Discharge Note    Date:  11/16/2021    Patient Name:  Toby Goldberg         YOB: 1955    Medical Diagnosis/ICD 10: Unspecified sequelae of unspecified cerebrovascular disease (I69.90)     Treatment Diagnosis:  Right hemiplegia     Onset Date:  November 2019     Referral Date:  11/15/2021     Referring Physician:    Quan Dolan MD                                                            Insurance information: Medicare A & B; [employer]      Precautions/ Contraindications:  (pt denies changes to PMHx and intake forms since last seen by OT in July 2021)  Red Flag Symptoms: none  Safety awareness: intact  Other: aphasia    Adhesive allergy: NO  Latex Allergy:  [x]? NO      []? YES     Plan of care sent to provider:    [x]Faxed (date: 11/16/2021  ) []Co-signature    (attempts: 1[] 2 []3[])        Plan of care signed:    []Yes (date:  )           [x]No       Progress Note covers period from (if applicable):    [x]NA    []From          To           Next Progress Note due:   12/14/2021    Visit# / total visits:  1/8    Functional Outcome Measure:   11/16/2021: STREAM: 4/20    Subjective: Patient stated complaint: Decreased function of right arm     Pain level: denies    Plan for Next Session:  NMES  WBing  Arm mata krueger      Objective:       Assessment of UE tone/spasticity: RIGHT UE       7/27/2021  (OT d/c) 11/16/2021         Shoulder flexors 2 1 to 1+         Internal rotators 2 1+         External rotators 1 0         Elbow flexors 1 1         Elbow extensor 1 0         Supinators 0 0         Pronators 1+ 1         Wrist flexors 2 1         Wrist extensors 0 0         Digit flexors 3 1 to 1+         Digit extensors 0 0         Comments: mild fluctuation noted in flexor tone when tested sitting v. Supine     Range of Motion     AROM             LEFT   RIGHT       11/16/2021 11/16/2021     Shoulder:   flex WNL   No active flex  Passive flexion to 85o                        ABD WNL   ~40o     Elbow:      ext/flex WNL   Demo ~15-20o active ext against gravity     Forearm:  sup/pron WNL   Demo ~15o of active supination (<neutral)     Wrist:       ext/flex WNL   No active movement noted     Digits: WNL   Minimal active flexion noted     Comments:    PROM of Right shoulder flex and abduction limited to <90o. PROM Right elbow/wrist/hand WFL with increased time d/t flexor tone . Demo good active scap squeeze. Trace shoulder shrug on right.     Attempts at RUE active movement result in right scap retraction, shld extension with compensation at upper trap, elbow flexion, and pronation.          Exercises:    Exercises in bold performed in department today. Items not bolded are carried forward from prior visits for continuity of the record.   Exercise/Equipment Resistance/Repetitions HEP Other comments      ADL/IADL TRAINING         []        []        []        []       NEUROMUSCULAR RE-EDU and THERAPEUTIC ACTIVITY        WBing 10 min  Seated EOM []      AAROM Seated  scap squeeze, 5x, 5 sec hold  shld shrug, 5x with visual and tactile cue    Supine  scap protractions, 3x  Elbow ext, 5x  Elbow flex, 5x  Shld flex, 3x []        []         THERAPEUTIC EXERCISE and MANUAL TECHNIQUES        PROM Supine  Shld flex (85-90o   soft end feel)  Shld abduction  (85-90o  soft end feel)  IR/ER  Elbow ext  Wrist ext  Digit ext []      Scap mobs EOM  All directions []        []       MODALITIES         []        []       SPLINTING         []      Home Exercise Program: TBD    Treatment/Activity Tolerance:    Patients response to treatment:   [x]Patient tolerated treatment well []Patient limited by fatigue   []Patient limited by pain  []Patient limited by other medical complications   []Other:     Assessment:   Pt is a 77year old male, referred to outpatient occupational therapy with diagnosis of post CVA and right hemiplegia. Pt s/p neuro-toxin injections to RUE to decrease flexor tone and allow opportunity for strengthening of extensors. Pt will benefit from outpatient OT to maximize safety and independence with daily living tasks. Recommend outpatient OT 2x/week for 4weeks. GOALS: Goals established 11/16/21   Patient stated goal: \"Improve right arm\"  []? Progressing: []? Met: []? Not Met: []? Adjusted     Therapist goals for Patient  Short Term Goals:  to be met in 2 weeks (by 11/30/2021)     Pt will demonstrate 15 degrees of active elbow flexion against gravity, for improved functional task performance. []? Progressing: []? Met: []? Not Met: []? Adjusted     Pt will demonstrate 10 degrees of active wrist extension for improved functional task performance. []? Progressing: []? Met: []? Not Met: []? Adjusted     Long Term Goals:  to be met in 4 weeks (by 12/14/2021)     Pt will demonstrate active right shoulder flexion to 30 degrees against gravity for improved functional task performance.     []? Progressing: []? Met: []? Not Met: []? Adjusted     Pt will demonstrate 25 degrees of right active elbow flexion against gravity for improved functional task performance.   []? Progressing: []? Met: []? Not Met: []? Adjusted     Pt will demonstrate 20 degrees of right active elbow extension for improved functional task performance.    []? Progressing: []? Met: []? Not Met: []? Adjusted     Pt will demo ability to use on right hand as gross assist during bilateral BADL tasks. []? Progressing: []? Met: []? Not Met: []?  Adjusted     Prognosis: [x]Good   [x]Fair   []Poor    Patient Requires Follow-up:  [x]Yes  []No    Plan: [x]Plan of care initiated     [x]Continue per plan of care    [] Alter current plan (see comments)    []Hold pending MD visit []Discharge      --- ---- --- ---- --- ---- --- ---- --- ---- --- ---- --- ---- --- ---- --- ---- --- --- ---    Therapeutic Exercise/Home Exercise Program:   0 minutes    Therapeutic Activity:  0 minutes    ADL Trainin minutes      Neuromuscular Re-Education: 30 minutes      Manual Therapy:  15 minutes    Splintin minutes     Modalities:  0 minutes    Time in: 6411 Time out:  1417    Timed Code Treatment Minutes:  17 min eval + 45 min treatment = 62 min total    Total Treatment Minutes:  45 min    Electronically signed by:  Kenyatta Pruett OT, OTR/L

## 2021-11-16 NOTE — FLOWSHEET NOTE
[facility], [facility], [address], [address]   Phone: [phone]  Fax [phone]      Speech-Language Pathology  Daily Treatment Note    Date:  2021    Patient Name:  Liz Smith    :  1955  MRN: [id number]  Restrictions/Precautions:  Limited communication ability due to significant Aphasia  Treatment Diagnosis:    Codes     Aphasia as late effect of cerebrovascular accident (CVA)    -  Primary   Insurance/Certification information: Medicare A & B; [address]   Referring Physician:  Pb Montiel. Mayuri Menjivar MD  Plan of care signed (Y/N):  Y  Visit# / total visits:     Pain level: no c/o pain    Progress Note: []  Yes  [x]  No  Next due by: Visit #10: 3/10 or 21     Subjective: The pt was in good spirits. Objective:   Short-term Goals:  Goal 1: NEW GOAL: The pt will say indivdual sentences, in response to therapy tasks, with 90% accuracy, mod-max cues:  - 80% (8/10), mod-max cues and extra time; targeted via picture descriptions    Goal 2: NEW GOAL:   The pt will complete moderately confrontational and responsive naming with 90% accuracy, min-mod cues:  - Confrontational: 60% (6/10), min-mod cues  - Responsive: 70% (7/10), min-mod cues    Goal 3: NEW GOAL:The pt will demonstrate reading comprehension of single phrases/short sentences with 90% accuracy, mod cues  - Goal targeted indirectly through other treatment tasks. Goal 4: NEW GOAL: The pt will verbally identify and label numbers 11-20 with the numbers being out of logical order, 90% accuracy, mod cues:  - 80% (8/10), mod cues    Goal 5: The pt will complete basic confrontational and responsive naming with 75% accuracy, mod-max cues:  -  GOAL MET    Goal 6: The pt will say indivdual phrase and short sentences, in response to therapy tasks, with 70% accuracy, mod-max cues:  - GOAL MET    Goal 7:  The pt will write single basic words with 50% accuracy, mod-max cues:  - DISCONTINUE THIS GOAL DUE TO LACK OF PROGRESS     Goal 8: NEW GOAL: The pt will say indivdual phrase and short sentences, in response to therapy tasks, with 90% accuracy, mod cues:  - GOAL MET     Goal 9: The pt will use an AAC speech generating device to answer given basic questions with 80% accuracy, mod-max cues:  - GOAL DISCONTINUED. The pt has not been using or bring his iPad and has not been wanting to use AAC communication due to improved verbal communication. Goal 10: NEW GOAL: The pt will verbally identify and label numbers 1-10 with the numbers being out of logical order, 90% accuracy, mod cues:  - 90% (/10), mod cues; the pt tended to count in order 1-10 to determine the given number  GOAL MET    Goal 11: NEW GOAL: The pt will complete basic confrontational and responsive naming with 90% accuracy, min cues  - GOAL MET      Other Treatments:    Assessment:   Improved ability to say number 11-20 this date given in random order. Plan:   Continued Speech Therapy services 2 x week for 6 weeks. Total Treatment Time / Charges     Time in Time out Total Time / units   Cognitive Tx         Speech Tx 1228 1312 44 min / 1 unit   Dysphagia Tx          Signature:     Rico Saravia MA, [place]  RQ#[id number]  Speech-Language Pathologist  Phone #: [phone]  Fax #: [phone]

## 2021-11-16 NOTE — PROGRESS NOTES
[facility], [facility], [address], [address]  Phone: [phone]  Fax [phone]     Occupational Therapy Certification    Dear Gerry Bass. Kalen Cano MD    We had the pleasure of evaluating the following patient for occupational therapy services at [facility]. A summary of our findings can be found in the initial assessment below. This includes our plan of care. If you have any questions or concerns regarding these findings, please do not hesitate to contact me at the office phone number above. Thank you for the referral.       Provider Signature:_______________________________Date:__________________  By signing above (or electronic signature), therapist's plan is approved by physician      Patient: Danuta Hill   : 1955   MRN: [id number]       Evaluation Date: 2021        Medical Diagnosis/ICD 10: Unspecified sequelae of unspecified cerebrovascular disease (I69.90)    Treatment Diagnosis:  Right hemiplegia    Onset Date:  2019    Referral Date:  11/15/2021    Referring Physician:    Gerry Bass.  Kalen Cano MD                                                           Insurance information: Medicare A & B; AARP     Precautions/ Contraindications:  (pt denies changes to PMHx and intake forms since last seen by OT in 2021)  Red Flag Symptoms: none  Safety awareness: intact  Other: aphasia    Adhesive allergy: NO  Latex Allergy:  [x]NO      []YES     Preferred Language for Healthcare:   [x]English       []other:    C-SSRS Triggered by Intake questionnaire (Past 2 wk assessment):   [x] No, Questionnaire did not trigger screening.   [] Yes, Patient intake triggered further evaluation      [] C-SSRS Screening completed  [] PCP notified via Plan of Care  [] Emergency services notified    Plan of care sent to provider:      [x]Faxed 21  []Co-signature (attempts: 1[x] 2 []3[])         Relevant Medical History:  Per OT eval 3/25/2021: \"Pt reports that he had a stroke ov Nov 23, 2019. Pt was then in the hospital and then transferred to [facility]. From [facility], he then went to [facility] in [place]. Was in rehab there for a while and then got stuck there because of COVID. Pt then returned to [facility] here in [facility] in May. \"  Pt participated in OP OT at [place] [address] from March 2021 through July 2021. Pt demo limited progress toward OT goals. Barriers to progress include spasticity in RUE. Tone reduction techniques of PROM, NMES and WBing resulted in only temporary effects. Pt demo increased flexor tone in RUE with attempts at active movement. OT recommended pt consult Dr. Yoselyn Gregg for tone management options. Pt returns to OP OT this date after receiving neuro-toxin injections to RUE approx 14 days ago. Functional Outcome Measure:   11/16/2021: STREAM: 4/20     Patient goal for therapy:  \"Improve right arm\"          SUBJECTIVE     Patient stated complaint: Decreased function of right arm    Pain Scale: 0/10    OCCUPATIONAL PROFILE    Occupational History (Life Experiences Including Prior Level of Function):   Pt independent with BADLs and IADLs prior to November 2019      Physical and Social Environments (which aid or inhibit performance in desired occupations):  Current BADL status  Eating: Independent  Grooming: Independent  Bathing:   Independent   Dressing:   Independent   Toileting:   Independent    Current IADL status   Home tasks:  Light tasks only. Staff at [facility] perform medication management, cleaning, laundry. Brother assist with money management. Work/School:  Pt has not worked since [place] tasks:  Gets out in community with brother and sister-in-law. Attends medical and therapy appointments.    Driving:   Has not driven since CV    Personal Interests and Values:  History       Home Environment - Lives with alone in assisted living apartment and [employer]  Family/caregiver support:    YES                              Home environment:              apartment   Steps to enter first floor:      No steps            Steps to second floor:          N/A  Bathroom:                              Walk-in Shower, Grab bars and built-in shower chair  Bedroom:                               Sleeps in regular bed  Equipment owned:                small base quad cane (SBQC), manual W/C and life alert       (Home on [place]: [address], 2 levels plus basement. Laundry in basement. Main floor bed and bath. Tub/shower.)       OBJECTIVE     Hand dominance: right handed prior to CVA  Edema: none noted    Range of Motion    AROM        LEFT  RIGHT      11/16/2021 11/16/2021    Shoulder:   flex WNL  No active flex  Passive flexion to 85o                       ABD WNL  ~40o    Elbow:      ext/flex WNL  Demo ~15-20o active ext against gravity    Forearm:  sup/pron WNL  Demo ~15o of active supination (<neutral)    Wrist:       ext/flex WNL  No active movement noted    Digits: WNL  Minimal active flexion noted    Comments:    PROM of Right shoulder flex and abduction limited to <90o. PROM Right elbow/wrist/hand WFL with increased time d/t flexor tone . Demo good active scap squeeze. Trace shoulder shrug on right. Attempts at RUE active movement result in right scap retraction, shld extension with compensation at upper trap, elbow flexion, and pronation.        Strength  Muscle  (*denotes pain) Left  Right     11/16/2021 11/16/2021    Shoulder Flexion  5  1    Shoulder Abduction  5  2    Elbow Flexion 5  1    Elbow Extension 5  2 to 2+    Pronation 5  0    Supination 5  1    Wrist Flexion 5  0    Wrist Extension 5  0          Assessment of UE tone/spasticity: RIGHT UE     7/27/2021  (OT d/c) 11/16/2021      Shoulder flexors 2 1 to 1+      Internal rotators 2 1+      External rotators 1 0      Elbow flexors 1 1      Elbow extensor 1 0      Supinators 0 0      Pronators 1+ 1      Wrist flexors 2 1      Wrist extensors 0 0      Digit flexors 3 1 to 1+      Digit extensors 0 0      Comments: mild fluctuation noted in flexor tone when tested sitting v. Supine    Modified Rian Scale  0    No increase in muscle tone  1    Slight increase in muscle tone, manifested by a catch and release or by minimal        resistance at the end of the range of motion when the affected part(s) is moved in        flexion or extension  1+ Slight increase in muscle tone, manifested by a catch, followed by minimal        resistance throughout the remainder (less than half) of the ROM  2    More marked increase in muscle tone through most of the ROM, but        affected part(s) easily moved  3    Considerable increase in muscle tone, passive movement difficult  4    Affected part(s) rigid in flexion or extension    Splinting Needs: will cont to assess        Functional Mobility/Transfers: mod ind with SBQC    Sensation:  Pt denies N/T in BUE. Light touch intact in BUE. Proprioception:  Will cont to assess    Visual Assessment:     Wears glasses: for reading       Visual Tracking: [employer] for tasks completed   Visual fields: NT      Functional Cognition:   Follows 1 step commands appropriately. Able to sequence BADLs independently. Communication skills: Significantly impaired by aphasia - expression and receptive. Hearing:  [facility]      [x] Patient history, allergies, meds reviewed. Medical chart reviewed. See intake form. Review Of Systems (ROS):  [x]Performed Review of systems (Integumentary, Cardiopulmonary, Neurological) by intake and observation. Intake form has been scanned into medical record. Patient has been instructed to contact their primary care physician regarding ROS issues if not already being addressed at this time.       Co-morbidities/Complexities (which will affect course of rehabilitation):   []None        []Hx of COVID   Arthritic conditions   []Rheumatoid arthritis (M05.9)  []Osteoarthritis (M19.91)  []Gout   Cardiovascular conditions   [x]Hypertension (I10)  [x]Hyperlipidemia (E78.5)  []Angina pectoris (I20)  []Atherosclerosis (I70)  []Pacemaker  []Hx of CABG/stent/  cardiac surgeries Musculoskeletal conditions   []Disc pathology   []Congenital spine pathologies   []Osteoporosis (M81.8)  []Osteopenia (M85.8)  []Scoliosis   Endocrine conditions   []Hypothyroid (E03.9)  []Hyperthyroid Gastrointestinal conditions   []Constipation (P73.95)   Metabolic conditions   []Morbid obesity (E66.01)  []Diabetes type 1(E10.65) or 2 (E11.65)   []Neuropathy (G60.9)   Cardio/Pulmonary conditions   []Asthma (J45)  []Coughing   []COPD (J44.9)  []CHF  []A-fib Psychological Disorders  []Anxiety (F41.9)  []Depression (F32.9)   []Other:   Developmental Disorders  []Autism (F84.0)  []CP (G80)  []Down Syndrome (Q90.9)  []Developmental delay     Neurological conditions  [x]Prior Stroke (I69.30)  []Parkinson's (G20)  []Encephalopathy (G93.40)  []MS (G35)  []Post-polio (G14)  []SCI  []TBI  []ALS Other conditions  []Fibromyalgia (M79.7)  []Vertigo  []Syncope  []Kidney Failure  []Cancer      []currently undergoing                treatment  []Pregnancy  []Incontinence Prior surgeries  []involved limb  []previous spinal surgery  [] section birth  []hysterectomy  []bowel / bladder surgery  []other relevant surgeries   Visual Conditions  []Macular degeneration  []Glaucoma  []Diabetic retinopathy  []Legally blind [x]Other: History of seizures           Barriers to/and or personal factors that will affect rehab potential:              Cognitive function, Education/Learning barriers and Other:severity of deficits    Falls Risk Assessment (30 days):   [x] Falls Risk assessed and no intervention required.   [] Falls risk assessed (via clinical reasoning) and patient requires intervention due to being higher risk for falls  [] Tug Score (>12 s at risk):  [] Falls education provided, including functional mobility training  Neuromuscular re-education: facilitation of normal movement patterns, visual-perceptual and cognitive training to restore limitations caused by neurological insult. Therapeutic Activity: functional task participation that incorporates multiple parameters and treatment interventions. Patient/caregiver education on joint protection, postural re-education, activity modification, progression of HEP. Manual therapy including PROM, soft tissue mobilization, manual lymph drainage, and joint manipulations  Splinting including custom or pre-fabricated  Modalities as needed that may include: Electrical Stimulation/NMES  Hot/Cold Packs  Kinesiotape  Leukotape      GOALS: Goals established 11/16/21   Patient stated goal: \"Improve right arm\"  [] Progressing: [] Met: [] Not Met: [] Adjusted    Therapist goals for Patient  Short Term Goals:  to be met in 2 weeks (by 11/30/2021)     Pt will demonstrate 15 degrees of active elbow flexion against gravity, for improved functional task performance. [] Progressing: [] Met: [] Not Met: [] Adjusted    Pt will demonstrate 10 degrees of active wrist extension for improved functional task performance. [] Progressing: [] Met: [] Not Met: [] Adjusted    Long Term Goals:  to be met in 4 weeks (by 12/14/2021)    Pt will demonstrate active right shoulder flexion to 30 degrees against gravity for improved functional task performance.     [] Progressing: [] Met: [] Not Met: [] Adjusted    Pt will demonstrate 25 degrees of right active elbow flexion against gravity for improved functional task performance.   [] Progressing: [] Met: [] Not Met: [] Adjusted    Pt will demonstrate 20 degrees of right active elbow extension for improved functional task performance.    [] Progressing: [] Met: [] Not Met: [] Adjusted    Pt will demo ability to use on right hand as gross assist during bilateral BADL tasks.    [] Progressing: [] Met: [] Not Met: [] Adjusted      Electronically signed by:  Rhonda Monroy OT, OTR/L

## 2021-11-18 ENCOUNTER — HOSPITAL ENCOUNTER (OUTPATIENT)
Dept: OCCUPATIONAL THERAPY | Age: 66
Setting detail: THERAPIES SERIES
Discharge: HOME | End: 2021-11-18
Payer: MEDICARE

## 2021-11-18 ENCOUNTER — HOSPITAL ENCOUNTER (OUTPATIENT)
Dept: SPEECH THERAPY | Age: 66
Setting detail: THERAPIES SERIES
Discharge: HOME | End: 2021-11-18
Payer: MEDICARE

## 2021-11-18 PROCEDURE — 92507 TX SP LANG VOICE COMM INDIV: CPT

## 2021-11-18 PROCEDURE — 97112 NEUROMUSCULAR REEDUCATION: CPT

## 2021-11-18 NOTE — FLOWSHEET NOTE
[facility]  [facility]. and Therapy, [facility],  [address], [address]   Phone: [phone]  Fax [phone]      Speech-Language Pathology  Daily Treatment Note    Date:  2021    Patient Name:  Suzi Rojas    :  1955  MRN: [id number]  Restrictions/Precautions:  Limited communication ability due to significant Aphasia  Treatment Diagnosis:    Codes     Aphasia as late effect of cerebrovascular accident (CVA)    -  Primary   Insurance/Certification information: Medicare A & B; [address]   Referring Physician:  Jennifer Marte. Anai Baird MD  Plan of care signed (Y/N):  Y  Visit# / total visits:     Pain level: no c/o pain    Progress Note: []  Yes  [x]  No  Next due by: Visit #10: 4/10 or 21     Subjective: The pt was pleasant and in good spirits. Objective:   Short-term Goals:  Goal 1: NEW GOAL: The pt will say indivdual sentences, in response to therapy tasks, with 90% accuracy, mod-max cues:  - 60% (6/10), mod-max cues and extra time; targeted via picture descriptions and open ended questions    Goal 2: NEW GOAL:   The pt will complete moderately confrontational and responsive naming with 90% accuracy, min-mod cues:  - Confrontational: Did not target   - Responsive: 70% (7/10), min-mod cues    Goal 3: NEW GOAL:The pt will demonstrate reading comprehension of single phrases/short sentences with 90% accuracy, mod cues  - 40%, mod cues; targeted by having him read a phrase and then answer a yes/no question about the phrase given printed yes/no cards; The pt's performance toward his goal continues to greatly vary; He struggle with this task today but once the pt read the given phrase with the therapist he was able to read aloud many of the words with only minimal cueing.     Goal 4: NEW GOAL: The pt will verbally identify and label numbers 11-20 with the numbers being out of logical order, 90% accuracy, mod cues:  - 80% (8/10), mod cues    Goal 5: The pt will complete basic confrontational and responsive naming with 75% accuracy, mod-max cues:  -  GOAL MET    Goal 6: The pt will say indivdual phrase and short sentences, in response to therapy tasks, with 70% accuracy, mod-max cues:  - GOAL MET    Goal 7: The pt will write single basic words with 50% accuracy, mod-max cues:  - DISCONTINUE THIS GOAL DUE TO LACK OF PROGRESS     Goal 8: NEW GOAL: The pt will say indivdual phrase and short sentences, in response to therapy tasks, with 90% accuracy, mod cues:  - GOAL MET     Goal 9: The pt will use an AAC speech generating device to answer given basic questions with 80% accuracy, mod-max cues:  - GOAL DISCONTINUED. The pt has not been using or bring his iPad and has not been wanting to use AAC communication due to improved verbal communication. Goal 10: NEW GOAL: The pt will verbally identify and label numbers 1-10 with the numbers being out of logical order, 90% accuracy, mod cues:  - 90% (/10), mod cues; the pt tended to count in order 1-10 to determine the given number  GOAL MET    Goal 11: NEW GOAL: The pt will complete basic confrontational and responsive naming with 90% accuracy, min cues  - GOAL MET      Other Treatments:    Assessment:   Continued improved ability to label numbers 11-20 with cues. He struggled more with reading comprehension of phrases today. Plan:   Continued Speech Therapy services 2 x week for 6 weeks. Total Treatment Time / Charges     Time in Time out Total Time / units   Cognitive Tx         Speech Tx 1045 1130 45 min / 1 unit   Dysphagia Tx          Signature:     Jackie Gates MA, [address] [id number]  Speech-Language Pathologist  Phone #: [phone]  Fax #: [phone]

## 2021-11-18 NOTE — FLOWSHEET NOTE
[facility]  [facility]. and Therapy, [facility] [address], [address]   Phone: [phone]  Fax [phone]      Outpatient Occupational Therapy     [x] Daily Treatment Note   [] Progress Note   [] Discharge Note    Date:  11/18/2021    Patient Name:  Mena Sparks         YOB: 1955    Medical Diagnosis/ICD 10: Unspecified sequelae of unspecified cerebrovascular disease (I69.90)     Treatment Diagnosis:  Right hemiplegia     Onset Date:  November 2019     Referral Date:  11/15/2021     Referring Physician:    Awa Ward. Jonathan Hartley MD                                                            Insurance information: Medicare A & B; AARP      Precautions/ Contraindications:  (pt denies changes to PMHx and intake forms since last seen by OT in July 2021)  Red Flag Symptoms: none  Safety awareness: intact  Other: aphasia    Adhesive allergy: NO  Latex Allergy:  [x]? NO      []? YES     Plan of care sent to provider:    [x]Faxed (date: 11/16/2021  ) []Co-signature    (attempts: 1[] 2 []3[])        Plan of care signed:    []Yes (date:  )           [x]No       Progress Note covers period from (if applicable):    [x]NA    []From          To           Next Progress Note due:   12/14/2021    Visit# / total visits:  2/8    Functional Outcome Measure:   11/16/2021: STREAM: 4/20    Subjective: Patient stated complaint: Decreased function of right arm     Pain level: denies    Plan for Next Session:  NMES  Darek Dean      Objective:       Assessment of UE tone/spasticity: RIGHT UE       7/27/2021  (OT d/c) 11/16/2021         Shoulder flexors 2 1 to 1+         Internal rotators 2 1+         External rotators 1 0         Elbow flexors 1 1         Elbow extensor 1 0         Supinators 0 0         Pronators 1+ 1         Wrist flexors 2 1         Wrist extensors 0 0         Digit flexors 3 1 to 1+         Digit extensors 0 0         Comments: mild fluctuation noted in flexor tone when tested sitting v. Supine     Range of Motion     AROM             LEFT   RIGHT       11/16/2021 11/16/2021     Shoulder:   flex WNL   No active flex  Passive flexion to 85o                        ABD WNL   ~40o     Elbow:      ext/flex WNL   Demo ~15-20o active ext against gravity     Forearm:  sup/pron WNL   Demo ~15o of active supination (<neutral)     Wrist:       ext/flex WNL   No active movement noted     Digits: WNL   Minimal active flexion noted     Comments:    PROM of Right shoulder flex and abduction limited to <90o. PROM Right elbow/wrist/hand WFL with increased time d/t flexor tone . Demo good active scap squeeze. Trace shoulder shrug on right.     Attempts at RUE active movement result in right scap retraction, shld extension with compensation at upper trap, elbow flexion, and pronation.          Exercises:    Exercises in bold performed in department today. Items not bolded are carried forward from prior visits for continuity of the record.   Exercise/Equipment Resistance/Repetitions HEP Other comments      ADL/IADL TRAINING         []        []        []        []       NEUROMUSCULAR RE-EDU and THERAPEUTIC ACTIVITY        WBing 10 min  Seated EOM []      AAROM Seated  scap squeeze, 5x, 5 sec hold  shld shrug, 5x with visual and tactile cue    Supine  scap protractions, 3x  Elbow ext, 5x  Elbow flex, 5x  Shld flex, 3x []      Ye [place]ams on incline  · Red wedge  · Up/down 10x (manual resistance provided by when pulling back down)  · Diagonals right, 10x  · Diagonals left, 10x    Evans on flat table   · 5#  · Horizontal ab/duction 10x  · Forward/back, 10x [] Initially required ace wrap to right hand to maintain grasp on evans    Significant increase in tone in digit flexors upon completion        THERAPEUTIC EXERCISE and MANUAL TECHNIQUES        PROM Supine  Shld flex (85-90o   soft end feel)  Shld abduction  (85-90o  soft end feel)  IR/ER  Elbow ext  Wrist ext  Digit ext []      Scap mobs EOM  All directions []        []       MODALITIES         []        []       SPLINTING         []      Home Exercise Program: TBD    Treatment/Activity Tolerance:    Patients response to treatment:   [x]Patient tolerated treatment well []Patient limited by fatigue   []Patient limited by pain  []Patient limited by other medical complications   []Other:     Assessment:   Pt participating in BUE coordination task to elicit automatic movement in right arm. Demo increase in flexor tone in right digits with BUE movement. Tone normalized after WBing. Cont per OT poc. GOALS: Goals established 11/16/21   Patient stated goal: \"Improve right arm\"  []? Progressing: []? Met: []? Not Met: []? Adjusted     Therapist goals for Patient  Short Term Goals:  to be met in 2 weeks (by 11/30/2021)     Pt will demonstrate 15 degrees of active elbow flexion against gravity, for improved functional task performance. []? Progressing: []? Met: []? Not Met: []? Adjusted     Pt will demonstrate 10 degrees of active wrist extension for improved functional task performance. []? Progressing: []? Met: []? Not Met: []? Adjusted     Long Term Goals:  to be met in 4 weeks (by 12/14/2021)     Pt will demonstrate active right shoulder flexion to 30 degrees against gravity for improved functional task performance.     []? Progressing: []? Met: []? Not Met: []? Adjusted     Pt will demonstrate 25 degrees of right active elbow flexion against gravity for improved functional task performance.   []? Progressing: []? Met: []? Not Met: []? Adjusted     Pt will demonstrate 20 degrees of right active elbow extension for improved functional task performance.    []? Progressing: []? Met: []? Not Met: []? Adjusted     Pt will demo ability to use on right hand as gross assist during bilateral BADL tasks. []? Progressing: []? Met: []? Not Met: []?  Adjusted     Prognosis: [x]Good   [x]Fair   []Poor    Patient Requires Follow-up:  [x]Yes  []No    Plan: [x]Plan of care initiated     [x]Continue per plan of care    [] Alter current plan (see comments)    []Hold pending MD visit []Discharge      ---  ---- --- ---- --- ---- --- ---- --- ---- --- ---- --- ---- --- ---- --- ---- --- --- ---    Therapeutic Exercise/Home Exercise Program:   0 minutes    Therapeutic Activity:  minutes    ADL Trainin minutes      Neuromuscular Re-Education: 45 minutes      Manual Therapy:   minutes    Splintin minutes     Modalities:  0 minutes    Time in: 1000 Time out:  1045    Timed Code Treatment Minutes:   45 min treatment    Total Treatment Minutes:  45 min    Electronically signed by:  Skye Garner OT, OTR/L

## 2021-11-23 ENCOUNTER — HOSPITAL ENCOUNTER (OUTPATIENT)
Dept: SPEECH THERAPY | Age: 66
Setting detail: THERAPIES SERIES
Discharge: HOME | End: 2021-11-23
Payer: MEDICARE

## 2021-11-23 ENCOUNTER — HOSPITAL ENCOUNTER (OUTPATIENT)
Dept: OCCUPATIONAL THERAPY | Age: 66
Setting detail: THERAPIES SERIES
Discharge: HOME | End: 2021-11-23
Payer: MEDICARE

## 2021-11-23 PROCEDURE — 92507 TX SP LANG VOICE COMM INDIV: CPT

## 2021-11-23 PROCEDURE — 97112 NEUROMUSCULAR REEDUCATION: CPT

## 2021-11-23 NOTE — FLOWSHEET NOTE
[facility], [facility], [address], [address]   Phone: [phone]  Fax [phone]      Outpatient Occupational Therapy     [x] Daily Treatment Note   [] Progress Note   [] Discharge Note    Date:  11/23/2021    Patient Name:  Toby Goldberg         YOB: 1955    Medical Diagnosis/ICD 10: Unspecified sequelae of unspecified cerebrovascular disease (I69.90)     Treatment Diagnosis:  Right hemiplegia     Onset Date:  November 2019     Referral Date:  11/15/2021     Referring Physician:    Quan Dolan MD                                                            Insurance information: Medicare A & B; [employer]ARP      Precautions/ Contraindications:  (pt denies changes to PMHx and intake forms since last seen by OT in July 2021)  Red Flag Symptoms: none  Safety awareness: intact  Other: aphasia    Adhesive allergy: NO  Latex Allergy:  [x]? NO      []? YES     Plan of care sent to provider:    [x]Faxed (date: 11/16/2021  ) []Co-signature    (attempts: 1[] 2 []3[])        Plan of care signed:    []Yes (date:  )           [x]No       Progress Note covers period from (if applicable):    [x]NA    []From          To           Next Progress Note due:   12/14/2021    Visit# / total visits:  3/8    Functional Outcome Measure:   11/16/2021: STREAM: 4/20    Subjective: Patient reports no change in right arm function.      Pain level: denies    Plan for Next Session:  NMES  Darek Dean      Objective:       Assessment of UE tone/spasticity: RIGHT UE       7/27/2021  (OT d/c) 11/16/2021 11/23/2021       Shoulder flexors 2 1 to 1+ 1        Internal rotators 2 1+  1       External rotators 1 0  0       Elbow flexors 1 1  1       Elbow extensor 1 0  1       Supinators 0 0  0       Pronators 1+ 1  0       Wrist flexors 2 1  1       Wrist extensors 0 0  0       Digit flexors 3 1 to 1+  1       Digit extensors 0 0  0       Comments: mild fluctuation noted in flexor tone when tested sitting v. Supine     Range of Motion     AROM             LEFT   RIGHT       11/16/2021 11/16/2021     Shoulder:   flex WNL   No active flex  Passive flexion to 85o                        ABD WNL   ~40o     Elbow:      ext/flex WNL   Demo ~15-20o active ext against gravity     Forearm:  sup/pron WNL   Demo ~15o of active supination (<neutral)     Wrist:       ext/flex WNL   No active movement noted     Digits: WNL   Minimal active flexion noted     Comments:    PROM of Right shoulder flex and abduction limited to <90o. PROM Right elbow/wrist/hand WFL with increased time d/t flexor tone . Demo good active scap squeeze. Trace shoulder shrug on right.     Attempts at RUE active movement result in right scap retraction, shld extension with compensation at upper trap, elbow flexion, and pronation.          Exercises:    Exercises in bold performed in department today. Items not bolded are carried forward from prior visits for continuity of the record.   Exercise/Equipment Resistance/Repetitions HEP Other comments      ADL/IADL TRAINING         []        []        []        []       NEUROMUSCULAR RE-EDU and THERAPEUTIC ACTIVITY        WBing 10 min  Seated EOM []      AAROM Seated  scap squeeze, 5x, 5 sec hold  shld shrug, 5x with visual and tactile cue    Supine  scap protractions, 3x  Elbow ext, 5x  Elbow flex, 5x  Shld flex, 3x []      Perfecto Forester on incline  · Red wedge  · Up/down 10x (manual resistance provided by when pulling back down)  · Diagonals right, 10x  · Diagonals left, 10x    Evans on flat table   · 5#  · Horizontal ab/duction 10x  · Forward/back, 10x [] Initially required ace wrap to right hand to maintain grasp on evans    Significant increase in tone in digit flexors upon completion        THERAPEUTIC EXERCISE and MANUAL TECHNIQUES        PROM Supine  Shld flex (100o   soft end feel)  Shld abduction  (90o  soft end feel)  IR/ER  Elbow ext  Wrist ext  Digit ext []      Scap mobs EOM  All directions []        []       MODALITIES         []        []       SPLINTING         []      Home Exercise Program: TBD    Treatment/Activity Tolerance:    Patients response to treatment:   [x]Patient tolerated treatment well []Patient limited by fatigue   []Patient limited by pain  []Patient limited by other medical complications   []Other:     Assessment:   Pt participating in BUE coordination task to elicit automatic movement in right arm. Cont to demo increase in flexor tone in right digits with BUE movement. Tone normalized after WBing. Cont per OT poc. GOALS: Goals established 11/16/21   Patient stated goal: \"Improve right arm\"  []? Progressing: []? Met: []? Not Met: []? Adjusted     Therapist goals for Patient  Short Term Goals:  to be met in 2 weeks (by 11/30/2021)     Pt will demonstrate 15 degrees of active elbow flexion against gravity, for improved functional task performance. []? Progressing: []? Met: []? Not Met: []? Adjusted     Pt will demonstrate 10 degrees of active wrist extension for improved functional task performance. []? Progressing: []? Met: []? Not Met: []? Adjusted     Long Term Goals:  to be met in 4 weeks (by 12/14/2021)     Pt will demonstrate active right shoulder flexion to 30 degrees against gravity for improved functional task performance.     []? Progressing: []? Met: []? Not Met: []? Adjusted     Pt will demonstrate 25 degrees of right active elbow flexion against gravity for improved functional task performance.   []? Progressing: []? Met: []? Not Met: []? Adjusted     Pt will demonstrate 20 degrees of right active elbow extension for improved functional task performance.    []? Progressing: []? Met: []? Not Met: []? Adjusted     Pt will demo ability to use on right hand as gross assist during bilateral BADL tasks. []? Progressing: []? Met: []? Not Met: []?  Adjusted     Prognosis: [x]Good   [x]Fair   []Poor    Patient Requires Follow-up:  [x]Yes  []No    Plan: [x]Plan of care initiated     [x]Continue per plan of care    [] Alter current plan (see comments)    []Hold pending MD visit []Discharge      ---  ---- --- ---- --- ---- --- ---- --- ---- --- ---- --- ---- --- ---- --- ---- --- --- ---    Therapeutic Exercise/Home Exercise Program:   0 minutes    Therapeutic Activity:  minutes    ADL Trainin minutes      Neuromuscular Re-Education: 45 minutes      Manual Therapy:   minutes    Splintin minutes     Modalities:  0 minutes    Time [id number] Time out:  1000    Timed Code Treatment Minutes:   45 min treatment    Total Treatment Minutes:  45 min    Electronically signed by:  Kaitlin Blevins OT, OTR/L

## 2021-11-30 ENCOUNTER — HOSPITAL ENCOUNTER (OUTPATIENT)
Dept: OCCUPATIONAL THERAPY | Age: 66
Setting detail: THERAPIES SERIES
Discharge: HOME | End: 2021-11-30
Payer: MEDICARE

## 2021-11-30 ENCOUNTER — HOSPITAL ENCOUNTER (OUTPATIENT)
Dept: SPEECH THERAPY | Age: 66
Setting detail: THERAPIES SERIES
Discharge: HOME | End: 2021-11-30
Payer: MEDICARE

## 2021-11-30 PROCEDURE — 97112 NEUROMUSCULAR REEDUCATION: CPT

## 2021-11-30 PROCEDURE — 97140 MANUAL THERAPY 1/> REGIONS: CPT

## 2021-11-30 PROCEDURE — 92507 TX SP LANG VOICE COMM INDIV: CPT

## 2021-11-30 PROCEDURE — 97535 SELF CARE MNGMENT TRAINING: CPT

## 2021-11-30 NOTE — FLOWSHEET NOTE
[facility], [facility], [facility] [address], [address]  Phone: [phone]  Fax [phone]      Outpatient Occupational Therapy     [x] Daily Treatment Note   [] Progress Note   [] Discharge Note    Date:  11/30/2021    Patient Name:  Romulo Ann         YOB: 1955    Medical Diagnosis/ICD 10: Unspecified sequelae of unspecified cerebrovascular disease (I69.90)     Treatment Diagnosis:  Right hemiplegia     Onset Date:  November 2019     Referral Date:  11/15/2021     Referring Physician:    Barry Gates. Vidya Harvey MD                                                            Insurance information: Medicare A & B; AARP      Precautions/ Contraindications:  (pt denies changes to PMHx and intake forms since last seen by OT in July 2021)  Red Flag Symptoms: none  Safety awareness: intact  Other: aphasia    Adhesive allergy: NO  Latex Allergy:  [x]? NO      []? YES     Plan of care sent to provider:    [x]Faxed (date: 11/16/2021  ) []Co-signature    (attempts: 1[] 2 []3[])        Plan of care signed:    []Yes (date:  )           [x]No       Progress Note covers period from (if applicable):    [x]NA    []From          To           Next Progress Note due:   12/14/2021    Visit# / total visits:  4/8    Functional Outcome Measure:   11/16/2021: STREAM: 4/20    Subjective: Patient reports no change in right arm function.      Pain level: denies    Plan for Next Session:  NMES  Darek Dean      Objective:       Assessment of UE tone/spasticity: RIGHT UE       7/27/2021  (OT d/c) 11/16/2021 11/23/2021 11/30/2021     Shoulder flexors 2 1 to 1+ 1   1+     Internal rotators 2 1+  1  1     External rotators 1 0  0  0     Elbow flexors 1 1  1 1+      Elbow extensor 1 0  1  1     Supinators 0 0  0  0     Pronators 1+ 1  0 1      Wrist flexors 2 1  1  1+     Wrist extensors 0 0  0  0     Digit flexors 3 1 to 1+  1  1     Digit extensors 0 0  0  0     Comments: mild fluctuation noted in flexor tone when tested sitting v. Supine     Range of Motion     AROM             LEFT   RIGHT       11/16/2021 11/16/2021     Shoulder:   flex WNL   No active flex  Passive flexion to 85o                        ABD WNL   ~40o     Elbow:      ext/flex WNL   Demo ~15-20o active ext against gravity     Forearm:  sup/pron WNL   Demo ~15o of active supination (<neutral)     Wrist:       ext/flex WNL   No active movement noted     Digits: WNL   Minimal active flexion noted     Comments:    PROM of Right shoulder flex and abduction limited to <90o. PROM Right elbow/wrist/hand WFL with increased time d/t flexor tone . Demo good active scap squeeze. Trace shoulder shrug on right.     Attempts at RUE active movement result in right scap retraction, shld extension with compensation at upper trap, elbow flexion, and pronation.          Exercises:    Exercises in bold performed in department today. Items not bolded are carried forward from prior visits for continuity of the record.   Exercise/Equipment Resistance/Repetitions HEP Other comments      ADL/IADL TRAINING       ADL Pt unable to clip left finger nails    Educated on and Printed info regarding one handed nail clippers []        []        []        []       NEUROMUSCULAR RE-EDU and THERAPEUTIC ACTIVITY        WBing 10 min  Seated EOM []      AAROM Seated  scap squeeze, 5x, 5 sec hold  shld shrug, 5x with visual and tactile cue    Supine  scap protractions, 3x  Elbow ext, 5x  Elbow flex, 5x  Shld flex, 3x []      Laurell Osgood on incline  · Red wedge  · Up/down 10x (manual resistance provided by when pulling back down)  · Diagonals right, 10x  · Diagonals left, 10x    Evans on flat table   · 5#  · Horizontal ab/duction 10x  · Forward/back, 10x [] Initially required ace wrap to right hand to maintain grasp on evans    Significant increase in tone in digit flexors upon completion        THERAPEUTIC EXERCISE and MANUAL TECHNIQUES        PROM Supine  Shld flex (100o   soft end feel)  Shld abduction  (90o  soft end feel)  IR/ER  Elbow ext  Wrist ext  Digit ext []      Scap mobs EOM  All directions []        []       MODALITIES         []        []       SPLINTING         []      Home Exercise Program: TBD    Treatment/Activity Tolerance:    Patients response to treatment:   [x]Patient tolerated treatment well []Patient limited by fatigue   []Patient limited by pain  []Patient limited by other medical complications   []Other:     Assessment:   Pt tolerating PROM, WBing and scap mobs. Demo decreased in tone after WBing, however cont to demo flexor synergy when attempting active movement with RUE. Cont per OT poc. GOALS: Goals established 11/16/21   Patient stated goal: \"Improve right arm\"  []? Progressing: []? Met: []? Not Met: []? Adjusted     Therapist goals for Patient  Short Term Goals:  to be met in 2 weeks (by 11/30/2021)     Pt will demonstrate 15 degrees of active elbow flexion against gravity, for improved functional task performance. []? Progressing: []? Met: []? Not Met: []? Adjusted     Pt will demonstrate 10 degrees of active wrist extension for improved functional task performance. []? Progressing: []? Met: [x]? Not Met: []? Adjusted     Long Term Goals:  to be met in 4 weeks (by 12/14/2021)     Pt will demonstrate active right shoulder flexion to 30 degrees against gravity for improved functional task performance.     []? Progressing: []? Met: []? Not Met: []? Adjusted     Pt will demonstrate 25 degrees of right active elbow flexion against gravity for improved functional task performance.   []? Progressing: []? Met: []? Not Met: []? Adjusted     Pt will demonstrate 20 degrees of right active elbow extension for improved functional task performance.    []? Progressing: []? Met: []? Not Met: []? Adjusted     Pt will demo ability to use on right hand as gross assist during bilateral BADL tasks. []?  Progressing: []? Met: []? Not Met: []?  Adjusted     Prognosis: [x]Good   [x]Fair   []Poor    Patient Requires Follow-up:  [x]Yes  []No    Plan: []Plan of care initiated     [x]Continue per plan of care    [] Alter current plan (see comments)    []Hold pending MD visit []Discharge      ---  ---- --- ---- --- ---- --- ---- --- ---- --- ---- --- ---- --- ---- --- ---- --- --- ---    Therapeutic Exercise/Home Exercise Program:   0 minutes    Therapeutic Activity:  minutes    ADL Training:  15 minutes      Neuromuscular Re-Education: 15 minutes      Manual Therapy: 15  minutes    Splintin minutes     Modalities:  0 minutes    Time in:1315 Time out: 1400    Timed Code Treatment Minutes:   45 min treatment    Total Treatment Minutes:  45 min    Electronically signed by:  Brooks Christianson OT, OTR/L

## 2021-11-30 NOTE — FLOWSHEET NOTE
[facility], [facility], [address], [address]   Phone: [phone]  Fax [phone]      Speech-Language Pathology  Daily Treatment Note    Date:  2021    Patient Name:  Snow Parikh    :  1955  MRN: [id number]  Restrictions/Precautions:  Limited communication ability due to significant Aphasia  Treatment Diagnosis:    Codes     Aphasia as late effect of cerebrovascular accident (CVA)    -  Primary   Insurance/Certification information: Medicare A & B; [address]   Referring Physician:  Gertrude Carlson. Saravanan Hoffman MD  Plan of care signed (Y/N):  Y  Visit# / total visits:     Pain level: no c/o pain    Progress Note: []  Yes  [x]  No  Next due by: Visit #10: 6/10     Subjective: The pt was in overall good spirits. He continues to express frustration regarding his difficulty with communication. Objective:   Short-term Goals:  Goal 1: NEW GOAL: The pt will say indivdual sentences, in response to therapy tasks, with 90% accuracy, mod-max cues:  - 70% (/10), mod-max cues and extra time; targeted via open ended questions    Goal 2: NEW GOAL:   The pt will complete moderately confrontational and responsive naming with 90% accuracy, min-mod cues:  - Confrontational: 70% (7/10), min-mod cues  - Responsive: 60% (6/10), min-mod cues    Goal 3: NEW GOAL:The pt will demonstrate reading comprehension of single phrases/short sentences with 90% accuracy, mod cues  - Goal targeted indirectly through other treatment tasks. Goal 4: NEW GOAL: The pt will verbally identify and label numbers 11-20 with the numbers being out of logical order, 90% accuracy, mod cues:  - 40% (4/10), mod cues    Goal 5:  The pt will complete basic confrontational and responsive naming with 75% accuracy, mod-max cues:  -  GOAL MET    Goal 6: The pt will say indivdual phrase and short sentences, in response to therapy tasks, with 70% accuracy, mod-max cues:  - GOAL MET    Goal 7: The pt will write single basic words with 50% accuracy, mod-max cues:  - DISCONTINUE THIS GOAL DUE TO LACK OF PROGRESS     Goal 8: NEW GOAL: The pt will say indivdual phrase and short sentences, in response to therapy tasks, with 90% accuracy, mod cues:  - GOAL MET     Goal 9: The pt will use an AAC speech generating device to answer given basic questions with 80% accuracy, mod-max cues:  - GOAL DISCONTINUED. The pt has not been using or bring his iPad and has not been wanting to use AAC communication due to improved verbal communication. Goal 10: NEW GOAL: The pt will verbally identify and label numbers 1-10 with the numbers being out of logical order, 90% accuracy, mod cues:  - 90% (/10), mod cues; the pt tended to count in order 1-10 to determine the given number  GOAL MET    Goal 11: NEW GOAL: The pt will complete basic confrontational and responsive naming with 90% accuracy, min cues  - GOAL MET      Other Treatments:    Assessment:   The pt had more difficulty with labeling numbers 11-20 today as well as more difficulty with confrontational naming. Plan:   Continued Speech Therapy services 2 x week for 6 weeks. Total Treatment Time / Charges     Time in Time out Total Time / units   Cognitive Tx         Speech Tx 1227 1312 45 min / 1 unit   Dysphagia Tx          Signature:     Adam Tyson MA, [id number] [id number]  Speech-Language Pathologist  Phone #: [phone]  Fax #: [phone]

## 2021-12-02 ENCOUNTER — HOSPITAL ENCOUNTER (OUTPATIENT)
Dept: OCCUPATIONAL THERAPY | Age: 66
Setting detail: THERAPIES SERIES
Discharge: HOME | End: 2021-12-02
Payer: MEDICARE

## 2021-12-02 ENCOUNTER — HOSPITAL ENCOUNTER (OUTPATIENT)
Dept: SPEECH THERAPY | Age: 66
Setting detail: THERAPIES SERIES
Discharge: HOME | End: 2021-12-02
Payer: MEDICARE

## 2021-12-02 PROCEDURE — 97535 SELF CARE MNGMENT TRAINING: CPT

## 2021-12-02 PROCEDURE — 97112 NEUROMUSCULAR REEDUCATION: CPT

## 2021-12-02 PROCEDURE — 92507 TX SP LANG VOICE COMM INDIV: CPT

## 2021-12-02 PROCEDURE — 97140 MANUAL THERAPY 1/> REGIONS: CPT

## 2021-12-02 NOTE — FLOWSHEET NOTE
[facility], [facility], [address], [address]   Phone: [phone]  Fax [phone]      Speech-Language Pathology  Daily Treatment Note    Date:  2021    Patient Name:  Alicia Payne    :  1955  MRN: [id number]  Restrictions/Precautions:  Limited communication ability due to significant Aphasia  Treatment Diagnosis:    Codes     Aphasia as late effect of cerebrovascular accident (CVA)    -  Primary   Insurance/Certification information: Medicare A & B; [address]   Referring Physician:  Delia Stock. Yelena Medrano MD  Plan of care signed (Y/N):  Y  Visit# / total visits:     Pain level: no c/o pain    Progress Note: []  Yes  [x]  No  Next due by: Visit #10: 7/10     Subjective: The pt was pleasant and cooperative. He still reports his frustration with his difficulty communicating. Objective:   Short-term Goals:  Goal 1: NEW GOAL: The pt will say indivdual sentences, in response to therapy tasks, with 90% accuracy, mod-max cues:  - 70% (10), mod-max cues and extra time; targeted via open ended questions    Goal 2: NEW GOAL:   The pt will complete moderately confrontational and responsive naming with 90% accuracy, min-mod cues:  - Confrontational: DNT  - Responsive: 63% (5/8), min-mod cues    Goal 3: NEW GOAL:The pt will demonstrate reading comprehension of single phrases/short sentences with 90% accuracy, mod cues  - 50% (4/8), mod cues; targeted by having him read a phrase and then answer yes/no questions about the phase. Noted increased yes/no reversals today especially at the beginning of the session. Goal 4: NEW GOAL: The pt will verbally identify and label numbers 11-20 with the numbers being out of logical order, 90% accuracy, mod cues:  - 40% (4/10), mod cues    Goal 5:  The pt will complete basic confrontational and responsive naming with 75% accuracy, mod-max cues:  -  GOAL MET    Goal 6: The pt will say indivdual phrase and short sentences, in response to therapy tasks, with 70% accuracy, mod-max cues:  - GOAL MET    Goal 7: The pt will write single basic words with 50% accuracy, mod-max cues:  - DISCONTINUE THIS GOAL DUE TO LACK OF PROGRESS     Goal 8: NEW GOAL: The pt will say indivdual phrase and short sentences, in response to therapy tasks, with 90% accuracy, mod cues:  - GOAL MET     Goal 9: The pt will use an AAC speech generating device to answer given basic questions with 80% accuracy, mod-max cues:  - GOAL DISCONTINUED. The pt has not been using or bring his iPad and has not been wanting to use AAC communication due to improved verbal communication. Goal 10: NEW GOAL: The pt will verbally identify and label numbers 1-10 with the numbers being out of logical order, 90% accuracy, mod cues:  - 90% (/10), mod cues; the pt tended to count in order 1-10 to determine the given number  GOAL MET    Goal 11: NEW GOAL: The pt will complete basic confrontational and responsive naming with 90% accuracy, min cues  - GOAL MET      Other Treatments:    Assessment:   The pt again had mor difficulty labeling numbers this date. Plan:   Continued Speech Therapy services 2 x week for 6 weeks. Total Treatment Time / Charges     Time in Time out Total Time / units   Cognitive Tx         Speech Tx 1220 1305 45 min / 1 unit   Dysphagia Tx          Signature:     Miladis Rogers MA, [id number] [facility] [id number]  Speech-Language Pathologist  Phone #: [phone]  Fax #: [phone]

## 2021-12-02 NOTE — FLOWSHEET NOTE
[facility], [facility], [facility] [address], [address]   Phone: [phone]  Fax [phone]      Outpatient Occupational Therapy     [x] Daily Treatment Note   [] Progress Note   [] Discharge Note    Date:  12/2/2021    Patient Name:  Navi Hutchinson         YOB: 1955    Medical Diagnosis/ICD 10: Unspecified sequelae of unspecified cerebrovascular disease (I69.90)     Treatment Diagnosis:  Right hemiplegia     Onset Date:  November 2019     Referral Date:  11/15/2021     Referring Physician:    Shaye De Leon. Eunice Eden MD                                                            Insurance information: Medicare A & B; AARP      Precautions/ Contraindications:  (pt denies changes to PMHx and intake forms since last seen by OT in July 2021)  Red Flag Symptoms: none  Safety awareness: intact  Other: aphasia    Adhesive allergy: NO  Latex Allergy:  [x]? NO      []? YES     Plan of care sent to provider:    [x]Faxed (date: 11/16/2021  ) []Co-signature    (attempts: 1[] 2 []3[])        Plan of care signed:    []Yes (date:  )           [x]No       Progress Note covers period from (if applicable):    [x]NA    []From          To           Next Progress Note due:   12/14/2021    Visit# / total visits:  5/8    Functional Outcome Measure:   11/16/2021: STREAM: 4/20    Subjective: Patient reports no change in right arm function.      Pain level: denies    Plan for Next Session:  NMES  WBing    Objective:       Assessment of UE tone/spasticity: RIGHT UE       7/27/2021  (OT d/c) 11/16/2021 11/23/2021 11/30/2021     Shoulder flexors 2 1 to 1+ 1   1+     Internal rotators 2 1+  1  1     External rotators 1 0  0  0     Elbow flexors 1 1  1 1+      Elbow extensor 1 0  1  1     Supinators 0 0  0  0     Pronators 1+ 1  0 1      Wrist flexors 2 1  1  1+     Wrist extensors 0 0  0  0     Digit flexors 3 1 to 1+  1  1     Digit extensors 0 0  0  0   Comments: mild fluctuation noted in flexor tone when tested sitting v. Supine     Range of Motion     AROM             LEFT   RIGHT       11/16/2021 11/16/2021     Shoulder:   flex WNL   No active flex  Passive flexion to 85o                        ABD WNL   ~40o     Elbow:      ext/flex WNL   Demo ~15-20o active ext against gravity     Forearm:  sup/pron WNL   Demo ~15o of active supination (<neutral)     Wrist:       ext/flex WNL   No active movement noted     Digits: WNL   Minimal active flexion noted     Comments:    PROM of Right shoulder flex and abduction limited to <90o. PROM Right elbow/wrist/hand WFL with increased time d/t flexor tone . Demo good active scap squeeze. Trace shoulder shrug on right. Attempts at RUE active movement result in right scap retraction, shld extension with compensation at upper trap, elbow flexion, and pronation.      Exercises:    Exercises in bold performed in department today. Items not bolded are carried forward from prior visits for continuity of the record. Exercise/Equipment Resistance/Repetitions HEP Other comments      ADL/IADL TRAINING       ADL Pt unable to clip left finger nails    Educated on and Printed info regarding one handed nail clippers []      Bed mobility in prep for massage Problem solving positioning for pt's plan to see a LMT. Pt unable to tolerate obtain prone position in elevated table.     Demo ability to move from supine<>EOM on narrow elevated table with SBA  · Benefits from pillow under RUE when supine    Pt able to move from supine to sidelying (on left side)  · Benefits from pillow under right elbow and b/t knees when sidelying   []        []        []       NEUROMUSCULAR RE-EDU and THERAPEUTIC ACTIVITY        WBing 5+4 min  Seated EOM []      AAROM Seated  scap squeeze, 5x, 5 sec hold  shld shrug, 5x with visual and tactile cue    Supine  scap protractions, 3x  Elbow ext, 5x  Elbow flex, 5x  Shld flex, 3x []      Annie cline incline  · Red wedge  · Up/down 10x (manual resistance provided by when pulling back down)  · Diagonals right, 10x  · Diagonals left, 10x    Evans on flat table   · 5#  · Horizontal ab/duction 10x  · Forward/back, 10x [] Initially required assist to maintain grasp on evans  Increase in tone in digit flexors upon completion        THERAPEUTIC EXERCISE and MANUAL TECHNIQUES        PROM Supine  Shld flex (100o   soft end feel)  Shld abduction  (90o  soft end feel)  IR/ER  Elbow ext  Wrist ext  Digit ext []      Scap mobs EOM and side lying  All directions [] Indicates less tension upon completion       []       MODALITIES         []        []       SPLINTING         []      Home Exercise Program: self ROM, scap squeezes, WBing (if able)    Treatment/Activity Tolerance:    Patients response to treatment:   [x]Patient tolerated treatment well []Patient limited by fatigue   []Patient limited by pain  []Patient limited by other medical complications   []Other:     Assessment:   Pt tolerating AAROM, PROM, WBing and scap mobs. Demo decreased in tone after WBing, however cont to demo flexor synergy when attempting active movement with RUE. Cont per OT poc. GOALS: Goals established 11/16/21   Patient stated goal: \"Improve right arm\"  []? Progressing: []? Met: []? Not Met: []? Adjusted     Therapist goals for Patient  Short Term Goals:  to be met in 2 weeks (by 11/30/2021)     Pt will demonstrate 15 degrees of active elbow flexion against gravity, for improved functional task performance. []? Progressing: []? Met: []? Not Met: []? Adjusted     Pt will demonstrate 10 degrees of active wrist extension for improved functional task performance. []? Progressing: []? Met: [x]? Not Met: []? Adjusted     Long Term Goals:  to be met in 4 weeks (by 12/14/2021)     Pt will demonstrate active right shoulder flexion to 30 degrees against gravity for improved functional task performance.     []? Progressing: []? Met: []?  Not Met: []? Adjusted     Pt will demonstrate 25 degrees of right active elbow flexion against gravity for improved functional task performance.   []? Progressing: []? Met: []? Not Met: []? Adjusted     Pt will demonstrate 20 degrees of right active elbow extension for improved functional task performance.    []? Progressing: []? Met: []? Not Met: []? Adjusted     Pt will demo ability to use on right hand as gross assist during bilateral BADL tasks. []? Progressing: []? Met: []? Not Met: []?  Adjusted     Prognosis: [x]Good   [x]Fair   []Poor    Patient Requires Follow-up:  [x]Yes  []No    Plan: []Plan of care initiated     [x]Continue per plan of care    [] Alter current plan (see comments)    []Hold pending MD visit []Discharge      ---  ---- --- ---- --- ---- --- ---- --- ---- --- ---- --- ---- --- ---- --- ---- --- --- ---    Therapeutic Exercise/Home Exercise Program:   0 minutes    Therapeutic Activity:   minutes    ADL Training:  15 minutes      Neuromuscular Re-Education: 25 minutes      Manual Therapy: 15  minutes    Splintin minutes     Modalities:  0 minutes    Time in:1305 Time out: 1400    Timed Code Treatment Minutes:   55 min treatment    Total Treatment Minutes:  55 min    Electronically signed by:  Gurwinder Man OT, OTR/L

## 2021-12-07 ENCOUNTER — HOSPITAL ENCOUNTER (OUTPATIENT)
Dept: OCCUPATIONAL THERAPY | Age: 66
Setting detail: THERAPIES SERIES
Discharge: HOME | End: 2021-12-07
Payer: MEDICARE

## 2021-12-07 ENCOUNTER — HOSPITAL ENCOUNTER (OUTPATIENT)
Dept: SPEECH THERAPY | Age: 66
Setting detail: THERAPIES SERIES
Discharge: HOME | End: 2021-12-07
Payer: MEDICARE

## 2021-12-07 PROCEDURE — 97112 NEUROMUSCULAR REEDUCATION: CPT

## 2021-12-07 PROCEDURE — 97140 MANUAL THERAPY 1/> REGIONS: CPT

## 2021-12-07 PROCEDURE — 92507 TX SP LANG VOICE COMM INDIV: CPT

## 2021-12-07 NOTE — FLOWSHEET NOTE
[facility], [facility], [address]  [place], [address]   Phone: [phone]  Fax [phone]      Speech-Language Pathology  Daily Treatment Note    Date:  2021    Patient Name:  Ritesh Knowles    :  1955  MRN: [id number]  Restrictions/Precautions:  Limited communication ability due to significant Aphasia  Treatment Diagnosis:    Codes     Aphasia as late effect of cerebrovascular accident (CVA)    -  Primary   Insurance/Certification information: Medicare A & B; [address]   Referring Physician:  Charline Colby. Marie Willingham MD  Plan of care signed (Y/N):  Y  Visit# / total visits:     Pain level: no c/o pain    Progress Note: []  Yes  [x]  No  Next due by: Visit #10: 8/10     Subjective: The pt was his typically pleasant self. Objective:   Short-term Goals:  Goal 1: NEW GOAL: The pt will say indivdual sentences, in response to therapy tasks, with 90% accuracy, mod-max cues:  - 63% (5/8), mod-max cues and extra time; targeted via picture description    Goal 2: NEW GOAL:   The pt will complete moderately confrontational and responsive naming with 90% accuracy, min-mod cues:  - Confrontational: 70% (7/10), min-mod cues  - Responsive: 80% (8/10), min-mod cues    Goal 3: NEW GOAL:The pt will demonstrate reading comprehension of single phrases/short sentences with 90% accuracy, mod cues  - Goal targeted indirectly through other treatment tasks. Goal 4: NEW GOAL: The pt will verbally identify and label numbers 11-20 with the numbers being out of logical order, 90% accuracy, mod cues:  - 40% (4/10), mod cues    Goal 5: The pt will complete basic confrontational and responsive naming with 75% accuracy, mod-max cues:  -  GOAL MET    Goal 6: The pt will say indivdual phrase and short sentences, in response to therapy tasks, with 70% accuracy, mod-max cues:  - GOAL MET    Goal 7:  The pt will write single basic words with 50% accuracy, mod-max

## 2021-12-07 NOTE — FLOWSHEET NOTE
[facility], [facility], [address]  Phone: [phone]  Fax [phone]      Outpatient Occupational Therapy     [x] Daily Treatment Note   [] Progress Note   [] Discharge Note    Date:  12/7/2021    Patient Name:  Alicia Payne         YOB: 1955    Medical Diagnosis/ICD 10: Unspecified sequelae of unspecified cerebrovascular disease (I69.90)     Treatment Diagnosis:  Right hemiplegia     Onset Date:  November 2019     Referral Date:  11/15/2021     Referring Physician:    Deniece Olszewski. Carmen Mendez MD                                                            Insurance information: Medicare A & B; AARP      Precautions/ Contraindications:  (pt denies changes to PMHx and intake forms since last seen by OT in July 2021)  Red Flag Symptoms: none  Safety awareness: intact  Other: aphasia    Adhesive allergy: NO  Latex Allergy:  [x]? NO      []? YES     Plan of care sent to provider:    [x]Faxed (date: 11/16/2021  ) []Co-signature    (attempts: 1[] 2 []3[])        Plan of care signed:    []Yes (date:  )           [x]No       Progress Note covers period from (if applicable):    [x]NA    []From          To           Next Progress Note due:   12/14/2021    Visit# / total visits:  6/8    Functional Outcome Measure:   11/16/2021: STREAM: 4/20    Subjective: Patient reports no change in right arm function.      Pain level: denies    Plan for Next Session:  NMES  WBing    Objective:       Assessment of UE tone/spasticity: RIGHT UE       7/27/2021  (OT d/c) 11/16/2021 11/23/2021 11/30/2021     Shoulder flexors 2 1 to 1+ 1   1+     Internal rotators 2 1+  1  1     External rotators 1 0  0  0     Elbow flexors 1 1  1 1+      Elbow extensor 1 0  1  1     Supinators 0 0  0  0     Pronators 1+ 1  0 1      Wrist flexors 2 1  1  1+     Wrist extensors 0 0  0  0     Digit flexors 3 1 to 1+  1  1     Digit extensors 0 0  0  0   Comments: mild fluctuation noted in flexor tone when tested sitting v. Supine     Range of Motion     AROM             LEFT   RIGHT       11/16/2021 11/16/2021     Shoulder:   flex WNL   No active flex  Passive flexion to 85o                        ABD WNL   ~40o     Elbow:      ext/flex WNL   Demo ~15-20o active ext against gravity     Forearm:  sup/pron WNL   Demo ~15o of active supination (<neutral)     Wrist:       ext/flex WNL   No active movement noted     Digits: WNL   Minimal active flexion noted     Comments:    PROM of Right shoulder flex and abduction limited to <90o. PROM Right elbow/wrist/hand WFL with increased time d/t flexor tone . Demo good active scap squeeze. Trace shoulder shrug on right. Attempts at RUE active movement result in right scap retraction, shld extension with compensation at upper trap, elbow flexion, and pronation.      Exercises:    Exercises in bold performed in department today. Items not bolded are carried forward from prior visits for continuity of the record. Exercise/Equipment Resistance/Repetitions HEP Other comments      ADL/IADL TRAINING       ADL Pt unable to clip left finger nails    Educated on and Printed info regarding one handed nail clippers []      Bed mobility in prep for massage Problem solving positioning for pt's plan to see a LMT. Pt unable to tolerate obtain prone position in elevated table.     Demo ability to move from supine<>EOM on narrow elevated table with SBA  · Benefits from pillow under RUE when supine    Pt able to move from supine to sidelying (on left side)  · Benefits from pillow under right elbow and b/t knees when sidelying   []        []        []       NEUROMUSCULAR RE-EDU and THERAPEUTIC ACTIVITY        WBing 10 min  Seated EOM []      AAROM Seated  scap squeeze, 5x, 5 sec hold  shld shrug, 5x with visual and tactile cue    Supine  scap protractions, 3x  Elbow ext, 5x  Elbow flex, 5x  Shld flex, 3x []      O[address] Speedy on incline  · Red wedge  · Up/down 10x (manual resistance provided by when pulling back down)  · Diagonals right, 10x  · Diagonals left, 10x    Evans on flat table   · 5#  · Horizontal ab/duction 10x  · Forward/back, 10x [] Initially required assist to maintain grasp on evans  Increase in tone in digit flexors upon completion        THERAPEUTIC EXERCISE and MANUAL TECHNIQUES        PROM Supine  Shld flex (100o   soft end feel)  Shld abduction  (90o  soft end feel)  IR/ER  Elbow ext  Wrist ext  Digit ext []      Scap mobs EOM and side lying  All directions [] Indicates less tension upon completion       []       MODALITIES         []        []       SPLINTING         []      Home Exercise Program: self ROM, scap squeezes, WBing (if able)    Treatment/Activity Tolerance:    Patients response to treatment:   [x]Patient tolerated treatment well []Patient limited by fatigue   []Patient limited by pain  []Patient limited by other medical complications   []Other:     Assessment:   Pt tolerating PROM, WBing and scap mobs. Demo decreased in tone after WBing, however cont to demo flexor synergy when attempting active movement with RUE. Cont per OT poc. GOALS: Goals established 11/16/21   Patient stated goal: \"Improve right arm\"  []? Progressing: []? Met: []? Not Met: []? Adjusted     Therapist goals for Patient  Short Term Goals:  to be met in 2 weeks (by 11/30/2021)     Pt will demonstrate 15 degrees of active elbow flexion against gravity, for improved functional task performance. []? Progressing: []? Met: []? Not Met: []? Adjusted     Pt will demonstrate 10 degrees of active wrist extension for improved functional task performance. []? Progressing: []? Met: [x]? Not Met: []? Adjusted     Long Term Goals:  to be met in 4 weeks (by 12/14/2021)     Pt will demonstrate active right shoulder flexion to 30 degrees against gravity for improved functional task performance.     []? Progressing: []? Met: []?  Not Met: []? Adjusted     Pt will demonstrate 25 degrees of right active elbow flexion against gravity for improved functional task performance.   []? Progressing: []? Met: []? Not Met: []? Adjusted     Pt will demonstrate 20 degrees of right active elbow extension for improved functional task performance.    []? Progressing: []? Met: []? Not Met: []? Adjusted     Pt will demo ability to use on right hand as gross assist during bilateral BADL tasks. []? Progressing: []? Met: []? Not Met: []?  Adjusted     Prognosis: [x]Good   [x]Fair   []Poor    Patient Requires Follow-up:  [x]Yes  []No    Plan: []Plan of care initiated     [x]Continue per plan of care    [] Alter current plan (see comments)    []Hold pending MD visit []Discharge      ---  ---- --- ---- --- ---- --- ---- --- ---- --- ---- --- ---- --- ---- --- ---- --- --- ---    Therapeutic Exercise/Home Exercise Program:   0 minutes    Therapeutic Activity:   minutes    ADL Training:   minutes      Neuromuscular Re-Education: 30 minutes      Manual Therapy: 15  minutes    Splintin minutes     Modalities:  0 minutes    Time in:1130 Time out: 1215    Timed Code Treatment Minutes:   45 min treatment    Total Treatment Minutes:  45 min    Electronically signed by:  Skye Garner OT, OTR/L

## 2021-12-09 ENCOUNTER — HOSPITAL ENCOUNTER (OUTPATIENT)
Dept: SPEECH THERAPY | Age: 66
Setting detail: THERAPIES SERIES
Discharge: HOME | End: 2021-12-09
Payer: MEDICARE

## 2021-12-09 ENCOUNTER — HOSPITAL ENCOUNTER (OUTPATIENT)
Dept: OCCUPATIONAL THERAPY | Age: 66
Setting detail: THERAPIES SERIES
Discharge: HOME | End: 2021-12-09
Payer: MEDICARE

## 2021-12-09 PROCEDURE — 97032 APPL MODALITY 1+ESTIM EA 15: CPT

## 2021-12-09 PROCEDURE — 97140 MANUAL THERAPY 1/> REGIONS: CPT

## 2021-12-09 PROCEDURE — 92507 TX SP LANG VOICE COMM INDIV: CPT

## 2021-12-09 PROCEDURE — 97112 NEUROMUSCULAR REEDUCATION: CPT

## 2021-12-09 NOTE — FLOWSHEET NOTE
[facility], [facility], [address]  [place], [address]   Phone: [phone]  Fax [phone]      Speech-Language Pathology  Daily Treatment Note    Date:  2021    Patient Name:  Gemma Amador    :  1955  MRN: [id number]  Restrictions/Precautions:  Limited communication ability due to significant Aphasia  Treatment Diagnosis:    Codes     Aphasia as late effect of cerebrovascular accident (CVA)    -  Primary   Insurance/Certification information: Medicare A & B; [address]   Referring Physician:  Catherine Brown. Emilio Chavez MD  Plan of care signed (Y/N):  Y  Visit# / total visits:  3/12   Pain level: no c/o pain    Progress Note: [x]  Yes  []  No      Subjective: The pt was in good spirits. Objective:   Short-term Goals:  Goal 1: NEW GOAL: The pt will say indivdual sentences, in response to therapy tasks, with 90% accuracy, mod-max cues:  - 75% (6/8), mod-max cues and extra time; targeted via picture description    Goal 2: NEW GOAL:   The pt will complete moderately confrontational and responsive naming with 90% accuracy, min-mod cues:  - Confrontational: 80% (7/10), min-mod cues  - Responsive: 80% (8/10), min-mod cues    Goal 3: NEW GOAL:The pt will demonstrate reading comprehension of single phrases/short sentences with 90% accuracy, mod cues  - 60%, mod cues; targeted by having him read a single phrase and then answer basic yes/no questions about what he read    Goal 4: NEW GOAL: The pt will verbally identify and label numbers 11-20 with the numbers being out of logical order, 90% accuracy, mod cues:  - 20% (2/10), mod cues    Goal 5: The pt will complete basic confrontational and responsive naming with 75% accuracy, mod-max cues:  -  GOAL MET    Goal 6: The pt will say indivdual phrase and short sentences, in response to therapy tasks, with 70% accuracy, mod-max cues:  - GOAL MET    Goal 7:  The pt will write single basic words with 50% accuracy, mod-max cues:  - DISCONTINUE THIS GOAL DUE TO LACK OF PROGRESS     Goal 8: NEW GOAL: The pt will say indivdual phrase and short sentences, in response to therapy tasks, with 90% accuracy, mod cues:  - GOAL MET     Goal 9: The pt will use an AAC speech generating device to answer given basic questions with 80% accuracy, mod-max cues:  - GOAL DISCONTINUED. The pt has not been using or bring his iPad and has not been wanting to use AAC communication due to improved verbal communication. Goal 10: NEW GOAL: The pt will verbally identify and label numbers 1-10 with the numbers being out of logical order, 90% accuracy, mod cues:  - GOAL MET    Goal 11: NEW GOAL: The pt will complete basic confrontational and responsive naming with 90% accuracy, min cues  - GOAL MET      Other Treatments:    Assessment:   The pt had much more difficulty labeling numbers this date. Plan:   D/C Speech Therapy services. See D/C Note. Total Treatment Time / Charges     Time in Time out Total Time / units   Cognitive Tx         Speech Tx 1047 1130 43 min / 1 unit   Dysphagia Tx          Signature:     Unknown JARED Orozco Runkelen  QR#[id number]  Speech-Language Pathologist  Phone #: [phone]  Fax #: [phone]

## 2021-12-09 NOTE — FLOWSHEET NOTE
[facility], [address]   Phone: [phone]  Fax [phone]      Outpatient Occupational Therapy     [x] Daily Treatment Note   [] Progress Note   [] Discharge Note    Date:  12/9/2021    Patient Name:  Toby Goldberg         YOB: 1955    Medical Diagnosis/ICD 10: Unspecified sequelae of unspecified cerebrovascular disease (I69.90)     Treatment Diagnosis:  Right hemiplegia     Onset Date:  November 2019     Referral Date:  11/15/2021     Referring Physician:    Quan Dolan MD                                                            Insurance information: Medicare A & B; AARP      Precautions/ Contraindications:  (pt denies changes to PMHx and intake forms since last seen by OT in July 2021)  Red Flag Symptoms: none  Safety awareness: intact  Other: aphasia    Adhesive allergy: NO  Latex Allergy:  [x]? NO      []? YES     Plan of care sent to provider:    [x]Faxed (date: 11/16/2021  ) []Co-signature    (attempts: 1[] 2 []3[])        Plan of care signed:    []Yes (date:  )           [x]No       Progress Note covers period from (if applicable):    [x]NA    []From          To           Next Progress Note due:   12/14/2021    Visit# / total visits:  7/8    Functional Outcome Measure:   11/16/2021: STREAM: 4/20    Subjective: Patient reports no change in right arm function.      Pain level: denies    Plan for Next Session:  NMES  WBing    Objective:       Assessment of UE tone/spasticity: RIGHT UE       7/27/2021  (OT d/c) 11/16/2021 11/23/2021 11/30/2021 12/9/2021   Shoulder flexors 2 1 to 1+ 1   1+ 1+    Internal rotators 2 1+  1  1  1+   External rotators 1 0  0  0 0    Elbow flexors 1 1  1 1+  1+    Elbow extensor 1 0  1  1 1    Supinators 0 0  0  0  0   Pronators 1+ 1  0 1   1   Wrist flexors 2 1  1  1+  1+   Wrist extensors 0 0  0  0  0   Digit flexors 3 1 to 1+  1  1  1+   Digit extensors 0 0  0  0  0   Comments: mild fluctuation noted in flexor tone when tested sitting v. Supine     Range of Motion     AROM             LEFT   RIGHT       11/16/2021 11/16/2021     Shoulder:   flex WNL   No active flex  Passive flexion to 85o                        ABD WNL   ~40o     Elbow:      ext/flex WNL   Demo ~15-20o active ext against gravity     Forearm:  sup/pron WNL   Demo ~15o of active supination (<neutral)     Wrist:       ext/flex WNL   No active movement noted     Digits: WNL   Minimal active flexion noted     Comments:    PROM of Right shoulder flex and abduction limited to <90o. PROM Right elbow/wrist/hand WFL with increased time d/t flexor tone . Demo good active scap squeeze. Trace shoulder shrug on right. Attempts at RUE active movement result in right scap retraction, shld extension with compensation at upper trap, elbow flexion, and pronation.      Exercises:    Exercises in bold performed in department today. Items not bolded are carried forward from prior visits for continuity of the record. Exercise/Equipment Resistance/Repetitions HEP Other comments      ADL/IADL TRAINING       ADL Pt unable to clip left finger nails    Educated on and Printed info regarding one handed nail clippers []      Bed mobility in prep for massage Problem solving positioning for pt's plan to see a LMT. Pt unable to tolerate obtain prone position in elevated table.     Demo ability to move from supine<>EOM on narrow elevated table with SBA  · Benefits from pillow under RUE when supine    Pt able to move from supine to sidelying (on left side)  · Benefits from pillow under right elbow and b/t knees when sidelying   []        []        []       NEUROMUSCULAR RE-EDU and THERAPEUTIC ACTIVITY        WBing 10 min  Seated EOM []      AAROM Seated  scap squeeze, 5x, 5 sec hold  shld shrug, 5x with visual and tactile cue    Supine  scap protractions, 3x  Elbow ext, 5x  Elbow flex, 5x  Shld flex, 3x [] Twylla Chalet on incline  · Red wedge  · Up/down 10x (manual resistance provided by when pulling back down)  · Diagonals right, 10x  · Diagonals left, 10x    Evans on flat table   · 5#  · Horizontal ab/duction 10x  · Forward/back, 10x [] Initially required assist to maintain grasp on evans  Increase in tone in digit flexors upon completion        THERAPEUTIC EXERCISE and MANUAL TECHNIQUES        PROM Supine  Shld flex (100o   soft end feel)  Shld abduction  (90o  soft end feel)  IR/ER  Elbow ext  Wrist ext  Digit ext []      Scap mobs EOM and side lying  All directions [] Indicates less tension upon completion       []       MODALITIES       NMES Wrist ext  Intensity: 10, then 14, then 21  Time: 15 min total    Required gradual increase in intensity, no movement until 21 [] Empi  Symmetrical  2.0 ramp  10 on/5 off         []       SPLINTING         []      Home Exercise Program: self ROM, scap squeezes, WBing (if able)    Treatment/Activity Tolerance:    Patients response to treatment:   [x]Patient tolerated treatment well []Patient limited by fatigue   []Patient limited by pain  []Patient limited by other medical complications   []Other:     Assessment:   Pt tolerating PROM, WBing and scap mobs. Demo decreased in tone after WBing, however cont to demo flexor synergy when attempting active movement with RUE. Tolerating NMES for wrist ext, cont with flexor tone when attempting active movement with NMES. Cont per OT poc. GOALS: Goals established 11/16/21   Patient stated goal: \"Improve right arm\"  []? Progressing: []? Met: []? Not Met: []? Adjusted     Therapist goals for Patient  Short Term Goals:  to be met in 2 weeks (by 11/30/2021)     Pt will demonstrate 15 degrees of active elbow flexion against gravity, for improved functional task performance. []? Progressing: []? Met: []? Not Met: []? Adjusted     Pt will demonstrate 10 degrees of active wrist extension for improved functional task performance.    []? Progressing: []? Met: [x]? Not Met: []? Adjusted     Long Term Goals:  to be met in 4 weeks (by 2021)     Pt will demonstrate active right shoulder flexion to 30 degrees against gravity for improved functional task performance.     []? Progressing: []? Met: []? Not Met: []? Adjusted     Pt will demonstrate 25 degrees of right active elbow flexion against gravity for improved functional task performance.   []? Progressing: []? Met: []? Not Met: []? Adjusted     Pt will demonstrate 20 degrees of right active elbow extension for improved functional task performance.    []? Progressing: []? Met: []? Not Met: []? Adjusted     Pt will demo ability to use on right hand as gross assist during bilateral BADL tasks. []? Progressing: []? Met: []? Not Met: []?  Adjusted     Prognosis: [x]Good   [x]Fair   []Poor    Patient Requires Follow-up:  [x]Yes  []No    Plan: []Plan of care initiated     [x]Continue per plan of care    [] Alter current plan (see comments)    []Hold pending MD visit []Discharge      ---  ---- --- ---- --- ---- --- ---- --- ---- --- ---- --- ---- --- ---- --- ---- --- --- ---    Therapeutic Exercise/Home Exercise Program:   0 minutes    Therapeutic Activity:   minutes    ADL Training:   minutes      Neuromuscular Re-Education: 15 minutes      Manual Therapy: 15  minutes    Splintin minutes     Modalities:  15 minutes    Time in:1130 Time out: 1215    Timed Code Treatment Minutes:   45 min treatment    Total Treatment Minutes:  45 min    Electronically signed by:  Brooks Christianson OT, OTR/L

## 2021-12-14 ENCOUNTER — HOSPITAL ENCOUNTER (OUTPATIENT)
Dept: OCCUPATIONAL THERAPY | Age: 66
Setting detail: THERAPIES SERIES
Discharge: HOME | End: 2021-12-14
Payer: MEDICARE

## 2021-12-14 PROCEDURE — 97032 APPL MODALITY 1+ESTIM EA 15: CPT

## 2021-12-14 PROCEDURE — 97140 MANUAL THERAPY 1/> REGIONS: CPT

## 2021-12-14 PROCEDURE — 97112 NEUROMUSCULAR REEDUCATION: CPT

## 2021-12-14 NOTE — FLOWSHEET NOTE
[facility], [facility], [address], [address]   Phone: [phone]  Fax [phone]      Outpatient Occupational Therapy     [x] Daily Treatment Note   [] Progress Note   [] Discharge Note    Date:  12/14/2021    Patient Name:  Navi Hutchinson         YOB: 1955    Medical Diagnosis/ICD 10: Unspecified sequelae of unspecified cerebrovascular disease (I69.90)     Treatment Diagnosis:  Right hemiplegia     Onset Date:  November 2019     Referral Date:  11/15/2021     Referring Physician:    Shaye De Leon. Eunice Eden MD                                                            Insurance information: Medicare A & B; AARP      Precautions/ Contraindications:  (pt denies changes to PMHx and intake forms since last seen by OT in July 2021)  Red Flag Symptoms: none  Safety awareness: intact  Other: aphasia    Adhesive allergy: NO  Latex Allergy:  [x]? NO      []? YES     Plan of care sent to provider:    [x]Faxed (date: 11/16/2021  ) []Co-signature    (attempts: 1[] 2 []3[])        Plan of care signed:    [x]Yes (date:11/19/2021  )           []No       Progress Note covers period from (if applicable):    []NA    [x]From     11/16/2021     To       12/14/2021    Visit# / total visits:  8/8    Functional Outcome Measure:   11/16/2021: STREAM: 4/20 (80%impaired)  12/15/2021: UE STREAM: 5/20 (75%impaired)    Subjective: Patient reports no change in right arm function.      Pain level: denies    Objective:       Assessment of UE tone/spasticity: RIGHT UE       7/27/2021  (OT d/c) 11/16/2021 11/23/2021 11/30/2021 12/9/2021 12/14/2021   Shoulder flexors 2 1 to 1+ 1   1+ 1+  1+   Internal rotators 2 1+  1  1  1+ 1+   External rotators 1 0  0  0 0  0   Elbow flexors 1 1  1 1+  1+  1+   Elbow extensor 1 0  1  1 1  1   Supinators 0 0  0  0  0 0   Pronators 1+ 1  0 1   1 1   Wrist flexors 2 1  1  1+  1+ 1+   Wrist extensors 0 0  0  0  0 0 Digit flexors 3 1 to 1+  1  1  1+ 1+   Digit extensors 0 0  0  0  0 0   Comments: mild fluctuation noted in flexor tone when tested sitting v. Supine     Range of Motion     AROM             LEFT   RIGHT Right      11/16/2021 11/16/2021 12/14/2021   Shoulder:   flex WNL   No active flex  Passive flexion to 85o No active flex  Passive flexion to 90o                      ABD WNL   ~40o 40o    Elbow:      ext/flex WNL   Demo ~15-20o active ext against gravity Demo ~15o active ext against gravity    Forearm:  sup/pron WNL   Demo ~15o of active supination (<neutral) Demo ~5-10o of active supination    Wrist:       ext/flex WNL   No active movement noted No active movement noted    Digits: WNL   Minimal active flexion noted No active movement noted    Comments:    Supine PROM of Right shoulder flex and abduction limited to 90-100o. PROM Right elbow/wrist/hand WFL with increased time d/t flexor tone . Demo active scap squeeze bilaterally. Trace shoulder shrug on right. Attempts at RUE active movement result in right scap retraction, shld extension with compensation at upper trap, elbow flexion, and pronation.      Exercises:    Exercises in bold performed in department today. Items not bolded are carried forward from prior visits for continuity of the record. Exercise/Equipment Resistance/Repetitions HEP Other comments      ADL/IADL TRAINING       ADL Pt unable to clip left finger nails    Educated on and Printed info regarding one handed nail clippers []      Bed mobility in prep for massage Problem solving positioning for pt's plan to see a LMT. Pt unable to tolerate obtain prone position in elevated table.     Demo ability to move from supine<>EOM on narrow elevated table with SBA  · Benefits from pillow under RUE when supine    Pt able to move from supine to sidelying (on left side)  · Benefits from pillow under right elbow and b/t knees when sidelying   []        []        []       NEUROMUSCULAR RE-EDU and THERAPEUTIC ACTIVITY        WBing 10 min  Seated EOM []      AAROM Seated  scap squeeze, 5x, 5 sec hold  shld shrug, 5x with visual and tactile cue    Supine  scap protractions, 3x  Elbow ext, 5x  Elbow flex, 5x  Shld flex, 3x []      Tanda Pop on incline  · Red wedge  · Up/down 10x (manual resistance provided by when pulling back down)  · Diagonals right, 10x  · Diagonals left, 10x    Evans on flat table   · 5#  · Horizontal ab/duction 10x  · Forward/back, 10x [] Initially required assist to maintain grasp on evans  Increase in tone in digit flexors upon completion        THERAPEUTIC EXERCISE and MANUAL TECHNIQUES        PROM Supine  Shld flex (100o   soft end feel)  Shld abduction  (90o  soft end feel)  IR/ER  Elbow ext  Wrist ext  Digit ext []      Scap mobs EOM and side lying  All directions [] Indicates less tension upon completion       []       MODALITIES       NMES Wrist ext  Intensity: 14, then 18, then 20  Time: 15 min total    Required gradual increase in intensity due to discomfort. NMES eliciting movement at 14 [] Empi  Symmetrical  2.0 ramp  10 on/5 off         []       SPLINTING         []      Home Exercise Program: self ROM, scap squeezes, WBing (if able)    Treatment/Activity Tolerance:    Patients response to treatment:   [x]Patient tolerated treatment well []Patient limited by fatigue   []Patient limited by pain  []Patient limited by other medical complications   []Other:     Assessment:   Pt demo limited progress toward OT goals. Pt met 1/2 STG and 0/4 LTG. Limited reduction in flexor tone noted at rest following neuro-toxin injections. Pt demo temporary decrease in flexor tone after WBing, however cont to demo flexor synergy when attempting active movement with RUE. Pt discharged from outpatient OT caseload this date due to min to no change in RUE function after 1 month of OP OT. Pt in agreement with plan for d/c from OT caseload this date.  Pt may benefit from an additional round of neuro-toxin injections at increased dose, if deemed appropriate by MD.       GOALS: Goals established 21   Patient stated goal: \"Improve right arm\"  []? Progressing: []? Met: [x]? Not Met: []? Adjusted     Therapist goals for Patient  Short Term Goals:  to be met in 2 weeks (by 2021)     Pt will demonstrate 15 degrees of active elbow flexion against gravity, for improved functional task performance. []? Progressing: [x]? Met: []? Not Met: []? Adjusted     Pt will demonstrate 10 degrees of active wrist extension for improved functional task performance. []? Progressing: []? Met: [x]? Not Met: []? Adjusted     Long Term Goals:  to be met in 4 weeks (by 2021)     Pt will demonstrate active right shoulder flexion to 30 degrees against gravity for improved functional task performance.     []? Progressing: []? Met: [x]? Not Met: []? Adjusted     Pt will demonstrate 25 degrees of right active elbow flexion against gravity for improved functional task performance.   []? Progressing: []? Met: [x]? Not Met: []? Adjusted     Pt will demonstrate 20 degrees of right active elbow extension for improved functional task performance.    []? Progressing: []? Met: [x]? Not Met: []? Adjusted     Pt will demo ability to use on right hand as gross assist during bilateral BADL tasks. []? Progressing: []? Met: [x]? Not Met: []?  Adjusted     Prognosis: [x]Good   [x]Fair   []Poor    Patient Requires Follow-up:  []Yes  [x]No    Plan: []Plan of care initiated     []Continue per plan of care    [] Alter current plan (see comments)    []Hold pending MD visit [x]Discharge      ---  ---- --- ---- --- ---- --- ---- --- ---- --- ---- --- ---- --- ---- --- ---- --- --- ---    Therapeutic Exercise/Home Exercise Program:   0 minutes    Therapeutic Activity:   minutes    ADL Training:   minutes      Neuromuscular Re-Education: 15 minutes      Manual Therapy: 15  minutes    Splintin minutes     Modalities:  15 minutes    Time in:1130 Time out: 1215    Timed Code Treatment Minutes:   45 min treatment    Total Treatment Minutes:  45 min    Electronically signed by:  Jake Centeno OT, OTR/L

## 2021-12-14 NOTE — PROGRESS NOTES
[facility], [facility],  Miriam Chaney, [address]  Phone: [phone]  Fax [phone]        Occupational Therapy Discharge  Date: 2021        Patient Name:  Snow Parikh    :  1955  MRN: [id number]  Referring Physician:   Edvin Alonso. Harpal Sawyer MD   Diagnosis: Unspecified sequelae of unspecified cerebrovascular disease                   ICD Code: I69.90  Surgical status:  Conservative  Number of Comorbidities: 3+  Duration:    Reporting Period: Beginning Date: 2021 End Date: 2021   Visits: 8   Weeks: 4    Discharge Demographics:   Specialty/SubSpecialty: Neuro: Brain: CVA   Certs/Equipment/Programs: NA    Objective:   Test used Initial score Discharge Score   Functional Testing UE STREAM 80% impaired 75% impaired        Assessment of UE tone/spasticity: RIGHT UE       2021  (OT d/c) 2021   Shoulder flexors 2 1 to 1+ 1   1+ 1+  1+   Internal rotators 2 1+  1  1  1+ 1+   External rotators 1 0  0  0 0  0   Elbow flexors 1 1  1 1+  1+  1+   Elbow extensor 1 0  1  1 1  1   Supinators 0 0  0  0  0 0   Pronators 1+ 1  0 1   1 1   Wrist flexors 2 1  1  1+  1+ 1+   Wrist extensors 0 0  0  0  0 0   Digit flexors 3 1 to 1+  1  1  1+ 1+   Digit extensors 0 0  0  0  0 0   Comments: mild fluctuation noted in flexor tone when tested sitting v.  Supine     Range of Motion     AROM             LEFT   RIGHT Right      2021   Shoulder:   flex WNL   No active flex  Passive flexion to 85o No active flex  Passive flexion to 90o                      ABD WNL   ~40o 40o    Elbow:      ext/flex WNL   Demo ~15-20o active ext against gravity Demo ~15o active ext against gravity    Forearm:  sup/pron WNL   Demo ~15o of active supination (<neutral) Demo ~5-10o of active supination    Wrist:       ext/flex WNL   No active movement noted No active movement noted    Digits: WNL   Minimal active flexion noted No active movement noted      Comments:    Supine PROM of Right shoulder flex and abduction limited to 90-100o. PROM Right elbow/wrist/hand WFL with increased time d/t flexor tone . Demo active scap squeeze bilaterally. Trace shoulder shrug on right. Attempts at RUE active movement result in right scap retraction, shld extension with compensation at upper trap, elbow flexion, and pronation.        Treatment to date:  Interventions Modalities    [x] Therapeutic Exercise  [x] NMES    [x] Therapeutic Activity  [] Taping    [x] Neuromusc. Re-ed  [] Dry Needling    [x] Manual Therapy  [] Paraffin     [x] BADL/IADL training  [] Ultrasound    [] Wheelchair mobility/training  [] Iontophoresis    [x] HEP Instruction  []     [] Splinting  []          Assessment:  Pt demo limited progress toward OT goals. Pt met 1/2 STG and 0/4 LTG. Limited reduction in flexor tone noted at rest following neuro-toxin injections. Pt demo temporary decrease in flexor tone after WBing, however cont to demo flexor synergy when attempting active movement with RUE. Pt discharged from outpatient OT caseload this date due to min to no change in RUE function after 1 month of OP OT. Pt in agreement with plan for d/c from OT caseload this date. Pt may benefit from an additional round of neuro-toxin injections at increased dose, if deemed appropriate by MD.        GOALS: Goals established 11/16/21   Patient stated goal: \"Improve right arm\"  []? Progressing: []? Met: [x]? Not Met: []? Adjusted     Therapist goals for Patient  Short Term Goals:  to be met in 2 weeks (by 11/30/2021)     Pt will demonstrate 15 degrees of active elbow flexion against gravity, for improved functional task performance. []? Progressing: [x]? Met: []? Not Met: []? Adjusted     Pt will demonstrate 10 degrees of active wrist extension for improved functional task performance. []? Progressing: []? Met: [x]?  Not

## 2023-08-09 ENCOUNTER — HOSPITAL ENCOUNTER (EMERGENCY)
Age: 68
Discharge: HOME | End: 2023-08-09
Attending: EMERGENCY MEDICINE
Payer: MEDICARE

## 2023-08-09 ENCOUNTER — APPOINTMENT (OUTPATIENT)
Dept: CT IMAGING | Age: 68
End: 2023-08-09
Payer: MEDICARE

## 2023-08-09 VITALS
OXYGEN SATURATION: 97 % | SYSTOLIC BLOOD PRESSURE: 124 MMHG | HEART RATE: 79 BPM | TEMPERATURE: 98.3 F | BODY MASS INDEX: 27.47 KG/M2 | HEIGHT: 67 IN | DIASTOLIC BLOOD PRESSURE: 76 MMHG | WEIGHT: 175 LBS | RESPIRATION RATE: 16 BRPM

## 2023-08-09 DIAGNOSIS — R10.84 GENERALIZED ABDOMINAL PAIN: Primary | ICD-10-CM

## 2023-08-09 LAB
ALBUMIN SERPL-MCNC: 4.6 G/DL (ref 3.4–5)
ALBUMIN/GLOB SERPL: 1.8 {RATIO} (ref 1.1–2.2)
ALP SERPL-CCNC: 114 U/L (ref 40–129)
ALT SERPL-CCNC: 18 U/L (ref 10–40)
ANION GAP SERPL CALCULATED.3IONS-SCNC: 10 MMOL/L (ref 3–16)
AST SERPL-CCNC: 16 U/L (ref 15–37)
BASOPHILS # BLD: 0 K/UL (ref 0–0.2)
BASOPHILS NFR BLD: 0.6 %
BILIRUB SERPL-MCNC: 0.7 MG/DL (ref 0–1)
BILIRUB UR QL STRIP.AUTO: NEGATIVE
BUN SERPL-MCNC: 9 MG/DL (ref 7–20)
CALCIUM SERPL-MCNC: 9.6 MG/DL (ref 8.3–10.6)
CHLORIDE SERPL-SCNC: 108 MMOL/L (ref 99–110)
CLARITY UR: CLEAR
CO2 SERPL-SCNC: 27 MMOL/L (ref 21–32)
COLOR UR: NORMAL
CREAT SERPL-MCNC: 0.9 MG/DL (ref 0.8–1.3)
DEPRECATED RDW RBC AUTO: 13.9 % (ref 12.4–15.4)
EKG ATRIAL RATE: 100 BPM
EKG ATRIAL RATE: 78 BPM
EKG DIAGNOSIS: NORMAL
EKG DIAGNOSIS: NORMAL
EKG P AXIS: 30 DEGREES
EKG P AXIS: 62 DEGREES
EKG P-R INTERVAL: 204 MS
EKG P-R INTERVAL: 218 MS
EKG Q-T INTERVAL: 368 MS
EKG Q-T INTERVAL: 416 MS
EKG QRS DURATION: 104 MS
EKG QRS DURATION: 104 MS
EKG QTC CALCULATION (BAZETT): 474 MS
EKG QTC CALCULATION (BAZETT): 474 MS
EKG R AXIS: -35 DEGREES
EKG R AXIS: -40 DEGREES
EKG T AXIS: -5 DEGREES
EKG T AXIS: -7 DEGREES
EKG VENTRICULAR RATE: 100 BPM
EKG VENTRICULAR RATE: 78 BPM
EOSINOPHIL # BLD: 0.2 K/UL (ref 0–0.6)
EOSINOPHIL NFR BLD: 2.7 %
GFR SERPLBLD CREATININE-BSD FMLA CKD-EPI: >60 ML/MIN/{1.73_M2}
GLUCOSE BLD-MCNC: 103 MG/DL (ref 70–99)
GLUCOSE SERPL-MCNC: 70 MG/DL (ref 70–99)
GLUCOSE UR STRIP.AUTO-MCNC: NEGATIVE MG/DL
HCT VFR BLD AUTO: 46 % (ref 40.5–52.5)
HGB BLD-MCNC: 16.4 G/DL (ref 13.5–17.5)
HGB UR QL STRIP.AUTO: NEGATIVE
KETONES UR STRIP.AUTO-MCNC: NEGATIVE MG/DL
LACTATE BLDV-SCNC: 1.5 MMOL/L (ref 0.4–1.9)
LEUKOCYTE ESTERASE UR QL STRIP.AUTO: NEGATIVE
LIPASE SERPL-CCNC: 39 U/L (ref 13–60)
LYMPHOCYTES # BLD: 1.2 K/UL (ref 1–5.1)
LYMPHOCYTES NFR BLD: 18.1 %
MCH RBC QN AUTO: 29 PG (ref 26–34)
MCHC RBC AUTO-ENTMCNC: 35.6 G/DL (ref 31–36)
MCV RBC AUTO: 81.5 FL (ref 80–100)
MONOCYTES # BLD: 0.7 K/UL (ref 0–1.3)
MONOCYTES NFR BLD: 10.3 %
NEUTROPHILS # BLD: 4.4 K/UL (ref 1.7–7.7)
NEUTROPHILS NFR BLD: 68.3 %
NITRITE UR QL STRIP.AUTO: NEGATIVE
PERFORMED ON: ABNORMAL
PH UR STRIP.AUTO: 7 [PH] (ref 5–8)
PLATELET # BLD AUTO: 211 K/UL (ref 135–450)
PMV BLD AUTO: 8.1 FL (ref 5–10.5)
POTASSIUM SERPL-SCNC: 3.9 MMOL/L (ref 3.5–5.1)
PROT SERPL-MCNC: 7.1 G/DL (ref 6.4–8.2)
PROT UR STRIP.AUTO-MCNC: NEGATIVE MG/DL
RBC # BLD AUTO: 5.65 M/UL (ref 4.2–5.9)
SODIUM SERPL-SCNC: 145 MMOL/L (ref 136–145)
SP GR UR STRIP.AUTO: <=1.005 (ref 1–1.03)
UA DIPSTICK W REFLEX MICRO PNL UR: NORMAL
URN SPEC COLLECT METH UR: NORMAL
UROBILINOGEN UR STRIP-ACNC: 0.2 E.U./DL
WBC # BLD AUTO: 6.5 K/UL (ref 4–11)

## 2023-08-09 PROCEDURE — 80053 COMPREHEN METABOLIC PANEL: CPT

## 2023-08-09 PROCEDURE — 93010 ELECTROCARDIOGRAM REPORT: CPT | Performed by: INTERNAL MEDICINE

## 2023-08-09 PROCEDURE — 99285 EMERGENCY DEPT VISIT HI MDM: CPT

## 2023-08-09 PROCEDURE — 83605 ASSAY OF LACTIC ACID: CPT

## 2023-08-09 PROCEDURE — 85025 COMPLETE CBC W/AUTO DIFF WBC: CPT

## 2023-08-09 PROCEDURE — 6360000004 HC RX CONTRAST MEDICATION: Performed by: NURSE PRACTITIONER

## 2023-08-09 PROCEDURE — 81003 URINALYSIS AUTO W/O SCOPE: CPT

## 2023-08-09 PROCEDURE — 93005 ELECTROCARDIOGRAM TRACING: CPT | Performed by: NURSE PRACTITIONER

## 2023-08-09 PROCEDURE — 83690 ASSAY OF LIPASE: CPT

## 2023-08-09 PROCEDURE — 74177 CT ABD & PELVIS W/CONTRAST: CPT

## 2023-08-09 RX ADMIN — IOPAMIDOL 75 ML: 755 INJECTION, SOLUTION INTRAVENOUS at 15:41

## 2023-08-09 ASSESSMENT — PAIN DESCRIPTION - LOCATION: LOCATION: ABDOMEN

## 2023-08-09 ASSESSMENT — PAIN SCALES - GENERAL: PAINLEVEL_OUTOF10: 0

## 2023-08-09 ASSESSMENT — PAIN - FUNCTIONAL ASSESSMENT: PAIN_FUNCTIONAL_ASSESSMENT: 0-10

## 2023-08-09 NOTE — ED NOTES
Pt ambulatory to restroom without the assistance of this RN, Pt gait was steady with the use of a cane. Pt back in bed resting comfortably with call light in reach.       Mica Augustine RN  08/09/23 [id number]

## 2023-08-09 NOTE — ED NOTES
Discharge instructions explained by ED provider. Patient verbalized understanding and denies any other concerns or complaints at this time. Patient vital signs stable and no acute signs or symptoms of distress noted at discharge. Patient deemed clinically stable. Patient d/c home.        Serena Carroll RN  08/09/23 [id number]

## 2023-08-09 NOTE — ED NOTES
Pt's family came out of room requesting RN - when RN arrived to room pt was diaphoretic, looked pale and was stating \"I feel weird\" . Ernesto Luque NP called to bedside.       Queen Dinorah RN  08/09/23 [id number]

## 2023-08-09 NOTE — ED PROVIDER NOTES
Emergency Department Attending Provider Note  Location: [facility]  ED  8/9/2023   Note Started: 8:55 PM EDT 8/9/23       Patient Identification  Cindy English is a 76 y.o. male      HPI:Ruddy Waters was evaluated in the Emergency Department for abdominal pain. Patient states that he was within normal limits last night and woke up this morning with abdominal pain. He denies fevers, chills, or sweats. No cough, congestion. No noted constipation, diarrhea, or urinary complaints. Symptoms began shortly after breakfast.. Although initial history and physical exam information was obtained by ARIEL/NPP/MD/ (who also dictated a record of this visit), I personally saw the patient and performed a substantive portion of the visit including all aspects of the medical decision making. PHYSICAL EXAM:  Head: Normal cephalic/atraumatic. Heart: Regular rate and rhythm. Normal S1-S2. Lungs: Clear to auscultation bilaterally. No wheezes, rales, rhonchi. Abdomen: Soft. Nontender. Nondistended. No rebound, guarding. Neurologic: Chronic right-sided deficits. EKG Interpretation    EKG: Normal sinus rhythm with a rate of 100. Left axis deviation. QTc prolonged at 474. Otherwise normal intervals and durations. Inferior Q waves noted as well as nonspecific T wave changes when compared to previous EKG on 7/8/2021. Patient seen and evaluated. Relevant records reviewed. MDM    Patient presents to the emergency department complaining of abdominal pain. Patient has stable vitals with normal workup including labs as well as CT of the abdomen. At time of anticipated discharge, patient had lightheadedness/near syncope and upon reevaluation was in fact mildly orthostatic. Patient was offered IV saline bolus but declined and instead opted for 1 L of water which he tolerated well. Patient otherwise ambulatory without difficulty. CLINICAL IMPRESSION  1.  Generalized abdominal pain          I,

## 2024-11-07 ENCOUNTER — APPOINTMENT (OUTPATIENT)
Dept: GENERAL RADIOLOGY | Age: 69
End: 2024-11-07
Payer: MEDICARE

## 2024-11-07 ENCOUNTER — APPOINTMENT (OUTPATIENT)
Dept: VASCULAR LAB | Age: 69
End: 2024-11-07
Payer: MEDICARE

## 2024-11-07 ENCOUNTER — HOSPITAL ENCOUNTER (EMERGENCY)
Age: 69
Discharge: HOME | End: 2024-11-07
Payer: MEDICARE

## 2024-11-07 VITALS
OXYGEN SATURATION: 96 % | DIASTOLIC BLOOD PRESSURE: 60 MMHG | HEART RATE: 65 BPM | TEMPERATURE: 97.8 F | SYSTOLIC BLOOD PRESSURE: 113 MMHG | RESPIRATION RATE: 16 BRPM

## 2024-11-07 DIAGNOSIS — M62.831 MUSCLE SPASM OF CALF: Primary | ICD-10-CM

## 2024-11-07 LAB
ANION GAP SERPL CALCULATED.3IONS-SCNC: 12 MMOL/L (ref 3–16)
BASOPHILS # BLD: 0.1 K/UL (ref 0–0.2)
BASOPHILS NFR BLD: 1.3 %
BUN SERPL-MCNC: 15 MG/DL (ref 7–20)
CALCIUM SERPL-MCNC: 9 MG/DL (ref 8.3–10.6)
CHLORIDE SERPL-SCNC: 109 MMOL/L (ref 99–110)
CO2 SERPL-SCNC: 24 MMOL/L (ref 21–32)
CREAT SERPL-MCNC: 0.9 MG/DL (ref 0.8–1.3)
DEPRECATED RDW RBC AUTO: 14.1 % (ref 12.4–15.4)
EOSINOPHIL # BLD: 0.2 K/UL (ref 0–0.6)
EOSINOPHIL NFR BLD: 2.5 %
GFR SERPLBLD CREATININE-BSD FMLA CKD-EPI: >90 ML/MIN/{1.73_M2}
GLUCOSE SERPL-MCNC: 90 MG/DL (ref 70–99)
HCT VFR BLD AUTO: 42.6 % (ref 40.5–52.5)
HGB BLD-MCNC: 14.8 G/DL (ref 13.5–17.5)
LYMPHOCYTES # BLD: 1.4 K/UL (ref 1–5.1)
LYMPHOCYTES NFR BLD: 21 %
MCH RBC QN AUTO: 29.2 PG (ref 26–34)
MCHC RBC AUTO-ENTMCNC: 34.7 G/DL (ref 31–36)
MCV RBC AUTO: 84.3 FL (ref 80–100)
MONOCYTES # BLD: 0.6 K/UL (ref 0–1.3)
MONOCYTES NFR BLD: 8.5 %
NEUTROPHILS # BLD: 4.4 K/UL (ref 1.7–7.7)
NEUTROPHILS NFR BLD: 66.7 %
PLATELET # BLD AUTO: 194 K/UL (ref 135–450)
PMV BLD AUTO: 8.3 FL (ref 5–10.5)
POTASSIUM SERPL-SCNC: 4.4 MMOL/L (ref 3.5–5.1)
RBC # BLD AUTO: 5.06 M/UL (ref 4.2–5.9)
SODIUM SERPL-SCNC: 145 MMOL/L (ref 136–145)
WBC # BLD AUTO: 6.6 K/UL (ref 4–11)

## 2024-11-07 PROCEDURE — 6360000002 HC RX W HCPCS

## 2024-11-07 PROCEDURE — 96374 THER/PROPH/DIAG INJ IV PUSH: CPT

## 2024-11-07 PROCEDURE — 73560 X-RAY EXAM OF KNEE 1 OR 2: CPT

## 2024-11-07 PROCEDURE — 6370000000 HC RX 637 (ALT 250 FOR IP)

## 2024-11-07 PROCEDURE — 93971 EXTREMITY STUDY: CPT

## 2024-11-07 PROCEDURE — 80048 BASIC METABOLIC PNL TOTAL CA: CPT

## 2024-11-07 PROCEDURE — 99284 EMERGENCY DEPT VISIT MOD MDM: CPT

## 2024-11-07 PROCEDURE — 85025 COMPLETE CBC W/AUTO DIFF WBC: CPT

## 2024-11-07 RX ORDER — METHOCARBAMOL 750 MG/1
1500 TABLET, FILM COATED ORAL ONCE
Status: COMPLETED | OUTPATIENT
Start: 2024-11-07 | End: 2024-11-07

## 2024-11-07 RX ORDER — OXYCODONE HYDROCHLORIDE 5 MG/1
5 TABLET ORAL EVERY 6 HOURS PRN
Qty: 12 TABLET | Refills: 0 | Status: SHIPPED | OUTPATIENT
Start: 2024-11-07 | End: 2024-11-10

## 2024-11-07 RX ORDER — FENTANYL CITRATE 50 UG/ML
25 INJECTION, SOLUTION INTRAMUSCULAR; INTRAVENOUS ONCE
Status: COMPLETED | OUTPATIENT
Start: 2024-11-07 | End: 2024-11-07

## 2024-11-07 RX ADMIN — METHOCARBAMOL TABLETS 1500 MG: 750 TABLET, COATED ORAL at 13:28

## 2024-11-07 RX ADMIN — FENTANYL CITRATE 25 MCG: 50 INJECTION INTRAMUSCULAR; INTRAVENOUS at 16:01

## 2024-11-07 ASSESSMENT — PAIN DESCRIPTION - LOCATION: LOCATION: LEG

## 2024-11-07 ASSESSMENT — LIFESTYLE VARIABLES
HOW MANY STANDARD DRINKS CONTAINING ALCOHOL DO YOU HAVE ON A TYPICAL DAY: PATIENT DOES NOT DRINK
HOW OFTEN DO YOU HAVE A DRINK CONTAINING ALCOHOL: NEVER

## 2024-11-07 ASSESSMENT — PAIN SCALES - GENERAL
PAINLEVEL_OUTOF10: 5
PAINLEVEL_OUTOF10: 7
PAINLEVEL_OUTOF10: 7

## 2024-11-07 ASSESSMENT — PAIN - FUNCTIONAL ASSESSMENT: PAIN_FUNCTIONAL_ASSESSMENT: 0-10

## 2024-11-07 ASSESSMENT — PAIN DESCRIPTION - ORIENTATION: ORIENTATION: RIGHT

## 2024-11-07 NOTE — ED PROVIDER NOTES
bromocriptine (PARLODEL) 2.5 MG tablet TAKE 2 TABLETS BY MOUTH TWICE DAILYHistorical Med      clopidogrel (PLAVIX) 75 MG tablet Take 75 mg by mouth dailyHistorical Med      FLUoxetine (PROZAC) 20 MG capsule TAKE 1 CAPSULE BY MOUTH DAILYHistorical Med      losartan (COZAAR) 25 MG tablet Take 25 mg by mouth dailyHistorical Med      pantoprazole (PROTONIX) 40 MG tablet TAKE 1 TABLET BY MOUTH DAILYHistorical Med      levETIRAcetam (KEPPRA) 500 MG tablet Take 1 tablet by mouth 2 times daily, Disp-60 tablet, R-0Print             ALLERGIES     Patient has no known allergies.    FAMILYHISTORY     No family history on file.     SOCIAL HISTORY       Social History     Tobacco Use    Smoking status: Unknown    Smokeless tobacco: Never   Vaping Use    Vaping status: Never Used   Substance Use Topics    Alcohol use: Not Currently    Drug use: Never       SCREENINGS        Elisha Coma Scale  Eye Opening: Spontaneous  Best Verbal Response: Oriented  Best Motor Response: Obeys commands  Perkins Coma Scale Score: 15                CIWA Assessment  BP: 113/60  Pulse: 65           PHYSICAL EXAM  1 or more Elements     ED Triage Vitals [11/07/24 1007]   BP Systolic BP Percentile Diastolic BP Percentile Temp Temp src Pulse Respirations SpO2   (!) 134/93 -- -- 97.8 °F (36.6 °C) -- 74 16 95 %      Height Weight         -- --             Physical Exam  Constitutional:       General: He is not in acute distress.     Appearance: Normal appearance. He is not ill-appearing.   Cardiovascular:      Rate and Rhythm: Normal rate and regular rhythm.      Pulses: Normal pulses.      Heart sounds: No murmur heard.     No gallop.   Pulmonary:      Effort: Pulmonary effort is normal. No respiratory distress.      Breath sounds: No wheezing or rales.   Musculoskeletal:         General: Tenderness present. No swelling or deformity. Normal range of motion.      Comments: Complaints of pain in the knee and in the calf however, does not have any pain

## 2024-11-07 NOTE — ED NOTES
Patient stood at bedside and took a few steps in the room with his cane. Patient could no longer ambulate due to pain of the right calf. R calf appeared to be in a flexed position. Patient back to bed and resting comfortably at this time. Provider aware.

## 2024-11-07 NOTE — CARE COORDINATION
Referred to patient for d/c planning.  Spoke to patient and brother.  Patient resides at [facility].  Brother concerned patient may need increased care.  Brother does feel patient can return to his apartment.  Brother is hoping [facility] can check on patient.  Call placed to [facility].  Spoke to nursing staff who can check on patient.  No other needs at this time.  Electronically signed by ADALBERTO Mcginnis on 11/7/2024 at 3:19 PM